# Patient Record
Sex: FEMALE | Race: WHITE | Employment: UNEMPLOYED | URBAN - METROPOLITAN AREA
[De-identification: names, ages, dates, MRNs, and addresses within clinical notes are randomized per-mention and may not be internally consistent; named-entity substitution may affect disease eponyms.]

---

## 2017-05-05 ENCOUNTER — ED HISTORICAL/CONVERTED ENCOUNTER (OUTPATIENT)
Dept: OTHER | Age: 47
End: 2017-05-05

## 2018-01-24 ENCOUNTER — TELEPHONE (OUTPATIENT)
Dept: FAMILY MEDICINE CLINIC | Age: 48
End: 2018-01-24

## 2018-01-24 NOTE — TELEPHONE ENCOUNTER
----- Message from Renea Morgan MD sent at 1/23/2018  4:32 PM EST -----  Please get records from referred office . If no records pt would need to be canceled.

## 2018-02-22 ENCOUNTER — OP HISTORICAL/CONVERTED ENCOUNTER (OUTPATIENT)
Dept: OTHER | Age: 48
End: 2018-02-22

## 2018-02-23 ENCOUNTER — OP HISTORICAL/CONVERTED ENCOUNTER (OUTPATIENT)
Dept: OTHER | Age: 48
End: 2018-02-23

## 2018-06-27 ENCOUNTER — TELEPHONE (OUTPATIENT)
Dept: FAMILY MEDICINE CLINIC | Age: 48
End: 2018-06-27

## 2018-06-27 NOTE — TELEPHONE ENCOUNTER
Pt rescheduled appointment with Dr. Sarah Mcdonough due to transportation issues but is requesting refill on Doxycycline. Pt unable to give dose and advised to have her pharmacy fax refill request to Dr. Sarah Mcdonough since we have not seen her at this office and have no record on file yet. Pt agrees to plan and is aware Dr. Sarah Mcdonough is out of office this week on vacation.   Jake

## 2018-07-03 NOTE — TELEPHONE ENCOUNTER
----- Message from Denver Begum sent at 7/3/2018 11:48 AM EDT -----  Regarding: /Refill  Pt would like a refill on \"doxycyclline\"    Pt uses [facility] pt advised contact information on file    Best contact [phone]

## 2018-08-01 ENCOUNTER — OFFICE VISIT (OUTPATIENT)
Dept: FAMILY MEDICINE CLINIC | Age: 48
End: 2018-08-01

## 2018-08-01 VITALS
RESPIRATION RATE: 12 BRPM | TEMPERATURE: 98 F | DIASTOLIC BLOOD PRESSURE: 64 MMHG | BODY MASS INDEX: 26.46 KG/M2 | HEART RATE: 76 BPM | HEIGHT: 62 IN | OXYGEN SATURATION: 97 % | SYSTOLIC BLOOD PRESSURE: 101 MMHG | WEIGHT: 143.8 LBS

## 2018-08-01 DIAGNOSIS — M46.40 DISCITIS, UNSPECIFIED SPINAL REGION: Primary | ICD-10-CM

## 2018-08-01 RX ORDER — BACLOFEN 10 MG/1
TABLET ORAL
Refills: 0 | COMMUNITY
Start: 2018-07-12

## 2018-08-01 RX ORDER — FERROUS GLUCONATE 324(37.5)
TABLET ORAL
Refills: 0 | COMMUNITY
Start: 2018-06-27

## 2018-08-01 RX ORDER — LORAZEPAM 0.5 MG/1
TABLET ORAL
Refills: 1 | COMMUNITY
Start: 2018-07-23

## 2018-08-01 RX ORDER — DOXYCYCLINE 100 MG/1
CAPSULE ORAL
Refills: 3 | COMMUNITY
Start: 2018-07-03 | End: 2018-10-15 | Stop reason: SDUPTHER

## 2018-08-01 RX ORDER — FLUCONAZOLE 150 MG/1
TABLET ORAL
Refills: 0 | COMMUNITY
Start: 2018-05-25

## 2018-08-01 RX ORDER — LEVOTHYROXINE SODIUM 50 UG/1
TABLET ORAL
Refills: 0 | COMMUNITY
Start: 2018-06-27

## 2018-08-01 RX ORDER — ALBUTEROL SULFATE 90 UG/1
AEROSOL, METERED RESPIRATORY (INHALATION)
Refills: 0 | COMMUNITY
Start: 2018-07-23

## 2018-08-01 RX ORDER — FLUTICASONE PROPIONATE 50 MCG
SPRAY, SUSPENSION (ML) NASAL
Refills: 0 | COMMUNITY
Start: 2018-05-08

## 2018-08-01 RX ORDER — RILUZOLE 50 MG/1
TABLET, FILM COATED ORAL
Refills: 0 | COMMUNITY
Start: 2018-07-10

## 2018-08-01 RX ORDER — OXYCODONE HYDROCHLORIDE 60 MG/1
TABLET, FILM COATED, EXTENDED RELEASE ORAL
Refills: 0 | COMMUNITY
Start: 2018-07-25

## 2018-08-01 RX ORDER — GLUCOSAMINE SULFATE 1500 MG
POWDER IN PACKET (EA) ORAL
Refills: 0 | COMMUNITY
Start: 2018-07-21

## 2018-08-01 RX ORDER — LAMOTRIGINE 25 MG/1
TABLET ORAL
Refills: 1 | COMMUNITY
Start: 2018-07-20

## 2018-08-01 RX ORDER — OXYCODONE HYDROCHLORIDE 30 MG/1
TABLET ORAL
Refills: 0 | COMMUNITY
Start: 2018-07-25

## 2018-08-01 RX ORDER — CITALOPRAM 40 MG/1
TABLET, FILM COATED ORAL
Refills: 1 | COMMUNITY
Start: 2018-07-13

## 2018-08-01 NOTE — MR AVS SNAPSHOT
[address] 
[phone] Patient: [facility] MRN: [id number] :1970 Visit Information Date & Time Provider Department Dept. Phone Encounter #  
 2018  2:45 PM Юлия Langston, [address] [id number] Upcoming Health Maintenance Date Due DTaP/Tdap/Td series (1 - Tdap) 10/3/1989 PAP AKA CERVICAL CYTOLOGY 10/3/1989 Influenza Age 5 to Adult 2018 Allergies as of 2018  Review Complete On: 2018 By: Shyann Paez Not on File Current Immunizations  Never Reviewed No immunizations on file. Not reviewed this visit Vitals BP Pulse Temp Resp Height(growth percentile) Weight(growth percentile) 101/64 (BP 1 Location: Left arm, BP Patient Position: Sitting) 76 98 °F (36.7 °C) (Oral) 12 5' 2\" (1.575 m) 143 lb 12.8 oz (65.2 kg) SpO2 BMI 97% 26.3 kg/m2 BMI and BSA Data Body Mass Index Body Surface Area  
 26.3 kg/m 2 1.69 m 2 Preferred Pharmacy Pharmacy Name Phone 140[facility], [address] [place] [address] [address] [place] [phone] Your Updated Medication List  
  
   
This list is accurate as of 18  3:22 PM.  Always use your most recent med list.  
  
  
  
  
 baclofen 10 mg tablet Commonly known as:  LIORESAL  
take 2 tablets BY MOUTH TWICE DAILY  
  
 cholecalciferol 1,000 unit Cap Commonly known as:  VITAMIN D3  
TAKE ONE CAPSULE BY MOUTH EVERY DAY  
  
 citalopram 40 mg tablet Commonly known as:  CELEXA  
TAKE ONE TABLET BY MOUTH EVERY MORNING  
  
 doxycycline 100 mg capsule Commonly known as:  VIBRAMYCIN  
TAKE ONE CAPSULE BY MOUTH TWICE DAILY - FOLLOWING UP FOR refills  
  
 ferrous gluconate 324 mg (37.5 mg iron) tablet TAKE ONE TABLET BY MOUTH THREE TIMES DAILY  
  
 fluconazole 150 mg tablet Commonly known as:  DIFLUCAN  
take 1 tablet by mouth as a single dose fluticasone 50 mcg/actuation nasal spray Commonly known as:  FLONASE  
use 2 sprays in each nostril EVERY TWELVE HOURS  
  
 lamoTRIgine 25 mg tablet Commonly known as: LaMICtal  
TAKE ONE TABLET BY MOUTH EVERY MORNING AND NOON AND TAKE 3 TABLETS AT 8pm  
  
 levothyroxine 50 mcg tablet Commonly known as:  SYNTHROID  
TAKE ONE TABLET BY MOUTH EVERY DAY  
  
 LORazepam 0.5 mg tablet Commonly known as:  ATIVAN  
take 1/2  to 1 tablet by mouth EVERY TWELVE HOURS AS NEEDED FOR ANXIETY (30 day supply) * OxyCONTIN 60 mg Tr12 Generic drug:  oxyCODONE  
TAKE ONE TABLET BY MOUTH every 12 hours * oxyCODONE IR 30 mg immediate release tablet Commonly known as:  ROXICODONE  
TAKE ONE TABLET BY MOUTH EVERY 4 TO 6 HOURS  
  
 riluzole tablet Commonly known as:  RILUTEK  
TAKE ONE TABLET BY MOUTH TWICE DAILY VENTOLIN HFA 90 mcg/actuation inhaler Generic drug:  albuterol INHALE TWO PUFFS BY MOUTH FOUR TIMES DAILY * Notice: This list has 2 medication(s) that are the same as other medications prescribed for you. Read the directions carefully, and ask your doctor or other care provider to review them with you. We Performed the Following C REACTIVE PROTEIN, QT [97029 CPT(R)] CBC WITH AUTOMATED DIFF [73895 CPT(R)] METABOLIC PANEL, COMPREHENSIVE [71187 CPT(R)] Introducing [facility] SERVICES! Dear Mamie Del Rio: 
Thank you for requesting a [employer] account. Our records indicate that you already have an active [employer] account. You can access your account anytime at [email or web address] Did you know that you can access your hospital and ER discharge instructions at any time in [employer]? You can also review all of your test results from your hospital stay or ER visit. Additional Information If you have questions, please visit the Frequently Asked Questions section of the [employer] website at [email or web address] Remember, [employer] is NOT to be used for urgent needs. For medical emergencies, dial 911. Now available from your iPhone and Android! Please provide this summary of care documentation to your next provider. If you have any questions after today's visit, please call [phone].

## 2018-08-01 NOTE — PROGRESS NOTES
1. Have you been to the ER, urgent care clinic since your last visit? Hospitalized since your last visit? No    2. Have you seen or consulted any other health care providers outside of the [facility] since your last visit? Include any pap smears or colon screening.  No     Chief Complaint   Patient presents with    Medication Evaluation

## 2018-08-02 NOTE — PROGRESS NOTES
[facility] Infectious Disease Specialists Progress Note           Griffin Kwon DO    [phone] Office  [phone]  Fax    8/1/2018      Assessment & Plan:   1. Postoperative spine infection. Will request records. Continue suppression with doxycycline. Follow labs every 8 weeks          Subjective:     Back pain persists    Objective:     Vitals:   Visit Vitals    /64 (BP 1 Location: Left arm, BP Patient Position: Sitting)    Pulse 76    Temp 98 °F (36.7 °C) (Oral)    Resp 12    Ht 5' 2\" (1.575 m)    Wt 65.2 kg (143 lb 12.8 oz)    SpO2 97%    BMI 26.3 kg/m2       Exam:       Physical Examination:   General:  Alert, cooperative, no distress   Head:  Normocephalic, atraumatic. Eyes:  Conjunctivae clear   Neck: Supple       Lungs:   No distress. Chest wall:     Heart:     Abdomen:      Extremities: Moves all. Skin:  No rash   Neurologic: CNII-XII intact. Labs:        No lab exists for component: ITNL   No results for input(s): CPK, CKMB, TROIQ in the last 72 hours. No results for input(s): NA, K, CL, CO2, BUN, CREA, GLU, PHOS, MG, ALB, WBC, HGB, HCT, PLT, HGBEXT, HCTEXT, PLTEXT in the last 72 hours. No lab exists for component:  CA  No results for input(s): INR, PTP, APTT in the last 72 hours.     No lab exists for component: INREXT  Needs: urine analysis, urine sodium, protein and creatinine  No results found for: BONNIE, CREAU      Cultures:     No results found for: SDES  No results found for: CULT    Radiology:     Medications       Current Outpatient Prescriptions   Medication Sig Dispense    riluzole (RILUTEK) tablet TAKE ONE TABLET BY MOUTH TWICE DAILY     OXYCONTIN 60 mg TR12 TAKE ONE TABLET BY MOUTH every 12 hours     oxyCODONE IR (ROXICODONE) 30 mg immediate release tablet TAKE ONE TABLET BY MOUTH EVERY 4 TO 6 HOURS     levothyroxine (SYNTHROID) 50 mcg tablet TAKE ONE TABLET BY MOUTH EVERY DAY     LORazepam (ATIVAN) 0.5 mg tablet take 1/2  to 1 tablet by mouth EVERY TWELVE HOURS AS NEEDED FOR ANXIETY (30 day supply)     lamoTRIgine (LAMICTAL) 25 mg tablet TAKE ONE TABLET BY MOUTH EVERY MORNING AND NOON AND TAKE 3 TABLETS AT 8pm     VENTOLIN HFA 90 mcg/actuation inhaler INHALE TWO PUFFS BY MOUTH FOUR TIMES DAILY     baclofen (LIORESAL) 10 mg tablet take 2 tablets BY MOUTH TWICE DAILY     cholecalciferol (VITAMIN D3) 1,000 unit cap TAKE ONE CAPSULE BY MOUTH EVERY DAY     citalopram (CELEXA) 40 mg tablet TAKE ONE TABLET BY MOUTH EVERY MORNING     doxycycline (VIBRAMYCIN) 100 mg capsule TAKE ONE CAPSULE BY MOUTH TWICE DAILY - FOLLOWING UP FOR refills     ferrous gluconate 324 mg (37.5 mg iron) tablet TAKE ONE TABLET BY MOUTH THREE TIMES DAILY     fluconazole (DIFLUCAN) 150 mg tablet take 1 tablet by mouth as a single dose     fluticasone (FLONASE) 50 mcg/actuation nasal spray use 2 sprays in each nostril EVERY TWELVE HOURS      No current facility-administered medications for this visit.             Case discussed with:      Brenda Hurtado DO

## 2018-10-01 LAB
ABSOLUTE BANDS, 67058: NORMAL
ABSOLUTE BLASTS: NORMAL
ABSOLUTE METAMYELOCYTES, 900360: NORMAL
ABSOLUTE MYELOCYTES: NORMAL
ABSOLUTE NRBC,ANRBC: NORMAL
ABSOLUTE PROMYELOCYTES: NORMAL
ALB/GLOBRATIO, 58C: 2.2 (CALC) (ref 1–2.5)
ALBUMIN SERPL-MCNC: 4.1 G/DL (ref 3.6–5.1)
ALP SERPL-CCNC: 125 U/L (ref 33–115)
ALT SERPL-CCNC: 16 U/L (ref 6–29)
AST SERPL W P-5'-P-CCNC: 20 U/L (ref 10–35)
BANDS,BANDS: NORMAL
BASOPHILS # BLD: 29 CELLS/UL (ref 0–200)
BASOPHILS NFR BLD: 0.4 %
BILIRUB SERPL-MCNC: 0.6 MG/DL (ref 0.2–1.2)
BLASTS,BLAST: NORMAL
BUN SERPL-MCNC: 16 MG/DL (ref 7–25)
BUN/CREATININE RATIO,BUCR: ABNORMAL (CALC) (ref 6–22)
CALCIUM SERPL-MCNC: 9.1 MG/DL (ref 8.6–10.2)
CHLORIDE SERPL-SCNC: 101 MMOL/L (ref 98–110)
CO2 SERPL-SCNC: 27 MMOL/L (ref 20–32)
COMMENT(S): NORMAL
CREAT SERPL-MCNC: 0.66 MG/DL (ref 0.5–1.1)
CRP, HIGH SENSITIVIT,SCRP: 20.3 MG/L
EOSINOPHIL # BLD: 58 CELLS/UL (ref 15–500)
EOSINOPHIL NFR BLD: 0.8 %
ERYTHROCYTE [DISTWIDTH] IN BLOOD BY AUTOMATED COUNT: 12.8 % (ref 11–15)
GLOBULIN,GLOB: 1.9 G/DL (CALC) (ref 1.9–3.7)
GLUCOSE SERPL-MCNC: 81 MG/DL (ref 65–99)
HCT VFR BLD AUTO: 42 % (ref 35–45)
HGB BLD-MCNC: 14.6 G/DL (ref 11.7–15.5)
LYMPHOCYTES # BLD: 1803 CELLS/UL (ref 850–3900)
LYMPHOCYTES NFR BLD: 24.7 %
MCH RBC QN AUTO: 31.3 PG (ref 27–33)
MCHC RBC AUTO-ENTMCNC: 34.8 G/DL (ref 32–36)
MCV RBC AUTO: 89.9 FL (ref 80–100)
METAMYELOCYTES,METAS: NORMAL
MONOCYTES # BLD: 745 CELLS/UL (ref 200–950)
MONOCYTES NFR BLD: 10.2 %
MYELOCYTES,MYELO: NORMAL
NEUTROPHILS # BLD AUTO: 4665 CELLS/UL (ref 1500–7800)
NEUTROPHILS # BLD: 63.9 %
NRBC: NORMAL
PLATELET # BLD AUTO: 212 THOUSAND/UL (ref 140–400)
PMV BLD AUTO: 10.7 FL (ref 7.5–12.5)
POTASSIUM SERPL-SCNC: 3.9 MMOL/L (ref 3.5–5.3)
PROMYELOCYTES,PRO: NORMAL
PROT SERPL-MCNC: 6 G/DL (ref 6.1–8.1)
RBC # BLD AUTO: 4.67 MILLION/UL (ref 3.8–5.1)
REACTIVE LYMPHS: NORMAL
SODIUM SERPL-SCNC: 137 MMOL/L (ref 135–146)
WBC # BLD AUTO: 7.3 THOUSAND/UL (ref 3.8–10.8)

## 2018-10-10 ENCOUNTER — OFFICE VISIT (OUTPATIENT)
Dept: FAMILY MEDICINE CLINIC | Age: 48
End: 2018-10-10

## 2018-10-10 VITALS
HEART RATE: 65 BPM | TEMPERATURE: 97.2 F | WEIGHT: 147 LBS | SYSTOLIC BLOOD PRESSURE: 134 MMHG | RESPIRATION RATE: 18 BRPM | HEIGHT: 62 IN | BODY MASS INDEX: 27.05 KG/M2 | DIASTOLIC BLOOD PRESSURE: 78 MMHG

## 2018-10-10 DIAGNOSIS — M46.40 DISCITIS, UNSPECIFIED SPINAL REGION: Primary | ICD-10-CM

## 2018-10-10 NOTE — PROGRESS NOTES
Chief Complaint   Patient presents with    Medication Evaluation     follow up on oral antibotics    Fall     10/06/2018 in bathroom.  Blackend let eye and now having blurred vision and slurred speech

## 2018-10-10 NOTE — PROGRESS NOTES
[facility] Infectious Disease Specialists Progress Note           Alfonso Camilo DO    [phone] Office  [phone]  Fax    10/10/2018      Assessment & Plan:   1. Postoperative spine infection. Will request records. Continue suppression with doxycycline. Follow labs every 8 weeks  2. Right forehead hematoma. Patient to be seen in ER today    9/28/19. CRP 20.3          Subjective:     Right forehead hematoma 2nd to fall. C/o ongoing back pain    Objective:     Vitals:   Visit Vitals    /78    Pulse 65    Temp 97.2 °F (36.2 °C) (Oral)    Resp 18    Ht 5' 2\" (1.575 m)    Wt 66.7 kg (147 lb)    BMI 26.89 kg/m2        Tmax:  @FSEB(47)@    Exam:     Physical Examination:   General:  Alert, cooperative, no distress   Head:  Left forehead hematoma   Eyes:  Conjunctivae clear   Neck: Supple       Lungs:   No distress. Chest wall:     Heart:     Abdomen:      Extremities: Moves all   Skin:  No rash   Neurologic: CNII-XII intact. Labs:        No lab exists for component: ITNL   No results for input(s): CPK, CKMB, TROIQ in the last 72 hours. No results for input(s): NA, K, CL, CO2, BUN, CREA, GLU, PHOS, MG, ALB, WBC, HGB, HCT, PLT, HGBEXT, HCTEXT, PLTEXT in the last 72 hours. No lab exists for component:  CA  No results for input(s): INR, PTP, APTT in the last 72 hours.     No lab exists for component: INREXT  Needs: urine analysis, urine sodium, protein and creatinine  No results found for: BONNIE, CREAU      Cultures:     No results found for: SDES  No results found for: CULT    Radiology:     Medications       Current Outpatient Prescriptions   Medication Sig Dispense    riluzole (RILUTEK) tablet TAKE ONE TABLET BY MOUTH TWICE DAILY     OXYCONTIN 60 mg TR12 TAKE ONE TABLET BY MOUTH every 12 hours     oxyCODONE IR (ROXICODONE) 30 mg immediate release tablet TAKE ONE TABLET BY MOUTH EVERY 4 TO 6 HOURS     levothyroxine (SYNTHROID) 50 mcg tablet TAKE ONE TABLET BY MOUTH EVERY DAY     LORazepam (ATIVAN) 0.5 mg tablet take 1/2  to 1 tablet by mouth EVERY TWELVE HOURS AS NEEDED FOR ANXIETY (30 day supply)     lamoTRIgine (LAMICTAL) 25 mg tablet TAKE ONE TABLET BY MOUTH EVERY MORNING AND NOON AND TAKE 3 TABLETS AT 8pm     VENTOLIN HFA 90 mcg/actuation inhaler INHALE TWO PUFFS BY MOUTH FOUR TIMES DAILY     baclofen (LIORESAL) 10 mg tablet take 2 tablets BY MOUTH TWICE DAILY     cholecalciferol (VITAMIN D3) 1,000 unit cap TAKE ONE CAPSULE BY MOUTH EVERY DAY     citalopram (CELEXA) 40 mg tablet TAKE ONE TABLET BY MOUTH EVERY MORNING     doxycycline (VIBRAMYCIN) 100 mg capsule TAKE ONE CAPSULE BY MOUTH TWICE DAILY - FOLLOWING UP FOR refills     ferrous gluconate 324 mg (37.5 mg iron) tablet TAKE ONE TABLET BY MOUTH THREE TIMES DAILY     fluconazole (DIFLUCAN) 150 mg tablet take 1 tablet by mouth as a single dose     fluticasone (FLONASE) 50 mcg/actuation nasal spray use 2 sprays in each nostril EVERY TWELVE HOURS      No current facility-administered medications for this visit.             Case discussed with:      Nehal Weaver DO

## 2018-10-15 NOTE — TELEPHONE ENCOUNTER
Pt called stating she's been trying to get Doxycycline refilled since last week with no response. Please send refill to [facility].  Jake

## 2018-10-16 RX ORDER — DOXYCYCLINE 100 MG/1
CAPSULE ORAL
Qty: 60 CAP | Refills: 3 | Status: SHIPPED | OUTPATIENT
Start: 2018-10-16 | End: 2019-01-21 | Stop reason: SDUPTHER

## 2019-01-21 RX ORDER — DOXYCYCLINE 100 MG/1
CAPSULE ORAL
Qty: 60 CAP | Refills: 3 | Status: SHIPPED | OUTPATIENT
Start: 2019-01-21 | End: 2019-04-29 | Stop reason: SDUPTHER

## 2019-04-29 RX ORDER — DOXYCYCLINE 100 MG/1
CAPSULE ORAL
Qty: 60 CAP | Refills: 3 | Status: SHIPPED | OUTPATIENT
Start: 2019-04-29 | End: 2019-08-05 | Stop reason: SDUPTHER

## 2019-08-05 RX ORDER — DOXYCYCLINE 100 MG/1
CAPSULE ORAL
Qty: 60 CAP | Refills: 3 | Status: SHIPPED | OUTPATIENT
Start: 2019-08-05 | End: 2019-11-11 | Stop reason: SDUPTHER

## 2019-11-12 RX ORDER — DOXYCYCLINE 100 MG/1
CAPSULE ORAL
Qty: 60 CAP | Refills: 3 | Status: SHIPPED | OUTPATIENT
Start: 2019-11-12

## 2022-08-31 ENCOUNTER — TRANSCRIBE ORDER (OUTPATIENT)
Dept: SCHEDULING | Age: 52
End: 2022-08-31

## 2023-01-18 ENCOUNTER — OFFICE VISIT (OUTPATIENT)
Dept: NEUROLOGY | Age: 53
End: 2023-01-18

## 2023-01-18 VITALS
SYSTOLIC BLOOD PRESSURE: 130 MMHG | HEIGHT: 60 IN | DIASTOLIC BLOOD PRESSURE: 70 MMHG | BODY MASS INDEX: 28.71 KG/M2 | OXYGEN SATURATION: 97 % | HEART RATE: 94 BPM

## 2023-01-18 RX ORDER — DULOXETIN HYDROCHLORIDE 60 MG/1
CAPSULE, DELAYED RELEASE ORAL
COMMUNITY
Start: 2022-12-29

## 2023-01-18 RX ORDER — ALPRAZOLAM 0.5 MG/1
TABLET ORAL
COMMUNITY
Start: 2023-01-11

## 2023-06-05 ENCOUNTER — TRANSCRIBE ORDERS (OUTPATIENT)
Facility: HOSPITAL | Age: 53
End: 2023-06-05

## 2023-06-05 DIAGNOSIS — R60.0 LEG EDEMA, LEFT: Primary | ICD-10-CM

## 2023-06-06 ENCOUNTER — HOSPITAL ENCOUNTER (OUTPATIENT)
Facility: HOSPITAL | Age: 53
Discharge: HOME | End: 2023-06-08
Payer: MEDICARE

## 2023-06-06 DIAGNOSIS — R60.0 LEG EDEMA, LEFT: ICD-10-CM

## 2023-06-06 PROCEDURE — 93971 EXTREMITY STUDY: CPT

## 2023-11-20 ENCOUNTER — HOSPITAL ENCOUNTER (OUTPATIENT)
Facility: HOSPITAL | Age: 53
Discharge: HOME | End: 2023-11-20
Attending: SPECIALIST
Payer: MEDICARE

## 2023-11-20 VITALS
DIASTOLIC BLOOD PRESSURE: 84 MMHG | RESPIRATION RATE: 18 BRPM | TEMPERATURE: 98.8 F | SYSTOLIC BLOOD PRESSURE: 136 MMHG | BODY MASS INDEX: 28.52 KG/M2 | WEIGHT: 155 LBS | HEART RATE: 85 BPM | HEIGHT: 62 IN

## 2023-11-20 DIAGNOSIS — S31.000A OPEN WOUND OF LOWER BACK: Primary | ICD-10-CM

## 2023-11-20 PROCEDURE — 87186 SC STD MICRODIL/AGAR DIL: CPT

## 2023-11-20 PROCEDURE — 87077 CULTURE AEROBIC IDENTIFY: CPT

## 2023-11-20 PROCEDURE — 87070 CULTURE OTHR SPECIMN AEROBIC: CPT

## 2023-11-20 PROCEDURE — 99204 OFFICE O/P NEW MOD 45 MIN: CPT

## 2023-11-20 PROCEDURE — 87205 SMEAR GRAM STAIN: CPT

## 2023-11-20 RX ORDER — GINSENG 100 MG
CAPSULE ORAL ONCE
OUTPATIENT
Start: 2023-11-20 | End: 2023-11-20

## 2023-11-20 RX ORDER — FAMOTIDINE 20 MG/1
60 TABLET, FILM COATED ORAL 2 TIMES DAILY
COMMUNITY

## 2023-11-20 RX ORDER — BACLOFEN 10 MG/1
50 TABLET ORAL 2 TIMES DAILY
COMMUNITY

## 2023-11-20 RX ORDER — GINSENG 100 MG
CAPSULE ORAL ONCE
Status: CANCELLED | OUTPATIENT
Start: 2023-11-20 | End: 2023-11-20

## 2023-11-20 RX ORDER — BETAMETHASONE DIPROPIONATE 0.5 MG/G
CREAM TOPICAL ONCE
Status: CANCELLED | OUTPATIENT
Start: 2023-11-20 | End: 2023-11-20

## 2023-11-20 RX ORDER — LIDOCAINE HYDROCHLORIDE 20 MG/ML
JELLY TOPICAL ONCE
Status: CANCELLED | OUTPATIENT
Start: 2023-11-20 | End: 2023-11-20

## 2023-11-20 RX ORDER — LIDOCAINE HYDROCHLORIDE 20 MG/ML
JELLY TOPICAL ONCE
OUTPATIENT
Start: 2023-11-20 | End: 2023-11-20

## 2023-11-20 RX ORDER — LIDOCAINE 40 MG/G
CREAM TOPICAL ONCE
OUTPATIENT
Start: 2023-11-20 | End: 2023-11-20

## 2023-11-20 RX ORDER — OXCARBAZEPINE 150 MG/1
100 TABLET, FILM COATED ORAL 3 TIMES DAILY
COMMUNITY

## 2023-11-20 RX ORDER — BACITRACIN ZINC AND POLYMYXIN B SULFATE 500; 1000 [USP'U]/G; [USP'U]/G
OINTMENT TOPICAL ONCE
Status: CANCELLED | OUTPATIENT
Start: 2023-11-20 | End: 2023-11-20

## 2023-11-20 RX ORDER — GENTAMICIN SULFATE 1 MG/G
OINTMENT TOPICAL ONCE
OUTPATIENT
Start: 2023-11-20 | End: 2023-11-20

## 2023-11-20 RX ORDER — LIDOCAINE 50 MG/G
OINTMENT TOPICAL ONCE
Status: CANCELLED | OUTPATIENT
Start: 2023-11-20 | End: 2023-11-20

## 2023-11-20 RX ORDER — LIDOCAINE HYDROCHLORIDE 40 MG/ML
SOLUTION TOPICAL ONCE
Status: CANCELLED | OUTPATIENT
Start: 2023-11-20 | End: 2023-11-20

## 2023-11-20 RX ORDER — LIDOCAINE 40 MG/G
CREAM TOPICAL ONCE
Status: CANCELLED | OUTPATIENT
Start: 2023-11-20 | End: 2023-11-20

## 2023-11-20 RX ORDER — CLOBETASOL PROPIONATE 0.5 MG/G
OINTMENT TOPICAL ONCE
OUTPATIENT
Start: 2023-11-20 | End: 2023-11-20

## 2023-11-20 RX ORDER — BACITRACIN ZINC AND POLYMYXIN B SULFATE 500; 1000 [USP'U]/G; [USP'U]/G
OINTMENT TOPICAL ONCE
OUTPATIENT
Start: 2023-11-20 | End: 2023-11-20

## 2023-11-20 RX ORDER — SODIUM CHLOR/HYPOCHLOROUS ACID 0.033 %
SOLUTION, IRRIGATION IRRIGATION ONCE
OUTPATIENT
Start: 2023-11-20 | End: 2023-11-20

## 2023-11-20 RX ORDER — IBUPROFEN 200 MG
TABLET ORAL ONCE
OUTPATIENT
Start: 2023-11-20 | End: 2023-11-20

## 2023-11-20 RX ORDER — LIDOCAINE HYDROCHLORIDE 40 MG/ML
SOLUTION TOPICAL ONCE
OUTPATIENT
Start: 2023-11-20 | End: 2023-11-20

## 2023-11-20 RX ORDER — SODIUM CHLOR/HYPOCHLOROUS ACID 0.033 %
SOLUTION, IRRIGATION IRRIGATION ONCE
Status: CANCELLED | OUTPATIENT
Start: 2023-11-20 | End: 2023-11-20

## 2023-11-20 RX ORDER — TRIAMCINOLONE ACETONIDE 1 MG/G
OINTMENT TOPICAL ONCE
OUTPATIENT
Start: 2023-11-20 | End: 2023-11-20

## 2023-11-20 RX ORDER — GENTAMICIN SULFATE 1 MG/G
OINTMENT TOPICAL ONCE
Status: CANCELLED | OUTPATIENT
Start: 2023-11-20 | End: 2023-11-20

## 2023-11-20 RX ORDER — LIDOCAINE 50 MG/G
OINTMENT TOPICAL ONCE
OUTPATIENT
Start: 2023-11-20 | End: 2023-11-20

## 2023-11-20 RX ORDER — TRIAMCINOLONE ACETONIDE 1 MG/G
OINTMENT TOPICAL ONCE
Status: CANCELLED | OUTPATIENT
Start: 2023-11-20 | End: 2023-11-20

## 2023-11-20 RX ORDER — IBUPROFEN 200 MG
TABLET ORAL ONCE
Status: CANCELLED | OUTPATIENT
Start: 2023-11-20 | End: 2023-11-20

## 2023-11-20 RX ORDER — CLOBETASOL PROPIONATE 0.5 MG/G
OINTMENT TOPICAL ONCE
Status: CANCELLED | OUTPATIENT
Start: 2023-11-20 | End: 2023-11-20

## 2023-11-20 RX ORDER — BETAMETHASONE DIPROPIONATE 0.5 MG/G
CREAM TOPICAL ONCE
OUTPATIENT
Start: 2023-11-20 | End: 2023-11-20

## 2023-11-20 RX ORDER — LEVOTHYROXINE SODIUM 0.1 MG/1
100 TABLET ORAL DAILY
COMMUNITY

## 2023-11-20 ASSESSMENT — PAIN SCALES - GENERAL: PAINLEVEL_OUTOF10: 7

## 2023-11-20 NOTE — DISCHARGE INSTRUCTIONS
Wound Clinic Physician Orders and Discharge Instructions  [address] [place] [address] S. 709 [facility], [address] [place] [address] Way  Telephone: [phone]    NAME:  Shalini Osorio  YOB: 1970  MEDICAL RECORD NUMBER:  [id number]  DATE:  11/20/2023      Return Appointment:  [] Dressing Supply Provider:   [x] Home Healthcare: [address] [place] [address]  [x] Return Appointment: 1 Week(s)  [] Nurse Visit:     [] Discharge from [facility]: [] Healed        [] Refer to Provider:         [] Consult    Follow-up Information:  [] Ordered Tests:   [x] Referrals: F/U with surgeon  [] Rx:   [x] Other: culture obtained in clinic      Wound Cleansing:   Do not scrub or use excessive force. Cleanse wound prior to applying a clean dressing with:  [x] Normal Saline   [] Keep Wound Dry in Shower     [] Wound Cleanser   [] Cleanse wound with Mild Soap & Water    [] Other:       Topical Treatments:  Do not apply lotions, creams, or ointments to wound bed unless directed. [] Apply moisturizing lotion to skin surrounding the wound prior to dressing change.  [] Apply antifungal ointment to skin surrounding the wound prior to dressing change.  [] Apply thin film of moisture barrier ointment to skin immediately around wound.   [] Apply Betadine to skin immediately around wound   [] Other:      Dressings:           Wound Location Back    [x] Apply Primary Dressing:       [] MediHoney Gel [] MediHoney Alginate  [] Calcium Alginate with Silver   [] Calcium Alginate without silver   [] Collagen with silver [] Collagen without Silver    [] Santyl with moist saline gauze     [x] Hydrofera Blue packing: Has tunnels at 12:00 and 6:00 (cut to size and moistened with normal saline)  [] Hydrofera Blue Ready (cut to size)      [] Normal Saline wet to dry  [] Betadine wet to dry    [] Hydrogel  [] Mepitel     [] Bactroban/Mupirocin   [] Iodoform Packing Strip [] Plain Packing Strip   [] Skin Sub:   [] Other:

## 2023-11-22 RX ORDER — CIPROFLOXACIN 500 MG/1
500 TABLET, FILM COATED ORAL 2 TIMES DAILY
Qty: 20 TABLET | Refills: 0 | OUTPATIENT
Start: 2023-11-22 | End: 2023-12-02

## 2023-11-22 NOTE — WOUND CARE
[address] Elma Nichols [address] f: [phone] f: [phone] p: [phone] [email or web address]      Ordering Center:     10 [facility]  4 [facility] [address] [address] [id number] [address] [place] [address] Dept: [address] [place] [address] [phone]    Patient Information:      Jim Streeter  [address] [place] [address]   [phone]   : 1970  AGE: 52 y.o. GENDER: female   EPISODE DATE: 2023    Insurance:      PRIMARY INSURANCE:  Plan: [employer] DUAL ADVANTAGE  Coverage: Private Outlet MEDICARE  Effective Date: 2022  Group Number: [unfilled]  Subscriber Number: [id number] - (Medicare Managed)    Payer/Plan Subscr  Sex Relation Sub. Ins. ID Effective Group Num   1. [facility] 1970 [facility] Self [id number]                                   [address]   2. [address] 1970 Female Self [id number] 3/1/20                                    [address] [address]       Patient Wound Information:      Problem List Items Addressed This Visit          Other    Open wound of lower back - Primary    Relevant Orders    Initiate Outpatient Wound Care Protocol       WOUNDS REQUIRING DRESSING SUPPLIES:     Wound 23 Back #1 (Active)   Wound Image   23 0837   Wound Etiology Non-Healing Surgical 23 0837   Dressing Status Other (Comment) 23 0837   Wound Cleansed Cleansed with saline 23 0837   Wound Length (cm) 0.7 cm 23 0837   Wound Width (cm) 0.3 cm 23 0837   Wound Depth (cm) 2.4 cm 23 0837   Wound Surface Area (cm^2) 0.21 cm^2 23 0837   Wound Volume (cm^3) 0.504 cm^3 23 0837   Distance Tunneling (cm) 2.4 cm 23 0837   Tunneling Position ___ O'Clock 6 23 0837   Wound Assessment Slough;Granulation tissue; Exposed structure bone 23 2846   Drainage Amount Moderate (25-50%)
Patient called and left a message stating she had increased drainage in her back wound. Writer spoke with Dr. Zuleyka Singh who stated patient needed to follow up with the surgeon as he is unsure if the drainage is CSF or wound drainage given the location and depth of the wound. Writer called patient back, phone went straight to voicemail, message left for patient.
Wound Clinic Physician Orders and Discharge Instructions  [address] [place] [address] S. 709 [facility], [address] [place] [address]  Telephone: [phone]    NAME:  Felice Cunningham  YOB: 1970  MEDICAL RECORD NUMBER:  [id number]  DATE:  11/20/2023      Return Appointment:  [] Dressing Supply Provider:   [x] Home Healthcare: [address] [place] [address]  [x] Return Appointment: 1 Week(s)  [] Nurse Visit:     [] Discharge from [facility]: [] Healed        [] Refer to Provider:         [] Consult    Follow-up Information:  [] Ordered Tests:   [x] Referrals: F/U with surgeon  [] Rx:   [x] Other: culture obtained in clinic      Wound Cleansing:   Do not scrub or use excessive force. Cleanse wound prior to applying a clean dressing with:  [x] Normal Saline   [] Keep Wound Dry in Shower     [] Wound Cleanser   [] Cleanse wound with Mild Soap & Water    [] Other:       Topical Treatments:  Do not apply lotions, creams, or ointments to wound bed unless directed. [] Apply moisturizing lotion to skin surrounding the wound prior to dressing change.  [] Apply antifungal ointment to skin surrounding the wound prior to dressing change.  [] Apply thin film of moisture barrier ointment to skin immediately around wound.   [] Apply Betadine to skin immediately around wound   [] Other:      Dressings:           Wound Location Back    [x] Apply Primary Dressing:       [] MediHoney Gel [] MediHoney Alginate  [] Calcium Alginate with Silver   [] Calcium Alginate without silver   [] Collagen with silver [] Collagen without Silver    [] Santyl with moist saline gauze     [x] Hydrofera Blue packing: Has tunnels at 12:00 and 6:00 (cut to size and moistened with normal saline)  [] Hydrofera Blue Ready (cut to size)      [] Normal Saline wet to dry  [] Betadine wet to dry    [] Hydrogel  [] Mepitel     [] Bactroban/Mupirocin   [] Iodoform Packing Strip [] Plain Packing Strip   [] Skin Sub:   []
without new focal deficits. Mental status normal or at baseline  Wound: In the midline lower back, there is a well-healed surgical incision which at its upper edge extends has a small open area with tunneling and undermining. Culture taken of this deeper area of the wound. PAST MEDICAL HISTORY        Diagnosis Date    Thyroid disease        PAST SURGICAL HISTORY    Past Surgical History:   Procedure Laterality Date    BACK SURGERY      CHOLECYSTECTOMY      COLON SURGERY      JOINT REPLACEMENT      REVISION TOTAL KNEE ARTHROPLASTY Right        FAMILY HISTORY    Family History   Problem Relation Age of Onset    Heart Attack Maternal Grandmother     Stroke Maternal Grandmother     Heart Attack Maternal Grandfather     Stroke Maternal Grandfather     Heart Attack Paternal Grandmother     Stroke Paternal Grandmother     Heart Attack Paternal Grandfather     Stroke Paternal Grandfather        SOCIAL HISTORY    Social History     Tobacco Use    Smoking status: Every Day     Packs/day: 0.50     Years: 40.00     Additional pack years: 0.00     Total pack years: 20.00     Types: Cigarettes    Smokeless tobacco: Never   Vaping Use    Vaping Use: Never used   Substance Use Topics    Alcohol use: Not Currently    Drug use: Never       ALLERGIES    Allergies   Allergen Reactions    Oatmeal Hives    Vancomycin Hives       MEDICATIONS    Current Outpatient Medications on File Prior to Encounter   Medication Sig Dispense Refill    OXcarbazepine (TRILEPTAL) 150 MG tablet Take 100 mg by mouth 3 times daily      baclofen (LIORESAL) 10 MG tablet Take 5 tablets by mouth 2 times daily      famotidine (PEPCID) 20 MG tablet Take 3 tablets by mouth 2 times daily      levothyroxine (SYNTHROID) 100 MCG tablet Take 1 tablet by mouth Daily       No current facility-administered medications on file prior to encounter.        REVIEW OF SYSTEMS  A comprehensive review of systems was negative except for what has been indicated above and: No

## 2023-11-23 LAB
BACTERIA SPEC CULT: ABNORMAL
BACTERIA SPEC CULT: ABNORMAL
GRAM STN SPEC: ABNORMAL
GRAM STN SPEC: ABNORMAL
Lab: ABNORMAL

## 2023-11-27 ENCOUNTER — HOSPITAL ENCOUNTER (OUTPATIENT)
Facility: HOSPITAL | Age: 53
Discharge: HOME | End: 2023-11-27
Attending: SPECIALIST
Payer: MEDICARE

## 2023-11-27 VITALS
TEMPERATURE: 98.2 F | SYSTOLIC BLOOD PRESSURE: 163 MMHG | RESPIRATION RATE: 18 BRPM | DIASTOLIC BLOOD PRESSURE: 81 MMHG | HEART RATE: 90 BPM

## 2023-11-27 DIAGNOSIS — S31.000A OPEN WOUND OF LOWER BACK: Primary | ICD-10-CM

## 2023-11-27 PROCEDURE — 11042 DBRDMT SUBQ TIS 1ST 20SQCM/<: CPT

## 2023-11-27 RX ORDER — LIDOCAINE 40 MG/G
CREAM TOPICAL ONCE
OUTPATIENT
Start: 2023-11-27 | End: 2023-11-27

## 2023-11-27 RX ORDER — LIDOCAINE HYDROCHLORIDE 20 MG/ML
JELLY TOPICAL ONCE
OUTPATIENT
Start: 2023-11-27 | End: 2023-11-27

## 2023-11-27 RX ORDER — BETAMETHASONE DIPROPIONATE 0.5 MG/G
CREAM TOPICAL ONCE
OUTPATIENT
Start: 2023-11-27 | End: 2023-11-27

## 2023-11-27 RX ORDER — SODIUM CHLOR/HYPOCHLOROUS ACID 0.033 %
SOLUTION, IRRIGATION IRRIGATION ONCE
OUTPATIENT
Start: 2023-11-27 | End: 2023-11-27

## 2023-11-27 RX ORDER — TRIAMCINOLONE ACETONIDE 1 MG/G
OINTMENT TOPICAL ONCE
OUTPATIENT
Start: 2023-11-27 | End: 2023-11-27

## 2023-11-27 RX ORDER — IBUPROFEN 200 MG
TABLET ORAL ONCE
OUTPATIENT
Start: 2023-11-27 | End: 2023-11-27

## 2023-11-27 RX ORDER — LIDOCAINE 50 MG/G
OINTMENT TOPICAL ONCE
OUTPATIENT
Start: 2023-11-27 | End: 2023-11-27

## 2023-11-27 RX ORDER — CLOBETASOL PROPIONATE 0.5 MG/G
OINTMENT TOPICAL ONCE
OUTPATIENT
Start: 2023-11-27 | End: 2023-11-27

## 2023-11-27 RX ORDER — LIDOCAINE HYDROCHLORIDE 40 MG/ML
SOLUTION TOPICAL ONCE
OUTPATIENT
Start: 2023-11-27 | End: 2023-11-27

## 2023-11-27 RX ORDER — GENTAMICIN SULFATE 1 MG/G
OINTMENT TOPICAL ONCE
OUTPATIENT
Start: 2023-11-27 | End: 2023-11-27

## 2023-11-27 RX ORDER — BACITRACIN ZINC AND POLYMYXIN B SULFATE 500; 1000 [USP'U]/G; [USP'U]/G
OINTMENT TOPICAL ONCE
OUTPATIENT
Start: 2023-11-27 | End: 2023-11-27

## 2023-11-27 RX ORDER — GINSENG 100 MG
CAPSULE ORAL ONCE
OUTPATIENT
Start: 2023-11-27 | End: 2023-11-27

## 2023-11-27 RX ORDER — CIPROFLOXACIN 500 MG/1
500 TABLET, FILM COATED ORAL 2 TIMES DAILY
Qty: 20 TABLET | Refills: 0 | Status: SHIPPED | OUTPATIENT
Start: 2023-11-27 | End: 2023-12-07

## 2023-11-27 NOTE — DISCHARGE INSTRUCTIONS
Wound Clinic Physician Orders and Discharge Instructions  [address] [place] [address] S. 709 [facility], [address] [place] [address], [address] [place] [address]  Telephone: [phone]    NAME:  Radha Olivo  YOB: 1970  MEDICAL RECORD NUMBER:  [id number]  DATE:  11/27/2023      Return Appointment:  [] Dressing Supply Provider:   [x] Home Healthcare: [address]  [x] Return Appointment: 1 Week(s)  [] Nurse Visit:     [] Discharge from [facility]: [] Healed        [] Refer to Provider:         [] Consult    Follow-up Information:  [] Ordered Tests:   [x] Referrals: F/U with surgeon  [x] Rx: Cipro to pharmacy  [] Other:       Wound Cleansing:   Do not scrub or use excessive force. Cleanse wound prior to applying a clean dressing with:  [x] Normal Saline   [] Keep Wound Dry in Shower     [] Wound Cleanser   [] Cleanse wound with Mild Soap & Water    [] Other:       Topical Treatments:  Do not apply lotions, creams, or ointments to wound bed unless directed. [] Apply moisturizing lotion to skin surrounding the wound prior to dressing change.  [] Apply antifungal ointment to skin surrounding the wound prior to dressing change.  [] Apply thin film of moisture barrier ointment to skin immediately around wound.   [] Apply Betadine to skin immediately around wound   [] Other:      Dressings:           Wound Location Back    [x] Apply Primary Dressing:       [] MediHoney Gel [] MediHoney Alginate  [] Calcium Alginate with Silver   [] Calcium Alginate without silver   [] Collagen with silver [] Collagen without Silver    [] Santyl with moist saline gauze     [x] Hydrofera Blue packing: Has tunnels at 6:00 (cut to size and moistened with normal saline)  [] Hydrofera Blue Ready (cut to size)      [] Normal Saline wet to dry  [] Betadine wet to dry    [] Hydrogel  [] Mepitel     [] Bactroban/Mupirocin   [] Iodoform Packing Strip [] Plain Packing Strip   [] Skin Sub:   [] Other:      [x] Cover and Secure

## 2023-11-27 NOTE — WOUND CARE
Wound Clinic Physician Orders and Discharge Instructions  [address] [place] [address] S. 709 [facility], [address] [place] [address] Way  Telephone: [phone]    NAME:  Radha Olivo  YOB: 1970  MEDICAL RECORD NUMBER:  [id number]  DATE:  11/27/2023      Return Appointment:  [] Dressing Supply Provider:   [x] Home Healthcare: [facility]  [x] Return Appointment: 1 Week(s)  [] Nurse Visit:     [] Discharge from [facility]: [] Healed        [] Refer to Provider:         [] Consult    Follow-up Information:  [] Ordered Tests:   [x] Referrals: F/U with surgeon  [x] Rx: Cipro to pharmacy  [] Other:       Wound Cleansing:   Do not scrub or use excessive force. Cleanse wound prior to applying a clean dressing with:  [x] Normal Saline   [] Keep Wound Dry in Shower     [] Wound Cleanser   [] Cleanse wound with Mild Soap & Water    [] Other:       Topical Treatments:  Do not apply lotions, creams, or ointments to wound bed unless directed. [] Apply moisturizing lotion to skin surrounding the wound prior to dressing change.  [] Apply antifungal ointment to skin surrounding the wound prior to dressing change.  [] Apply thin film of moisture barrier ointment to skin immediately around wound.   [] Apply Betadine to skin immediately around wound   [] Other:      Dressings:           Wound Location Back    [x] Apply Primary Dressing:       [] MediHoney Gel [] MediHoney Alginate  [] Calcium Alginate with Silver   [] Calcium Alginate without silver   [] Collagen with silver [] Collagen without Silver    [] Santyl with moist saline gauze     [x] Hydrofera Blue packing: Has tunnels at 6:00 (cut to size and moistened with normal saline)  [] Hydrofera Blue Ready (cut to size)      [] Normal Saline wet to dry  [] Betadine wet to dry    [] Hydrogel  [] Mepitel     [] Bactroban/Mupirocin   [] Iodoform Packing Strip [] Plain Packing Strip   [] Skin Sub:   [] Other:      [x] Cover and Secure
[id number]   Odor None 11/27/23 0928   Elaine-wound Assessment Intact 11/27/23 0928   Margins Unattached edges 11/27/23 0928   Wound Thickness Description not for Pressure Injury Full thickness 11/27/23 0928   Number of days: 7        Total Surface Area Debrided:  0.4 sq cm   Estimated Blood Loss: None. Hemostasis Achieved:  not needed  Procedural Pain: 0  / 10   Post Procedural Pain: 0 / 10   Response to treatment: Patient tolerated procedure well with no complaints of pain.

## 2023-12-04 ENCOUNTER — HOSPITAL ENCOUNTER (OUTPATIENT)
Facility: HOSPITAL | Age: 53
Discharge: HOME | End: 2023-12-04
Attending: SPECIALIST
Payer: MEDICARE

## 2023-12-04 VITALS
RESPIRATION RATE: 18 BRPM | SYSTOLIC BLOOD PRESSURE: 149 MMHG | HEART RATE: 71 BPM | DIASTOLIC BLOOD PRESSURE: 72 MMHG | TEMPERATURE: 97.5 F

## 2023-12-04 DIAGNOSIS — S31.000A OPEN WOUND OF LOWER BACK: Primary | ICD-10-CM

## 2023-12-04 PROCEDURE — 99213 OFFICE O/P EST LOW 20 MIN: CPT

## 2023-12-04 RX ORDER — BACITRACIN ZINC AND POLYMYXIN B SULFATE 500; 1000 [USP'U]/G; [USP'U]/G
OINTMENT TOPICAL ONCE
OUTPATIENT
Start: 2023-12-04 | End: 2023-12-04

## 2023-12-04 RX ORDER — LIDOCAINE 50 MG/G
OINTMENT TOPICAL ONCE
OUTPATIENT
Start: 2023-12-04 | End: 2023-12-04

## 2023-12-04 RX ORDER — LIDOCAINE HYDROCHLORIDE 20 MG/ML
JELLY TOPICAL ONCE
OUTPATIENT
Start: 2023-12-04 | End: 2023-12-04

## 2023-12-04 RX ORDER — LIDOCAINE 40 MG/G
CREAM TOPICAL ONCE
OUTPATIENT
Start: 2023-12-04 | End: 2023-12-04

## 2023-12-04 RX ORDER — SODIUM CHLOR/HYPOCHLOROUS ACID 0.033 %
SOLUTION, IRRIGATION IRRIGATION ONCE
OUTPATIENT
Start: 2023-12-04 | End: 2023-12-04

## 2023-12-04 RX ORDER — BETAMETHASONE DIPROPIONATE 0.5 MG/G
CREAM TOPICAL ONCE
OUTPATIENT
Start: 2023-12-04 | End: 2023-12-04

## 2023-12-04 RX ORDER — TRIAMCINOLONE ACETONIDE 1 MG/G
OINTMENT TOPICAL ONCE
OUTPATIENT
Start: 2023-12-04 | End: 2023-12-04

## 2023-12-04 RX ORDER — GINSENG 100 MG
CAPSULE ORAL ONCE
OUTPATIENT
Start: 2023-12-04 | End: 2023-12-04

## 2023-12-04 RX ORDER — CLOBETASOL PROPIONATE 0.5 MG/G
OINTMENT TOPICAL ONCE
OUTPATIENT
Start: 2023-12-04 | End: 2023-12-04

## 2023-12-04 RX ORDER — LIDOCAINE HYDROCHLORIDE 40 MG/ML
SOLUTION TOPICAL ONCE
OUTPATIENT
Start: 2023-12-04 | End: 2023-12-04

## 2023-12-04 RX ORDER — GENTAMICIN SULFATE 1 MG/G
OINTMENT TOPICAL ONCE
OUTPATIENT
Start: 2023-12-04 | End: 2023-12-04

## 2023-12-04 RX ORDER — IBUPROFEN 200 MG
TABLET ORAL ONCE
OUTPATIENT
Start: 2023-12-04 | End: 2023-12-04

## 2023-12-04 ASSESSMENT — PAIN SCALES - GENERAL: PAINLEVEL_OUTOF10: 7

## 2023-12-04 ASSESSMENT — PAIN DESCRIPTION - LOCATION: LOCATION: BACK

## 2023-12-04 NOTE — WOUND CARE
Wound Clinic Physician Orders and Discharge Instructions  [address] [place] [address] S. 709 [facility], [address] [place] [address]  Telephone: [phone]    NAME:  Diana Lozano  YOB: 1970  MEDICAL RECORD NUMBER:  [id number]  DATE:  12/4/2023      Return Appointment:  [] Dressing Supply Provider:   [x] [facility] Healthcare: [facility]  [x] Return Appointment: 1 Week(s)  [] Nurse Visit:     [] Discharge from [facility]: [] Healed        [] Refer to Provider:         [] Consult    Follow-up Information:  [] Ordered Tests:   [] Referrals:   [] Rx:   [x] Other: continue IV antibiotics      Wound Cleansing:   Do not scrub or use excessive force. Cleanse wound prior to applying a clean dressing with:  [x] Normal Saline   [] Keep Wound Dry in Shower     [] Wound Cleanser   [] Cleanse wound with Mild Soap & Water    [] Other:       Topical Treatments:  Do not apply lotions, creams, or ointments to wound bed unless directed. [] Apply moisturizing lotion to skin surrounding the wound prior to dressing change.  [] Apply antifungal ointment to skin surrounding the wound prior to dressing change.  [] Apply thin film of moisture barrier ointment to skin immediately around wound.   [] Apply Betadine to skin immediately around wound   [] Other:      Dressings:           Wound Location Back    [x] Apply Primary Dressing:       [] MediHoney Gel [] MediHoney Alginate  [] Calcium Alginate with Silver   [] Calcium Alginate without silver   [] Collagen with silver [] Collagen without Silver    [] Santyl with moist saline gauze     [x] Hydrofera Blue packing: Has tunnels at 12 & 6:00 (cut to size and moistened with normal saline)  [] Hydrofera Blue Ready (cut to size)      [] Normal Saline wet to dry  [] Betadine wet to dry    [] Hydrogel  [] Mepitel     [] Bactroban/Mupirocin   [] Iodoform Packing Strip [] Plain Packing Strip   [] Skin Sub:   [] Other:      [x] Cover and Secure with:

## 2023-12-04 NOTE — DISCHARGE INSTRUCTIONS
Wound Clinic Physician Orders and Discharge Instructions  [address] [place] [address] S. 709 [facility], [address] [place] [address] Way  Telephone: [phone]    NAME:  Cb Clarke  YOB: 1970  MEDICAL RECORD NUMBER:  [id number]  DATE:  12/4/2023      Return Appointment:  [] Dressing Supply Provider:   [x] Home Healthcare: Critical access hospital  [x] Return Appointment: 1 Week(s)  [] Nurse Visit:     [] Discharge from [facility]: [] Healed        [] Refer to Provider:         [] Consult    Follow-up Information:  [] Ordered Tests:   [] Referrals:   [] Rx:   [x] Other: continue IV antibiotics      Wound Cleansing:   Do not scrub or use excessive force. Cleanse wound prior to applying a clean dressing with:  [x] Normal Saline   [] Keep Wound Dry in Shower     [] Wound Cleanser   [] Cleanse wound with Mild Soap & Water    [] Other:       Topical Treatments:  Do not apply lotions, creams, or ointments to wound bed unless directed. [] Apply moisturizing lotion to skin surrounding the wound prior to dressing change.  [] Apply antifungal ointment to skin surrounding the wound prior to dressing change.  [] Apply thin film of moisture barrier ointment to skin immediately around wound.   [] Apply Betadine to skin immediately around wound   [] Other:      Dressings:           Wound Location Back    [x] Apply Primary Dressing:       [] MediHoney Gel [] MediHoney Alginate  [] Calcium Alginate with Silver   [] Calcium Alginate without silver   [] Collagen with silver [] Collagen without Silver    [] Santyl with moist saline gauze     [x] Hydrofera Blue packing: Has tunnels at 12 & 6:00 (cut to size and moistened with normal saline)  [] Hydrofera Blue Ready (cut to size)      [] Normal Saline wet to dry  [] Betadine wet to dry    [] Hydrogel  [] Mepitel     [] Bactroban/Mupirocin   [] Iodoform Packing Strip [] Plain Packing Strip   [] Skin Sub:   [] Other:      [x] Cover and Secure with:     [x]

## 2023-12-04 NOTE — WOUND CARE
[address] [facility]   Medical Staff Progress Note     [address] RECORD NUMBER:  [id number]  AGE: 52 y.o. GENDER: female  : 1970  EPISODE DATE:  2023    Chief complaint and reason for visit:   Surgical wound on the back  Chief Complaint   Patient presents with    Wound Check     back      Patient presenting for follow up evaluation of wound(s) per chief complaint. Subjective: Symptoms, wound related issues, or other pertinent wound history since last visit: Patient was seen by her spine surgeon last week, and she reports that she is scheduled for reoperation on her back on 2024. The surgeon also started the patient on IV antibiotics via her indwelling PICC line. She denies fever. She is having some pain on the left lateral lower leg. Wound 23 Back #1 (Active)   Wound Image   23 1037   Wound Etiology Non-Healing Surgical 23 1037   Dressing Status Old drainage noted 23 1037   Wound Cleansed Cleansed with saline 23 1037   Dressing/Treatment Other (comment) 23 1011   Wound Length (cm) 1 cm 23 1037   Wound Width (cm) 0.5 cm 23 1037   Wound Depth (cm) 3 cm 23 1037   Wound Surface Area (cm^2) 0.5 cm^2 23 1037   Change in Wound Size % (l*w) -138.1 23 1037   Wound Volume (cm^3) 1.5 cm^3 23 1037   Wound Healing % -198 23 1037   Post-Procedure Length (cm) 1 cm 23 0939   Post-Procedure Width (cm) 0.4 cm 23 0939   Post-Procedure Depth (cm) 2.2 cm 23 0939   Post-Procedure Surface Area (cm^2) 0.4 cm^2 23 0939   Post-Procedure Volume (cm^3) 0.88 cm^3 23 0939   Distance Tunneling (cm) 2.5 cm 23 1037   Tunneling Position ___ O'Clock 6 23 1037   Wound Assessment Slough;Granulation tissue; Exposed structure bone 23 1037   Drainage Amount Moderate (25-50%) 23 1037   Drainage Description

## 2023-12-11 ENCOUNTER — HOSPITAL ENCOUNTER (OUTPATIENT)
Facility: HOSPITAL | Age: 53
Discharge: HOME | End: 2023-12-11
Attending: SPECIALIST
Payer: MEDICARE

## 2023-12-11 VITALS
DIASTOLIC BLOOD PRESSURE: 75 MMHG | TEMPERATURE: 97.8 F | RESPIRATION RATE: 18 BRPM | SYSTOLIC BLOOD PRESSURE: 155 MMHG | HEART RATE: 85 BPM

## 2023-12-11 DIAGNOSIS — S31.000A OPEN WOUND OF LOWER BACK: Primary | ICD-10-CM

## 2023-12-11 PROCEDURE — 99213 OFFICE O/P EST LOW 20 MIN: CPT

## 2023-12-11 PROCEDURE — 11042 DBRDMT SUBQ TIS 1ST 20SQCM/<: CPT

## 2023-12-11 RX ORDER — LIDOCAINE HYDROCHLORIDE 20 MG/ML
JELLY TOPICAL ONCE
OUTPATIENT
Start: 2023-12-11 | End: 2023-12-11

## 2023-12-11 RX ORDER — LIDOCAINE 50 MG/G
OINTMENT TOPICAL ONCE
OUTPATIENT
Start: 2023-12-11 | End: 2023-12-11

## 2023-12-11 RX ORDER — LIDOCAINE HYDROCHLORIDE 40 MG/ML
SOLUTION TOPICAL ONCE
OUTPATIENT
Start: 2023-12-11 | End: 2023-12-11

## 2023-12-11 RX ORDER — LIDOCAINE 40 MG/G
CREAM TOPICAL ONCE
OUTPATIENT
Start: 2023-12-11 | End: 2023-12-11

## 2023-12-11 RX ORDER — GINSENG 100 MG
CAPSULE ORAL ONCE
OUTPATIENT
Start: 2023-12-11 | End: 2023-12-11

## 2023-12-11 RX ORDER — SODIUM CHLOR/HYPOCHLOROUS ACID 0.033 %
SOLUTION, IRRIGATION IRRIGATION ONCE
OUTPATIENT
Start: 2023-12-11 | End: 2023-12-11

## 2023-12-11 RX ORDER — IBUPROFEN 200 MG
TABLET ORAL ONCE
OUTPATIENT
Start: 2023-12-11 | End: 2023-12-11

## 2023-12-11 RX ORDER — BETAMETHASONE DIPROPIONATE 0.5 MG/G
CREAM TOPICAL ONCE
OUTPATIENT
Start: 2023-12-11 | End: 2023-12-11

## 2023-12-11 RX ORDER — CLOBETASOL PROPIONATE 0.5 MG/G
OINTMENT TOPICAL ONCE
OUTPATIENT
Start: 2023-12-11 | End: 2023-12-11

## 2023-12-11 RX ORDER — BACITRACIN ZINC AND POLYMYXIN B SULFATE 500; 1000 [USP'U]/G; [USP'U]/G
OINTMENT TOPICAL ONCE
OUTPATIENT
Start: 2023-12-11 | End: 2023-12-11

## 2023-12-11 RX ORDER — GENTAMICIN SULFATE 1 MG/G
OINTMENT TOPICAL ONCE
OUTPATIENT
Start: 2023-12-11 | End: 2023-12-11

## 2023-12-11 RX ORDER — TRIAMCINOLONE ACETONIDE 1 MG/G
OINTMENT TOPICAL ONCE
OUTPATIENT
Start: 2023-12-11 | End: 2023-12-11

## 2023-12-11 ASSESSMENT — PAIN SCALES - GENERAL: PAINLEVEL_OUTOF10: 5

## 2023-12-11 NOTE — WOUND CARE
Wound Clinic Physician Orders and Discharge Instructions  [address] [place] [address] S. 709 [facility], [address] [place] [address] Way  Telephone: [phone]    NAME:  Mirella John  YOB: 1970  MEDICAL RECORD NUMBER:  [id number]  DATE:  12/11/2023      Return Appointment:  [] Dressing Supply Provider:   [x] Home Healthcare: [facility]  [x] Return Appointment: 1 Week(s)  [] Nurse Visit:     [] Discharge from [facility]: [] Healed        [] Refer to Provider:         [] Consult    Follow-up Information:  [] Ordered Tests:   [] Referrals:   [] Rx:   [x] Other: continue IV antibiotics      Wound Cleansing:   Do not scrub or use excessive force. Cleanse wound prior to applying a clean dressing with:  [x] Normal Saline   [] Keep Wound Dry in Shower     [] Wound Cleanser   [] Cleanse wound with Mild Soap & Water    [] Other:       Topical Treatments:  Do not apply lotions, creams, or ointments to wound bed unless directed. [] Apply moisturizing lotion to skin surrounding the wound prior to dressing change.  [] Apply antifungal ointment to skin surrounding the wound prior to dressing change.  [] Apply thin film of moisture barrier ointment to skin immediately around wound.   [] Apply Betadine to skin immediately around wound   [] Other:      Dressings:           Wound Location Back    [x] Apply Primary Dressing:       [] MediHoney Gel [] MediHoney Alginate  [] Calcium Alginate with Silver   [] Calcium Alginate without silver   [] Collagen with silver [] Collagen without Silver    [] Santyl with moist saline gauze     [x] Hydrofera Blue packing: Has tunnels at 12 & 6:00 (cut to size and moistened with normal saline)  [] Hydrofera Blue Ready (cut to size)      [] Normal Saline wet to dry  [] Betadine wet to dry    [] Hydrogel  [] Mepitel     [] Bactroban/Mupirocin   [] Iodoform Packing Strip [] Plain Packing Strip   [] Skin Sub:   [] Other:      [x] Cover and Secure with:
Blue packing: Has tunnels at 12 & 6:00 (cut to size and moistened with normal saline)  [] Hydrofera Blue Ready (cut to size)      [] Normal Saline wet to dry  [] Betadine wet to dry    [] Hydrogel  [] Mepitel     [] Bactroban/Mupirocin   [] Iodoform Packing Strip [] Plain Packing Strip   [] Skin Sub:   [] Other:      [x] Cover and Secure with:     [x] Gauze [] Loyda [] Kerlix   [x] Zetuvit Plus Silicone Border [] Super Absorbant [] ABD     [] Ace Wrap [] Other:    Limit contact of tape with skin. [x] Change dressing: [] Daily    [] Every Other Day   [] Twice per week   [x] Three times per week   [] Once a week [] Do Not Change Dressing   [] Other:      Dietary:  [x] Diet as tolerated: [] Calorie Diabetic Diet: [] No Added Salt:  [x] Increase Protein: [] Other:     Activity:  [x] Activity as tolerated:    [] Patient has no activity restrictions      [] Strict Bedrest   [] Remain off Work      [] May return to full duty work                                     [] Return to work with restrictions     Physician:  [x] Dr. Sharmin West  [] Dr. Hay Herrera  [] Dr. David Salcedo      Nurse Case Manger:  [address] [facility] [address] Information: Should you experience any significant changes in your wound(s) or have questions about your wound care, please contact the [employer] at [phone]. Our hours are Monday - Friday 8am - 4:30pm, closed all major holidays. If you need help with your wound outside these hours and cannot wait until we are again available, contact your PCP or go to the hospital emergency room. PLEASE NOTE: IF YOU ARE UNABLE TO OBTAIN WOUND SUPPLIES, CONTINUE TO USE THE SUPPLIES YOU HAVE AVAILABLE UNTIL YOU ARE ABLE TO REACH US. IT IS MOST IMPORTANT TO KEEP THE WOUND COVERED AT ALL TIMES.             Electronically signed by Sherley Omalley MD on 12/11/2023 at 12:05 PM

## 2023-12-11 NOTE — DISCHARGE INSTRUCTIONS
Gauze [] Loyda [] Kerlix   [x] Zetuvit Plus Silicone Border [] Super Absorbant [] ABD     [] Ace Wrap [] Other:    Limit contact of tape with skin. [x] Change dressing: [] Daily    [] Every Other Day   [] Twice per week   [x] Three times per week   [] Once a week [] Do Not Change Dressing   [] Other:      Dietary:  [x] Diet as tolerated: [] Calorie Diabetic Diet: [] No Added Salt:  [x] Increase Protein: [] Other:     Activity:  [x] Activity as tolerated:    [] Patient has no activity restrictions      [] Strict Bedrest   [] Remain off Work      [] May return to full duty work                                     [] Return to work with restrictions     Physician:  [x] Dr. Purnima Rosa  [] Dr. Tc Arana  [] Dr. Kassandra Aguilar      Nurse Case Manger[facility]  [facility] Information: Should you experience any significant changes in your wound(s) or have questions about your wound care, please contact the [employer] at [phone]. Our hours are Monday - Friday 8am - 4:30pm, closed all major holidays. If you need help with your wound outside these hours and cannot wait until we are again available, contact your PCP or go to the hospital emergency room. PLEASE NOTE: IF YOU ARE UNABLE TO OBTAIN WOUND SUPPLIES, CONTINUE TO USE THE SUPPLIES YOU HAVE AVAILABLE UNTIL YOU ARE ABLE TO REACH US. IT IS MOST IMPORTANT TO KEEP THE WOUND COVERED AT ALL TIMES.

## 2024-01-23 ENCOUNTER — HOSPITAL ENCOUNTER (OUTPATIENT)
Facility: HOSPITAL | Age: 54
Discharge: HOME | End: 2024-01-23
Attending: SPECIALIST
Payer: MEDICAID

## 2024-01-23 VITALS
HEIGHT: 61 IN | DIASTOLIC BLOOD PRESSURE: 82 MMHG | BODY MASS INDEX: 29.27 KG/M2 | TEMPERATURE: 99.1 F | SYSTOLIC BLOOD PRESSURE: 158 MMHG | WEIGHT: 155 LBS | RESPIRATION RATE: 18 BRPM | HEART RATE: 99 BPM

## 2024-01-23 DIAGNOSIS — S31.000A OPEN WOUND OF LOWER BACK: Primary | ICD-10-CM

## 2024-01-23 DIAGNOSIS — L98.423 NON-PRESSURE CHRONIC ULCER OF BACK WITH NECROSIS OF MUSCLE (HCC): ICD-10-CM

## 2024-01-23 PROCEDURE — 99214 OFFICE O/P EST MOD 30 MIN: CPT

## 2024-01-23 RX ORDER — OXYCODONE HYDROCHLORIDE 5 MG/1
5 TABLET ORAL EVERY 4 HOURS PRN
COMMUNITY

## 2024-01-23 NOTE — WOUND CARE
Wound Clinic Physician Orders and Discharge Instructions  [facility]  [address], [address]  [place] [address]  Telephone: [phone]     FAX [phone]    NAME:  Paula Renteria  YOB: 1970  MEDICAL RECORD NUMBER:  [id number]  DATE:  1/23/2024      Return Appointment:  [x] Dressing Supply Provider: THONY  [x] Home Healthcare: [facility]ITIATE -  [facility]  [x] Return Appointment: 1 Week(s)  [] Nurse Visit:     [x] Discharge from [facility]: [] Healed        [] Consult    Follow-up Information:  [] Ordered Tests:   [x] Referrals: Baldo Pulido MD  - ORTHOPEDIC- PATIENT TO KEEP APPOINTMENT  [] Rx:   [] Other:       Wound Cleansing:   Do not scrub or use excessive force.  Cleanse wound prior to applying a clean dressing with:  [x] Normal Saline OR   [] Keep Wound Dry in Shower     [] Wound Cleanser   [x] Cleanse wound with Mild Soap & Water    [] Other:       Topical Treatments:  Do not apply lotions, creams, or ointments to wound bed unless directed.   [] Apply moisturizing lotion to skin surrounding the wound prior to dressing change.  [] Apply antifungal ointment to skin surrounding the wound prior to dressing change.  [] Apply thin film of moisture barrier ointment to skin immediately around wound.  [] Apply Betadine to skin immediately around wound   [] Other:      Dressings:           Wound Location Back    [x] Apply Primary Dressing:       [] MediHoney Gel [] MediHoney Alginate  [] Calcium Alginate with Silver   [] Calcium Alginate without silver   [] Collagen with silver [] Collagen without Silver    [] Santyl with moist saline gauze     [x] Hydrofera Blue packing: UNTIL WOUND VAC ARRIVES OR IF WOUND VAC MALFUNCTIONS  (cut to size and moistened with normal saline)  [] Hydrofera Blue Ready (cut to size)      [] Normal Saline wet to dry  [] Betadine wet to dry    [] Hydrogel  [] Mepitel     [] Bactroban/Mupirocin   [] Iodoform Packing Strip [] Plain Packing

## 2024-01-23 NOTE — WOUND CARE
Wound Care Supplies      Supply Company:     [employer] [address] [place] [address] f: [phone] f: [phone] p: [phone] [email or web address]      Ordering Center:     [facility]  [address] [place] [address], [address] [address]    Phone: [phone]  Fax: [phone]    Patient Information:      Patricia Renteria  [address] [facility] [address]   [phone]   : 1970  AGE: 53 y.o.     GENDER: female   EPISODE DATE: 2024    Insurance:      PRIMARY INSURANCE:  Plan: [employer] [facility] HEALTHKEEPERS PLUS  Coverage: [facility] MEDICAID  Effective Date: 3/1/2020  Group Number: [unfilled]  Subscriber Number: [id number] - (Medicaid Managed)    Payer/Plan Subscr  Sex Relation Sub. Ins. ID Effective Group Num   1. [facility] MEDIC* PATRICIA RENTERIA 1970 Female Self [id number] 3/1/20                                    [address] [address]       Patient Wound Information:      Problem List Items Addressed This Visit    None      WOUNDS REQUIRING DRESSING SUPPLIES:     Wound 24 Back Lower #1 (Active)   Wound Image   24 1019   Wound Etiology Surgical 24 1019   Dressing Status Clean;Dry;Intact 24 1019   Wound Cleansed Cleansed with saline 24 1019   Wound Length (cm) 4 cm 24 1019   Wound Width (cm) 0.8 cm 24 1019   Wound Depth (cm) 3.7 cm 24 1019   Wound Surface Area (cm^2) 3.2 cm^2 24 1019   Wound Volume (cm^3) 11.84 cm^3 24 1019   Undermining Starts ___ O'Clock 12 24 1019   Undermining Ends___ O'Clock 12 24 1019   Undermining Maxium Distance (cm) 4 24 1019   Wound Assessment Granulation tissue;Slough;Exposed structure muscle 24 1019   Drainage Amount Moderate (25-50%) 24 1019   Drainage Description Serosanguinous 24 1019   Odor Moderate 24 1019   Elaine-wound Assessment Intact 24 1019   Margins Unattached edges;Undefined edges 24 1019

## 2024-01-23 NOTE — WOUND CARE
Order for V.A.C.®  Negative Pressure Wound Therapy  Please fax this form to [facility] at [phone]  [facility] Customer Service: [phone]      Ordering Center:   [facility]  [address] [place] [id number]  [phone]  WOUND CARE Dept: [phone]   FAX NUMBER [phone]  Patient Information:   Patricia Renteria  [address] [facility] [address]   [phone]   : 1970  AGE: 53 y.o.     GENDER: female   TODAYS DATE:  2024  Insurance:   PRIMARY INSURANCE:  Plan: [employer] [facility] HEALTHKEEPERS PLUS  Coverage: [facility] MEDICAID  Effective Date: 3/1/2020  [id number] - (Medicaid Managed)    Payer/Plan Subscr  Sex Relation Sub. Ins. ID Effective Group Num   1. [facility] MEDIC* PATRICIA RENTERIA 1970 Female Self [id number] 3/1/20                                    [address] [address]       Patient Wound Information:     Duration of the V.A.C.®  Negative Pressure Wound Therapy: 4 Month which includes up to 15 dressings per wound and up to 10 canisters per month    Goal at the completion of V.A.C.®  Negative Pressure Therapy: Assist in granulation tissue formation     Will HomeCare provide V.A.C.®  Therapy?  Yes Homecare will provide VAC Therapy. The date in which Homecare will initiate VAC Therapy is 2024     Equipment for Delivery:     Delivery Location V.A.C.® Therapy Pump: Patient's Home Address located under Patient Information on the top of this form    Up to 15 dressings per wound, per month  VAC Canisters: Up to 10 canisters per month  V.A.C.® Dressing: GRANUFOAM VAC Dressing Bridge XG Dressing, WHITEFOAM VAC Dressing Small, and SIMPLACE VAC Dressing Small    Clinical Information by Wound Type:     Was NPWT initiated in an inpatient facility? No or  Has the patient been on NPWT anytime during the last 60 days? Yes [place] [facility]     Is the patient’s nutritional status compromised? No    Please thomas all dressings that

## 2024-01-23 NOTE — DISCHARGE INSTRUCTIONS
Yaklein Gordon RN  Registered Nurse     Wound Care     Signed     Date of Service: 1/23/2024 10:00 AM     Signed                          Wound Clinic Physician Orders and Discharge Instructions  [facility]  [facility] [address]  [place] [id number]  Telephone: [phone]     FAX [phone]     NAME:  Paula Renteria  YOB: 1970  MEDICAL RECORD NUMBER:  [id number]  DATE:  1/23/2024        Return Appointment:  [x] Dressing Supply Provider: THONY  [x] Home Healthcare: [facility] -  [facility]  [x] Return Appointment: 1 Week(s)  [] Nurse Visit:      [x] Discharge from [facility]: [] Healed        [] Consult     Follow-up Information:  [] Ordered Tests:   [x] Referrals: Baldo Pulido MD  - ORTHOPEDIC- PATIENT TO KEEP APPOINTMENT  [] Rx:   [] Other:         Wound Cleansing:   Do not scrub or use excessive force.  Cleanse wound prior to applying a clean dressing with:  [x] Normal Saline OR    [] Keep Wound Dry in Shower     [] Wound Cleanser   [x] Cleanse wound with Mild Soap & Water    [] Other:        Topical Treatments:  Do not apply lotions, creams, or ointments to wound bed unless directed.   [] Apply moisturizing lotion to skin surrounding the wound prior to dressing change.  [] Apply antifungal ointment to skin surrounding the wound prior to dressing change.  [] Apply thin film of moisture barrier ointment to skin immediately around wound.  [] Apply Betadine to skin immediately around wound   [] Other:                 Dressings:                  Wound Location Back    [x] Apply Primary Dressing:                                          [] MediHoney Gel      [] MediHoney Alginate  [] Calcium Alginate with Silver   [] Calcium Alginate without silver              [] Collagen with silver            [] Collagen without Silver    [] Santyl with moist saline gauze                [x] Hydrofera Blue packing: UNTIL WOUND VAC ARRIVES OR IF WOUND VAC MALFUNCTIONS  (cut to

## 2024-01-24 RX ORDER — SODIUM CHLOR/HYPOCHLOROUS ACID 0.033 %
SOLUTION, IRRIGATION IRRIGATION ONCE
OUTPATIENT
Start: 2024-01-24 | End: 2024-01-24

## 2024-01-24 RX ORDER — GENTAMICIN SULFATE 1 MG/G
OINTMENT TOPICAL ONCE
OUTPATIENT
Start: 2024-01-24 | End: 2024-01-24

## 2024-01-24 RX ORDER — CLOBETASOL PROPIONATE 0.5 MG/G
OINTMENT TOPICAL ONCE
OUTPATIENT
Start: 2024-01-24 | End: 2024-01-24

## 2024-01-24 RX ORDER — BETAMETHASONE DIPROPIONATE 0.5 MG/G
CREAM TOPICAL ONCE
OUTPATIENT
Start: 2024-01-24 | End: 2024-01-24

## 2024-01-24 RX ORDER — TRIAMCINOLONE ACETONIDE 1 MG/G
OINTMENT TOPICAL ONCE
OUTPATIENT
Start: 2024-01-24 | End: 2024-01-24

## 2024-01-24 RX ORDER — IBUPROFEN 200 MG
TABLET ORAL ONCE
OUTPATIENT
Start: 2024-01-24 | End: 2024-01-24

## 2024-01-24 RX ORDER — GINSENG 100 MG
CAPSULE ORAL ONCE
OUTPATIENT
Start: 2024-01-24 | End: 2024-01-24

## 2024-01-24 RX ORDER — BACITRACIN ZINC AND POLYMYXIN B SULFATE 500; 1000 [USP'U]/G; [USP'U]/G
OINTMENT TOPICAL ONCE
OUTPATIENT
Start: 2024-01-24 | End: 2024-01-24

## 2024-01-24 NOTE — CONSULTS
Nontender. Surrounding skin with mild discoloration and erythema.   Treatment recommendation - Debridement  of slough to improve wound care.  Discussed with patient the purpose to enhance wound healing and the potential risks of infections, bleeding and pain.  She agrees to procedure. With Curette, slough was removed uneventfully. Minimal  bleeding which was controlled with direct pressure.    Recommend patient have negative wound vac placed.  This will be arranged with order.  Meanwhile will continue with hydrofera blue packing, cover with gauze, Zetuvit plus silicone border; change 3 times a week. Return next week.  Advised patient to stop smoking explaining the delay wound healing affect    Other diagnoses or problems addressed:      Pertinent labs reviewed.   Review of medical records and external note (s) from other providers was done for this visit.    New lab or imaging orders placed:  none    Prescription drug management: N/A     Risk of complications and/or mortality of patient management:  This patient has a minimal risk of morbidity and mortality from additional diagnostic testing or treatment. This is due to the above conditions affecting wound healing as well as patient and procedure risk factors. Education and discussion held with patient regarding these disease processes pertinent to wound(s).  Other pertinent decisions include: minor surgery or procedures as below.  The patient's diagnosis or treatment is  significantly limited by social determinants of health as noted by: wound care supply limitations .    Discussion of management or test interpretation with  patient who comes alone .    Time spent with patient and patient care issues above the usual time needed for wound assessment and treatment was: [] 15-20 min  [x] 21-30 min  [] 31-44 min  [] 45 min or more  This included time retrieving and reviewing records with patient and education provided to patient regarding disease process(es),

## 2024-01-25 NOTE — WOUND CARE
Wound Care Supplies      Supply Company:     [employer] [address] [place] [address] f: [phone] f: [phone] p: [phone] [email or web address]      Ordering Center:     [facility]  [address] [place] [address], [address] [address]    Phone: [phone]  Fax: [phone]    Patient Information:      Patricia Renteria  [address] [facility] [place] [address]   [phone]   : 1970  AGE: 53 y.o.     GENDER: female   EPISODE DATE: 2024    Insurance:      PRIMARY INSURANCE:  Plan: [employer] [facility] HEALTHKEEPERS PLUS  Coverage: [facility] MEDICAID  Effective Date: 3/1/2020  Group Number: [unfilled]  Subscriber Number: [id number] - (Medicaid Managed)    Payer/Plan Subscr  Sex Relation Sub. Ins. ID Effective Group Num   1. 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)       WOUNDS REQUIRING DRESSING SUPPLIES:     Wound 24 Back Lower #1 (Active)   Wound Image   24 1019   Wound Etiology Surgical 24 1019   Dressing Status Clean;Dry;Intact 24 1019   Wound Cleansed Cleansed with saline 24 1019   Wound Length (cm) 4 cm 24 1019   Wound Width (cm) 0.8 cm 24 1019   Wound Depth (cm) 3.7 cm 24 1019   Wound Surface Area (cm^2) 3.2 cm^2 24 1019   Wound Volume (cm^3) 11.84 cm^3 24 1019   Undermining Starts ___ O'Clock 12 24 1019   Undermining Ends___ O'Clock 12 24 1019   Undermining Maxium Distance (cm) 4 24 1019   Wound Assessment Granulation tissue;Slough;Exposed structure muscle 24 1019   Drainage Amount Moderate (25-50%) 24 1019   Drainage Description Serosanguinous 24 1019   Odor

## 2024-01-30 ENCOUNTER — HOSPITAL ENCOUNTER (OUTPATIENT)
Facility: HOSPITAL | Age: 54
Discharge: HOME | End: 2024-01-30
Attending: SPECIALIST
Payer: MEDICAID

## 2024-01-30 VITALS
TEMPERATURE: 97.9 F | RESPIRATION RATE: 18 BRPM | DIASTOLIC BLOOD PRESSURE: 79 MMHG | HEART RATE: 98 BPM | SYSTOLIC BLOOD PRESSURE: 139 MMHG

## 2024-01-30 DIAGNOSIS — S31.000A OPEN WOUND OF LOWER BACK: ICD-10-CM

## 2024-01-30 DIAGNOSIS — L98.423 NON-PRESSURE CHRONIC ULCER OF BACK WITH NECROSIS OF MUSCLE (HCC): Primary | ICD-10-CM

## 2024-01-30 PROCEDURE — 11042 DBRDMT SUBQ TIS 1ST 20SQCM/<: CPT

## 2024-01-30 RX ORDER — SODIUM CHLOR/HYPOCHLOROUS ACID 0.033 %
SOLUTION, IRRIGATION IRRIGATION ONCE
OUTPATIENT
Start: 2024-01-30 | End: 2024-01-30

## 2024-01-30 RX ORDER — TRIAMCINOLONE ACETONIDE 1 MG/G
OINTMENT TOPICAL ONCE
OUTPATIENT
Start: 2024-01-30 | End: 2024-01-30

## 2024-01-30 RX ORDER — IBUPROFEN 200 MG
TABLET ORAL ONCE
OUTPATIENT
Start: 2024-01-30 | End: 2024-01-30

## 2024-01-30 RX ORDER — GINSENG 100 MG
CAPSULE ORAL ONCE
OUTPATIENT
Start: 2024-01-30 | End: 2024-01-30

## 2024-01-30 RX ORDER — BETAMETHASONE DIPROPIONATE 0.5 MG/G
CREAM TOPICAL ONCE
OUTPATIENT
Start: 2024-01-30 | End: 2024-01-30

## 2024-01-30 RX ORDER — GENTAMICIN SULFATE 1 MG/G
OINTMENT TOPICAL ONCE
OUTPATIENT
Start: 2024-01-30 | End: 2024-01-30

## 2024-01-30 RX ORDER — CLOBETASOL PROPIONATE 0.5 MG/G
OINTMENT TOPICAL ONCE
OUTPATIENT
Start: 2024-01-30 | End: 2024-01-30

## 2024-01-30 RX ORDER — BACITRACIN ZINC AND POLYMYXIN B SULFATE 500; 1000 [USP'U]/G; [USP'U]/G
OINTMENT TOPICAL ONCE
OUTPATIENT
Start: 2024-01-30 | End: 2024-01-30

## 2024-01-30 NOTE — DISCHARGE INSTRUCTIONS
Yakelin Gordon RN  Registered Nurse     Wound Care     Signed     Date of Service: 1/30/2024 10:15 AM     Signed                          Wound Clinic Physician Orders and Discharge Instructions  [facility]  [facility] [address], [address] [place], [place] [address]  Telephone: [phone]     FAX [phone]     NAME:  Paula Renteria  YOB: 1970  MEDICAL RECORD NUMBER:  [id number]  DATE:  1/30/2024        Return Appointment:  [x] Dressing Supply Provider: THONY  [x] Home Healthcare: [facility]-     [x] Return Appointment: 1 Week(s)  [] Nurse Visit:      [x] Discharge from [facility]: [] Healed        [] Consult     Follow-up Information:  [] Ordered Tests:   [x] Referrals: Baldo Pulido MD  - ORTHOPEDIC- PATIENT TO KEEP APPOINTMENT  [] Rx:   [] Other:         Wound Cleansing:   Do not scrub or use excessive force.  Cleanse wound prior to applying a clean dressing with:  [x] Normal Saline OR    [] Keep Wound Dry in Shower     [] Wound Cleanser   [x] Cleanse wound with Mild Soap & Water    [] Other:        Topical Treatments:  Do not apply lotions, creams, or ointments to wound bed unless directed.   [] Apply moisturizing lotion to skin surrounding the wound prior to dressing change.  [] Apply antifungal ointment to skin surrounding the wound prior to dressing change.  [] Apply thin film of moisture barrier ointment to skin immediately around wound.  [] Apply Betadine to skin immediately around wound   [] Other:                 Dressings:                  Wound Location Back    [x] Apply Primary Dressing:                                          [] MediHoney Gel      [] MediHoney Alginate  [] Calcium Alginate with Silver   [] Calcium Alginate without silver              [] Collagen with silver            [] Collagen without Silver    [] Santyl with moist saline gauze                [x] Hydrofera Blue packing: UNTIL WOUND VAC ARRIVES OR IF WOUND VAC MALFUNCTIONS  (cut to size and

## 2024-01-30 NOTE — WOUND CARE
Wound Clinic Physician Orders and Discharge Instructions  [facility]  [address], [address]  [place] [address]  Telephone: [phone]     FAX [phone]    NAME:  Paula Renteria  YOB: 1970  MEDICAL RECORD NUMBER:  [id number]  DATE:  1/30/2024      Return Appointment:  [x] Dressing Supply Provider: THONY  [x] Home Healthcare: [facility] -     [x] Return Appointment: 1 Week(s)  [] Nurse Visit:     [x] Discharge from [facility]: [] Healed        [] Consult    Follow-up Information:  [] Ordered Tests:   [x] Referrals: Baldo Pulido MD  - ORTHOPEDIC- PATIENT TO KEEP APPOINTMENT  [] Rx:   [] Other:       Wound Cleansing:   Do not scrub or use excessive force.  Cleanse wound prior to applying a clean dressing with:  [x] Normal Saline OR   [] Keep Wound Dry in Shower     [] Wound Cleanser   [x] Cleanse wound with Mild Soap & Water    [] Other:       Topical Treatments:  Do not apply lotions, creams, or ointments to wound bed unless directed.   [] Apply moisturizing lotion to skin surrounding the wound prior to dressing change.  [] Apply antifungal ointment to skin surrounding the wound prior to dressing change.  [] Apply thin film of moisture barrier ointment to skin immediately around wound.  [] Apply Betadine to skin immediately around wound   [] Other:      Dressings:           Wound Location Back    [x] Apply Primary Dressing:       [] MediHoney Gel [] MediHoney Alginate  [] Calcium Alginate with Silver   [] Calcium Alginate without silver   [] Collagen with silver [] Collagen without Silver    [] Santyl with moist saline gauze     [x] Hydrofera Blue packing: UNTIL WOUND VAC ARRIVES OR IF WOUND VAC MALFUNCTIONS  (cut to size and moistened with normal saline)  [] Hydrofera Blue Ready (cut to size)      [] Normal Saline wet to dry  [] Betadine wet to dry    [] Hydrogel  [] Mepitel     [] Bactroban/Mupirocin   [] Iodoform Packing Strip [] Plain Packing Strip   []

## 2024-01-31 NOTE — PROGRESS NOTES
[facility]   Wound Care and Hyperbaric Oxygen Therapy Center   Medical Staff Progress Note     Paula Renteria  MEDICAL RECORD NUMBER:  [id number]  AGE: 53 y.o.   GENDER: female  : 1970  EPISODE DATE:  2024    Chief complaint and reason for visit:     Chief Complaint   Patient presents for follow up of her post op lower spine surgical wound complication    Wound Check     back      Patient presenting for follow up evaluation of wound(s) per chief complaint.      Subjective and ROS: Paula Renteria is a 53 y.o. female who presents today for an initial evaluation of a wound/ulcer. Patient is new to me but not to the wound center . Wound duration:  September- last back surgery in  .     History of Wound Context: 54 yo female with history of multiple (14-15) back surgeries over the years, presented to Wound Care in  with post op lower spine surgical wound that persisted and failed to heal.  She has been treated with IV antibiotics.  Wound care included treatment with Hydrofera blue backing and further antibiotics.  Subsequently, the patient returned back to her surgeon for reexcision which she had on  at [facility].  The patient denies any fever or chills but states her temperature runs around 99 at home.       Patient left hospital AMA due to confusion which she attributes to the narcotics.  She has no wound vac and has only used topical dressing. Using mostly Tylenol or Advil for pain control. Saw her surgeon yesterday for follow up. He recommended a wound vac but could not be set up until .  Patient was not satisfied with the wait and came to our wound care clinic.      Her main complaint is pain in the left hip since , pain level 2/10 with sitting or supine, but a 10/10 with walking.  Claims constant sharp pain; walks with a cane. Cause is unclear.     XR of there Lumbar spine (2024 )  has been stable - as orderd by Dr Pulido

## 2024-02-12 ENCOUNTER — HOSPITAL ENCOUNTER (OUTPATIENT)
Facility: HOSPITAL | Age: 54
Discharge: HOME | End: 2024-02-12
Attending: SPECIALIST
Payer: MEDICARE

## 2024-02-12 VITALS
SYSTOLIC BLOOD PRESSURE: 141 MMHG | TEMPERATURE: 98.1 F | RESPIRATION RATE: 18 BRPM | DIASTOLIC BLOOD PRESSURE: 70 MMHG | HEART RATE: 109 BPM

## 2024-02-12 DIAGNOSIS — S31.000A OPEN WOUND OF LOWER BACK: ICD-10-CM

## 2024-02-12 DIAGNOSIS — L98.423 NON-PRESSURE CHRONIC ULCER OF BACK WITH NECROSIS OF MUSCLE (HCC): Primary | ICD-10-CM

## 2024-02-12 DIAGNOSIS — T81.89XA NONHEALING SURGICAL WOUND, INITIAL ENCOUNTER: ICD-10-CM

## 2024-02-12 PROCEDURE — 87186 SC STD MICRODIL/AGAR DIL: CPT

## 2024-02-12 PROCEDURE — 11043 DBRDMT MUSC&/FSCA 1ST 20/<: CPT

## 2024-02-12 PROCEDURE — 87070 CULTURE OTHR SPECIMN AEROBIC: CPT

## 2024-02-12 PROCEDURE — 87185 SC STD ENZYME DETCJ PER NZM: CPT

## 2024-02-12 PROCEDURE — 87205 SMEAR GRAM STAIN: CPT

## 2024-02-12 PROCEDURE — 87176 TISSUE HOMOGENIZATION CULTR: CPT

## 2024-02-12 PROCEDURE — 87077 CULTURE AEROBIC IDENTIFY: CPT

## 2024-02-12 RX ORDER — CLOBETASOL PROPIONATE 0.5 MG/G
OINTMENT TOPICAL ONCE
OUTPATIENT
Start: 2024-02-12 | End: 2024-02-12

## 2024-02-12 RX ORDER — SODIUM CHLOR/HYPOCHLOROUS ACID 0.033 %
SOLUTION, IRRIGATION IRRIGATION ONCE
OUTPATIENT
Start: 2024-02-12 | End: 2024-02-12

## 2024-02-12 RX ORDER — IBUPROFEN 200 MG
TABLET ORAL ONCE
OUTPATIENT
Start: 2024-02-12 | End: 2024-02-12

## 2024-02-12 RX ORDER — BACITRACIN ZINC AND POLYMYXIN B SULFATE 500; 1000 [USP'U]/G; [USP'U]/G
OINTMENT TOPICAL ONCE
OUTPATIENT
Start: 2024-02-12 | End: 2024-02-12

## 2024-02-12 RX ORDER — TRIAMCINOLONE ACETONIDE 1 MG/G
OINTMENT TOPICAL ONCE
OUTPATIENT
Start: 2024-02-12 | End: 2024-02-12

## 2024-02-12 RX ORDER — BETAMETHASONE DIPROPIONATE 0.5 MG/G
CREAM TOPICAL ONCE
OUTPATIENT
Start: 2024-02-12 | End: 2024-02-12

## 2024-02-12 RX ORDER — GINSENG 100 MG
CAPSULE ORAL ONCE
OUTPATIENT
Start: 2024-02-12 | End: 2024-02-12

## 2024-02-12 RX ORDER — GENTAMICIN SULFATE 1 MG/G
OINTMENT TOPICAL ONCE
OUTPATIENT
Start: 2024-02-12 | End: 2024-02-12

## 2024-02-12 NOTE — WOUND CARE
Wound Clinic Physician Orders and Discharge Instructions  [facility]  [address], [address]  [place] [address]  Telephone: [phone]     FAX [phone]    NAME:  Paula Renteria  YOB: 1970  MEDICAL RECORD NUMBER:  [id number]  DATE:  2/12/2024      Return Appointment:  [] Dressing Supply Provider: THONY  [x] Home Healthcare: [facility]-     [x] Return Appointment: 1 Week(s)  [] Nurse Visit:     [] Discharge from [facility]: [] Healed        [] Consult    Follow-up Information:  [x] Ordered Tests: tissue culture obtained in clinic  [x] Referrals: Baldo Pulido MD  - ORTHOPEDIC- PATIENT TO KEEP APPOINTMENT  [] Rx:   [x] Other: Follow up with ID       Wound Cleansing:   Do not scrub or use excessive force.  Cleanse wound prior to applying a clean dressing with:  [x] Normal Saline OR   [] Keep Wound Dry in Shower     [] Wound Cleanser   [] Cleanse wound with Mild Soap & Water    [] Other:       Topical Treatments:  Do not apply lotions, creams, or ointments to wound bed unless directed.   [] Apply moisturizing lotion to skin surrounding the wound prior to dressing change.  [] Apply antifungal ointment to skin surrounding the wound prior to dressing change.  [] Apply thin film of moisture barrier ointment to skin immediately around wound.  [] Apply Betadine to skin immediately around wound   [] Other:      Dressings:           Wound Location Back    [x] Apply Primary Dressing:       [] MediHoney Gel [] MediHoney Alginate  [] Calcium Alginate with Silver   [] Calcium Alginate without silver   [] Collagen with silver [] Collagen without Silver    [] Santyl with moist saline gauze     [x] Hydrofera Blue packing: (cut to size and moistened with normal saline)  [] Hydrofera Blue Ready (cut to size)      [] Normal Saline wet to dry  [] Betadine wet to dry    [] Hydrogel  [] Mepitel     [] Bactroban/Mupirocin   [] Iodoform Packing Strip [] Plain Packing Strip   [] Skin

## 2024-02-12 NOTE — OP NOTE
Procedure Note  Indications: Based on my examination of this patient's wound(s)/ulcer(s) today, debridement is required to promote healing and evaluate the wound base.    Debridement: Excisional Debridement    Using: curette the wound(s)/ulcer(s) was/were debrided down through and including the removal of epidermis, dermis, subcutaneous tissue, and muscle/fascia.  Performed by: Spencer Lopez MD  Consent obtained: Yes  Time out taken: Yes  Pain Control:         Devitalized Tissue Debrided: fibrin, biofilm, and slough    Pre Debridement Measurements:  Are located in the Wound/Ulcer Documentation Flow Sheet    Diabetic/Pressure/Non Pressure Ulcers only:  Ulcer: Non-Pressure ulcer, muscle necrosis     Wound/Ulcer #: 1  Post Debridement Measurements:  Wound/Ulcer Descriptions are Pre Debridement except measurements:  Wound 01/23/24 Back Lower #1 (Active)   Wound Image   02/12/24 0959   Wound Etiology Non-Healing Surgical 02/12/24 0959   Dressing Status Old drainage noted 02/12/24 0959   Wound Cleansed Cleansed with saline 02/12/24 0959   Wound Length (cm) 3.5 cm 02/12/24 0959   Wound Width (cm) 0.8 cm 02/12/24 0959   Wound Depth (cm) 3.4 cm 02/12/24 0959   Wound Surface Area (cm^2) 2.8 cm^2 02/12/24 0959   Change in Wound Size % (l*w) 12.5 02/12/24 0959   Wound Volume (cm^3) 9.52 cm^3 02/12/24 0959   Wound Healing % 20 02/12/24 0959   Post-Procedure Length (cm) 3.7 cm 02/12/24 1109   Post-Procedure Width (cm) 1 cm 02/12/24 1109   Post-Procedure Depth (cm) 3.6 cm 02/12/24 1109   Post-Procedure Surface Area (cm^2) 3.7 cm^2 02/12/24 1109   Post-Procedure Volume (cm^3) 13.32 cm^3 02/12/24 1109   Undermining Starts ___ O'Clock 12 02/12/24 0959   Undermining Ends___ O'Clock 12 02/12/24 0959   Undermining Maxium Distance (cm) 4.5 02/12/24 0959   Wound Assessment Granulation tissue;Slough;Exposed structure muscle;Exposed structure bone;Exposed hardware 02/12/24 0959   Drainage Amount Large (50-75% saturated) 02/12/24 0959

## 2024-02-12 NOTE — DISCHARGE INSTRUCTIONS
Wound Clinic Physician Orders and Discharge Instructions  [facility]  [address], [address]  [place] [address]  Telephone: [phone]     FAX [phone]    NAME:  Paula Renteria  YOB: 1970  MEDICAL RECORD NUMBER:  [id number]  DATE:  2/12/2024      Return Appointment:  [] Dressing Supply Provider: THONY  [x] Home Healthcare: [facility]-     [x] Return Appointment: 1 Week(s)  [] Nurse Visit:     [] Discharge from [facility]: [] Healed        [] Consult    Follow-up Information:  [x] Ordered Tests: tissue culture obtained in clinic  [x] Referrals: Baldo Pulido MD  - ORTHOPEDIC- PATIENT TO KEEP APPOINTMENT  [] Rx:   [x] Other: Follow up with ID       Wound Cleansing:   Do not scrub or use excessive force.  Cleanse wound prior to applying a clean dressing with:  [x] Normal Saline OR   [] Keep Wound Dry in Shower     [] Wound Cleanser   [] Cleanse wound with Mild Soap & Water    [] Other:       Topical Treatments:  Do not apply lotions, creams, or ointments to wound bed unless directed.   [] Apply moisturizing lotion to skin surrounding the wound prior to dressing change.  [] Apply antifungal ointment to skin surrounding the wound prior to dressing change.  [] Apply thin film of moisture barrier ointment to skin immediately around wound.  [] Apply Betadine to skin immediately around wound   [] Other:      Dressings:           Wound Location Back    [x] Apply Primary Dressing:       [] MediHoney Gel [] MediHoney Alginate  [] Calcium Alginate with Silver   [] Calcium Alginate without silver   [] Collagen with silver [] Collagen without Silver    [] Santyl with moist saline gauze     [x] Hydrofera Blue packing: (cut to size and moistened with normal saline)  [] Hydrofera Blue Ready (cut to size)      [] Normal Saline wet to dry  [] Betadine wet to dry    [] Hydrogel  [] Mepitel     [] Bactroban/Mupirocin   [] Iodoform Packing Strip [] Plain Packing Strip   [] Skin Sub:

## 2024-02-12 NOTE — CONSULTS
[facility]   Wound Care and Hyperbaric Oxygen Therapy Center   Medical Staff Note     Paula Renteria  MEDICAL RECORD NUMBER:  [id number]  AGE: 53 y.o.   GENDER: female  : 1970  EPISODE DATE:  2024      Chief Complaint:   Chief Complaint   Patient presents with    Wound Check     back       History of Present Illness:     Paula Renteria is a 53 y.o. female who presents today for wound/ulcer evaluation.  She has a longstanding history of back problem for which she had undergone surgery multiple times.  The most recent one was an I&D of her lower back infection performed by Dr. Pulido in an outside hospital.  She has been referred back to wound center here for further management.  She had also seen Dr. Ramirez for her left hip pain and  was recommended to see Dr. Gonsalez for infectious disease consult.    History of Wound Context: Per original history and physical on this patient. Changes in history since last evaluation: None  Wound/Ulcer Pain Timing/Severity: PAIN ASSESSMENT WOUND: intermittent  Quality of pain: PAIN QUALITY: dull  Severity:  2 / 10   Modifying Factors: Modifying Factors Wound: Pain worsens with walking  Associated Signs/Symptoms: ASSOCIATED SYMPTOMS WOUND: erythema, drainage, and pain    Ulcer Identification:  Ulcer Type: Wound type: Nonhealing postsurgical open wound.    Contributing Factors: HEALTH FACTORS: chronic pressure and smoking    Wound: Lower back open wound following recent incision and drainage    Past Medical History:       Diagnosis Date    Thyroid disease        Past Surgical History:   Past Surgical History:   Procedure Laterality Date    BACK SURGERY      CHOLECYSTECTOMY      COLON SURGERY      JOINT REPLACEMENT      REVISION TOTAL KNEE ARTHROPLASTY Right        Allergy:  Allergies   Allergen Reactions    Oatmeal Hives    Vancomycin Hives       Social History:   Social History     Tobacco Use    Smoking status: Every Day     Current packs/day: 0.50

## 2024-02-15 ENCOUNTER — OFFICE VISIT (OUTPATIENT)
Age: 54
End: 2024-02-15
Payer: MEDICARE

## 2024-02-15 VITALS
WEIGHT: 150 LBS | SYSTOLIC BLOOD PRESSURE: 114 MMHG | BODY MASS INDEX: 28.32 KG/M2 | RESPIRATION RATE: 16 BRPM | HEIGHT: 61 IN | TEMPERATURE: 98.1 F | HEART RATE: 99 BPM | OXYGEN SATURATION: 95 % | DIASTOLIC BLOOD PRESSURE: 77 MMHG

## 2024-02-15 DIAGNOSIS — M00.9 PYOGENIC ARTHRITIS OF LEFT HIP, DUE TO UNSPECIFIED ORGANISM (HCC): ICD-10-CM

## 2024-02-15 DIAGNOSIS — G89.29 CHRONIC BACK PAIN, UNSPECIFIED BACK LOCATION, UNSPECIFIED BACK PAIN LATERALITY: ICD-10-CM

## 2024-02-15 DIAGNOSIS — M46.26 OSTEOMYELITIS OF LUMBAR SPINE (HCC): Primary | ICD-10-CM

## 2024-02-15 DIAGNOSIS — M54.9 CHRONIC BACK PAIN, UNSPECIFIED BACK LOCATION, UNSPECIFIED BACK PAIN LATERALITY: ICD-10-CM

## 2024-02-15 PROCEDURE — 99203 OFFICE O/P NEW LOW 30 MIN: CPT | Performed by: INTERNAL MEDICINE

## 2024-02-15 RX ORDER — ALBUTEROL SULFATE 90 UG/1
AEROSOL, METERED RESPIRATORY (INHALATION)
COMMUNITY
Start: 2018-07-10

## 2024-02-15 RX ORDER — PSEUDOEPHEDRINE HCL 30 MG
100 TABLET ORAL DAILY
COMMUNITY

## 2024-02-15 RX ORDER — ALPRAZOLAM 0.5 MG
TABLET ORAL
COMMUNITY
Start: 2019-06-27

## 2024-02-15 RX ORDER — DULOXETIN HYDROCHLORIDE 60 MG/1
60 CAPSULE, DELAYED RELEASE ORAL 2 TIMES DAILY
COMMUNITY
Start: 2022-03-22

## 2024-02-15 NOTE — PROGRESS NOTES
Chief Complaint   Patient presents with    New Patient    Other     Chronic infection     /77 (Site: Left Upper Arm, Position: Sitting, Cuff Size: Medium Adult)   Pulse 99   Temp 98.1 °F (36.7 °C) (Oral)   Resp 16   Ht 1.549 m (5' 1\")   Wt 68 kg (150 lb)   SpO2 95%   BMI 28.34 kg/m²     
of her hip infection including joint aspiration and Cx   -Recommend aspiration and Cx of left hip prior to initiation of antibiotic  -She recently completed long term antibiotics, unclear if continued antibiotics will be of any benefit   -Will discuss plan of care w ortho before proceeding w antibiotic therapy     Chronic back pain, unspecified back location, unspecified back pain laterality  - Mgt per primary

## 2024-02-16 LAB
BACTERIA SPEC CULT: NORMAL
GRAM STN SPEC: NORMAL
GRAM STN SPEC: NORMAL
Lab: NORMAL

## 2024-02-19 ENCOUNTER — HOSPITAL ENCOUNTER (OUTPATIENT)
Facility: HOSPITAL | Age: 54
Discharge: HOME | End: 2024-02-19
Attending: SPECIALIST
Payer: MEDICARE

## 2024-02-19 VITALS
RESPIRATION RATE: 16 BRPM | HEART RATE: 95 BPM | TEMPERATURE: 97.8 F | SYSTOLIC BLOOD PRESSURE: 184 MMHG | DIASTOLIC BLOOD PRESSURE: 70 MMHG

## 2024-02-19 DIAGNOSIS — L98.423 NON-PRESSURE CHRONIC ULCER OF BACK WITH NECROSIS OF MUSCLE (HCC): ICD-10-CM

## 2024-02-19 DIAGNOSIS — T81.89XA NONHEALING SURGICAL WOUND, INITIAL ENCOUNTER: ICD-10-CM

## 2024-02-19 DIAGNOSIS — S31.000A OPEN WOUND OF LOWER BACK: Primary | ICD-10-CM

## 2024-02-19 PROCEDURE — 97605 NEG PRS WND THER DME<=50SQCM: CPT

## 2024-02-19 PROCEDURE — 11043 DBRDMT MUSC&/FSCA 1ST 20/<: CPT

## 2024-02-19 RX ORDER — SODIUM CHLOR/HYPOCHLOROUS ACID 0.033 %
SOLUTION, IRRIGATION IRRIGATION ONCE
OUTPATIENT
Start: 2024-02-19 | End: 2024-02-19

## 2024-02-19 RX ORDER — GENTAMICIN SULFATE 1 MG/G
OINTMENT TOPICAL ONCE
OUTPATIENT
Start: 2024-02-19 | End: 2024-02-19

## 2024-02-19 RX ORDER — TRIAMCINOLONE ACETONIDE 1 MG/G
OINTMENT TOPICAL ONCE
OUTPATIENT
Start: 2024-02-19 | End: 2024-02-19

## 2024-02-19 RX ORDER — GINSENG 100 MG
CAPSULE ORAL ONCE
OUTPATIENT
Start: 2024-02-19 | End: 2024-02-19

## 2024-02-19 RX ORDER — CLOBETASOL PROPIONATE 0.5 MG/G
OINTMENT TOPICAL ONCE
OUTPATIENT
Start: 2024-02-19 | End: 2024-02-19

## 2024-02-19 RX ORDER — BETAMETHASONE DIPROPIONATE 0.5 MG/G
CREAM TOPICAL ONCE
OUTPATIENT
Start: 2024-02-19 | End: 2024-02-19

## 2024-02-19 RX ORDER — IBUPROFEN 200 MG
TABLET ORAL ONCE
OUTPATIENT
Start: 2024-02-19 | End: 2024-02-19

## 2024-02-19 RX ORDER — BACITRACIN ZINC AND POLYMYXIN B SULFATE 500; 1000 [USP'U]/G; [USP'U]/G
OINTMENT TOPICAL ONCE
OUTPATIENT
Start: 2024-02-19 | End: 2024-02-19

## 2024-02-19 ASSESSMENT — PAIN SCALES - GENERAL: PAINLEVEL_OUTOF10: 5

## 2024-02-19 NOTE — WOUND CARE
Negative Pressure Wound Therapy    NAME:  Paula Renterai  YOB: 1970  MEDICAL RECORD NUMBER:  [id number]  DATE:  2/19/2024    Applied Negative Pressure to Back wound(s)/ulcer(s).  [x] Applied skin barrier prep to janusz-wound.   [x] Cut strips of plastic drape to picture frame wound so that janusz-wound is covered with the drape.   [x] If bridging dressing to less prominent site, cover any intact skin that will come in contact with the Negative Pressure Therapy sponge, gauze or channel drain with plastic drape. The sponge should never touch intact skin.   [x] Cut sponge, gauze or channel drain to size which will fit into the wound/ulcer bed without being forced.   [x] Be sure the sponge is large enough to hold the entire round plastic flange which is attached to the tubing. Never allow flange to be larger than the sponge or it will produce suction damaging intact skin.  Total number of individual pieces of foam used within the wound bed: 2 total - 1 piece of hydrofera blue and 1 piece of black foam    [x] If bridging the dressing away from the primary site, be sure the bridge leads to a piece of sponge large enough to hold the entire flange without allowing any of the flange to overlap onto intact skin.   [x] Covered sponge, gauze or channel drain with plastic drape.   [x] Cut a hole in this plastic drape directly over the sponge the same size as the plastic drain tubing.   [x] Removed plastic liner from flange and apply it directly over the hole you cut.   [x] Removed the plastic cover from the flange.   [x] Attached the tubing to the wound/ulcer Negative Pressure Therapy and turn it on to be sure a vacuum is created and that there are no leaks.   [x] If air leaks occur, use plastic drape to patch them.   [] Secured Negative Pressure Therapy dressing with ace wrap loosely if located on an extremity. Maintain tubing outside of ace wrap. Tubing must not exert pressure on intact skin.    Applied per

## 2024-02-19 NOTE — DISCHARGE INSTRUCTIONS
[] Gauze [] Loyda [] Kerlix   [] Zetuvit Plus Silicone Border [] Super Absorbant [] ABD     [] Ace Wrap [] Other:    Limit contact of tape with skin.    [x] Change dressing: [] Daily    [] Every Other Day   [] Twice per week   [x] Three times per week   [] Once a week [] Do Not Change Dressing   [] Other:     Negative Pressure:           Wound Location: BACK-   [x] Pressure @ 125 mm/Hg  [x]Continuous []Intermittent   [x] Black Foam  [x] Hydrofera blue TO UNDERMINING AND TUNNELING [x] Bridge for comfort if needed  [x] Change dressing 3 times per week     [x] Other:skin prep to janusz-wound       Dietary:  [x] Diet as tolerated: [] Calorie Diabetic Diet: [] No Added Salt:  [x] Increase Protein: [] Other:     Activity:  [x] Activity as tolerated:    [] Patient has no activity restrictions      [] Strict Bedrest   [] Remain off Work      [] May return to full duty work                                     [] Return to work with restrictions     Physician:  [] Dr. Giselle Bridges  [] Dr. Du Calderon  [x] Dr. Spencer Lopez      Nurse Case Manger: Krista      Wound Care Center Information: Should you experience any significant changes in your wound(s) or have questions about your wound care, please contact the Wound Center at [phone]. Our hours are Monday - Friday 8am - 4:30pm, closed all major holidays. If you need help with your wound outside these hours and cannot wait until we are again available, contact your PCP or go to the hospital emergency room.     PLEASE NOTE: IF YOU ARE UNABLE TO OBTAIN WOUND SUPPLIES, CONTINUE TO USE THE SUPPLIES YOU HAVE AVAILABLE UNTIL YOU ARE ABLE TO REACH US. IT IS MOST IMPORTANT TO KEEP THE WOUND COVERED AT ALL TIMES.

## 2024-02-19 NOTE — PROGRESS NOTES
record  Independently interpreting results (not reported separately) and communicating results to the patient/family/caregiver  Care coordination (not reported separately)    Electronically signed by Spencer Lopez MD on 2/19/2024 at 11:53 AM

## 2024-02-19 NOTE — WOUND CARE
Wound Clinic Physician Orders and Discharge Instructions  [facility]  [address], [address]  [place] [address]  Telephone: [phone]     FAX [phone]    NAME:  Paula Renteria  YOB: 1970  MEDICAL RECORD NUMBER:  [id number]  DATE:  2/19/2024      Return Appointment:  [] Dressing Supply Provider: THONY  [x] Home Healthcare: [facility]   [x] Return Appointment: 2 Week(s)  [] Nurse Visit:     [] Discharge from [facility]: [] Healed        [] Consult    Follow-up Information:  [] Ordered Tests:   [x] Referrals: Baldo Pulido MD  - ORTHOPEDIC- PATIENT TO KEEP APPOINTMENT  [] Rx:   [x] Other: ID 2/29/24      Wound Cleansing:   Do not scrub or use excessive force.  Cleanse wound prior to applying a clean dressing with:  [x] Normal Saline OR   [] Keep Wound Dry in Shower     [] Wound Cleanser   [] Cleanse wound with Mild Soap & Water    [] Other:       Topical Treatments:  Do not apply lotions, creams, or ointments to wound bed unless directed.   [] Apply moisturizing lotion to skin surrounding the wound prior to dressing change.  [] Apply antifungal ointment to skin surrounding the wound prior to dressing change.  [] Apply thin film of moisture barrier ointment to skin immediately around wound.  [] Apply Betadine to skin immediately around wound   [] Other:      Dressings:           Wound Location Back    [x] Apply Primary Dressing:       [] MediHoney Gel [] MediHoney Alginate  [] Calcium Alginate with Silver   [] Calcium Alginate without silver   [] Collagen with silver [] Collagen without Silver    [] Santyl with moist saline gauze     [x] Hydrofera Blue packing: (cut to size and moistened with normal saline)  [] Hydrofera Blue Ready (cut to size)      [] Normal Saline wet to dry  [] Betadine wet to dry    [] Hydrogel  [] Mepitel     [] Bactroban/Mupirocin   [] Iodoform Packing Strip [] Plain Packing Strip   [] Skin Sub:   [] Other:      [x] Cover and Secure with:

## 2024-02-19 NOTE — OP NOTE
Procedure Note  Indications: Based on my examination of this patient's wound(s)/ulcer(s) today, debridement is required to promote healing and evaluate the wound base.    Debridement: Excisional Debridement    Using: curette the wound(s)/ulcer(s) was/were debrided down through and including the removal of epidermis, dermis, subcutaneous tissue, and muscle/fascia.  Performed by: Spencer Lopez MD  Consent obtained: Yes  Time out taken: Yes  Pain Control: Anesthetic  Anesthetic: 4% Lidocaine Liquid Topical       Devitalized Tissue Debrided: fibrin, biofilm, and slough    Pre Debridement Measurements:  Are located in the Wound/Ulcer Documentation Flow Sheet    Diabetic/Pressure/Non Pressure Ulcers only:  Ulcer: Other: Nonhealing surgical      Wound/Ulcer #: 1  Post Debridement Measurements:  Wound/Ulcer Descriptions are Pre Debridement except measurements:  Wound 01/23/24 Back Lower #1 (Active)   Wound Image   02/19/24 1113   Wound Etiology Non-Healing Surgical 02/19/24 1113   Dressing Status Old drainage noted 02/19/24 1113   Wound Cleansed Cleansed with saline 02/19/24 1113   Wound Length (cm) 3.5 cm 02/19/24 1113   Wound Width (cm) 1.1 cm 02/19/24 1113   Wound Depth (cm) 3.3 cm 02/19/24 1113   Wound Surface Area (cm^2) 3.85 cm^2 02/19/24 1113   Change in Wound Size % (l*w) -20.31 02/19/24 1113   Wound Volume (cm^3) 12.705 cm^3 02/19/24 1113   Wound Healing % -7 02/19/24 1113   Post-Procedure Length (cm) 3.7 cm 02/19/24 1141   Post-Procedure Width (cm) 1.3 cm 02/19/24 1141   Post-Procedure Depth (cm) 3.5 cm 02/19/24 1141   Post-Procedure Surface Area (cm^2) 4.81 cm^2 02/19/24 1141   Post-Procedure Volume (cm^3) 16.835 cm^3 02/19/24 1141   Undermining Starts ___ O'Clock 12 02/19/24 1113   Undermining Ends___ O'Clock 12 02/19/24 1113   Undermining Maxium Distance (cm) 4.2 02/19/24 1113   Wound Assessment Granulation tissue;Slough;Exposed structure muscle;Exposed structure bone;Exposed hardware 02/19/24 1113

## 2024-02-28 ENCOUNTER — HOSPITAL ENCOUNTER (INPATIENT)
Facility: HOSPITAL | Age: 54
LOS: 14 days | Discharge: NURSING, REHAB OR LONG-TERM CARE | DRG: 481 | End: 2024-03-13
Attending: STUDENT IN AN ORGANIZED HEALTH CARE EDUCATION/TRAINING PROGRAM | Admitting: INTERNAL MEDICINE
Payer: MEDICARE

## 2024-02-28 ENCOUNTER — APPOINTMENT (OUTPATIENT)
Facility: HOSPITAL | Age: 54
DRG: 481 | End: 2024-02-28
Payer: MEDICARE

## 2024-02-28 DIAGNOSIS — M79.605 ACUTE LEG PAIN, LEFT: ICD-10-CM

## 2024-02-28 DIAGNOSIS — M01.X59: ICD-10-CM

## 2024-02-28 DIAGNOSIS — T84.63XA INFLAMMATORY REACTION DUE TO INTERNAL FIXATION DEVICE OF SPINE, INITIAL ENCOUNTER (HCC): ICD-10-CM

## 2024-02-28 DIAGNOSIS — M00.9 PYOGENIC ARTHRITIS OF LEFT HIP, DUE TO UNSPECIFIED ORGANISM (HCC): ICD-10-CM

## 2024-02-28 DIAGNOSIS — M25.552 PAIN OF LEFT HIP: Primary | ICD-10-CM

## 2024-02-28 LAB
ALBUMIN SERPL-MCNC: 2.7 G/DL (ref 3.5–5)
ALBUMIN/GLOB SERPL: 0.5 (ref 1.1–2.2)
ALP SERPL-CCNC: 289 U/L (ref 45–117)
ALT SERPL-CCNC: 32 U/L (ref 12–78)
ANION GAP SERPL CALC-SCNC: 3 MMOL/L (ref 5–15)
APPEARANCE UR: CLEAR
AST SERPL W P-5'-P-CCNC: 32 U/L (ref 15–37)
BACTERIA URNS QL MICRO: NEGATIVE /HPF
BASOPHILS # BLD: 0 K/UL (ref 0–0.1)
BASOPHILS NFR BLD: 0 % (ref 0–1)
BILIRUB SERPL-MCNC: 0.4 MG/DL (ref 0.2–1)
BILIRUB UR QL: NEGATIVE
BUN SERPL-MCNC: 12 MG/DL (ref 6–20)
BUN/CREAT SERPL: 30 (ref 12–20)
CA-I BLD-MCNC: 9.2 MG/DL (ref 8.5–10.1)
CHLORIDE SERPL-SCNC: 104 MMOL/L (ref 97–108)
CO2 SERPL-SCNC: 29 MMOL/L (ref 21–32)
COLOR UR: ABNORMAL
CREAT SERPL-MCNC: 0.4 MG/DL (ref 0.55–1.02)
CRP SERPL-MCNC: 14.2 MG/DL (ref 0–0.3)
DIFFERENTIAL METHOD BLD: ABNORMAL
EOSINOPHIL # BLD: 0.1 K/UL (ref 0–0.4)
EOSINOPHIL NFR BLD: 2 % (ref 0–7)
EPITH CASTS URNS QL MICRO: ABNORMAL /LPF
ERYTHROCYTE [DISTWIDTH] IN BLOOD BY AUTOMATED COUNT: 20.1 % (ref 11.5–14.5)
ERYTHROCYTE [SEDIMENTATION RATE] IN BLOOD: 108 MM/HR (ref 0–30)
GLOBULIN SER CALC-MCNC: 5.1 G/DL (ref 2–4)
GLUCOSE SERPL-MCNC: 84 MG/DL (ref 65–100)
GLUCOSE UR STRIP.AUTO-MCNC: NEGATIVE MG/DL
HCT VFR BLD AUTO: 30.1 % (ref 35–47)
HGB BLD-MCNC: 8.8 G/DL (ref 11.5–16)
HGB UR QL STRIP: NEGATIVE
IMM GRANULOCYTES # BLD AUTO: 0 K/UL (ref 0–0.04)
IMM GRANULOCYTES NFR BLD AUTO: 0 % (ref 0–0.5)
KETONES UR QL STRIP.AUTO: NEGATIVE MG/DL
LEUKOCYTE ESTERASE UR QL STRIP.AUTO: NEGATIVE
LYMPHOCYTES # BLD: 1.1 K/UL (ref 0.8–3.5)
LYMPHOCYTES NFR BLD: 18 % (ref 12–49)
MCH RBC QN AUTO: 22 PG (ref 26–34)
MCHC RBC AUTO-ENTMCNC: 29.2 G/DL (ref 30–36.5)
MCV RBC AUTO: 75.3 FL (ref 80–99)
MONOCYTES # BLD: 0.4 K/UL (ref 0–1)
MONOCYTES NFR BLD: 7 % (ref 5–13)
MUCOUS THREADS URNS QL MICRO: ABNORMAL /LPF
NEUTS SEG # BLD: 4.6 K/UL (ref 1.8–8)
NEUTS SEG NFR BLD: 73 % (ref 32–75)
NITRITE UR QL STRIP.AUTO: NEGATIVE
NRBC # BLD: 0 K/UL (ref 0–0.01)
NRBC BLD-RTO: 0 PER 100 WBC
PH UR STRIP: 5 (ref 5–8)
PLATELET # BLD AUTO: 426 K/UL (ref 150–400)
PMV BLD AUTO: 8.6 FL (ref 8.9–12.9)
POTASSIUM SERPL-SCNC: 3.5 MMOL/L (ref 3.5–5.1)
PROT SERPL-MCNC: 7.8 G/DL (ref 6.4–8.2)
PROT UR STRIP-MCNC: NEGATIVE MG/DL
RBC # BLD AUTO: 4 M/UL (ref 3.8–5.2)
RBC #/AREA URNS HPF: ABNORMAL /HPF (ref 0–5)
RBC MORPH BLD: ABNORMAL
SODIUM SERPL-SCNC: 136 MMOL/L (ref 136–145)
SP GR UR REFRACTOMETRY: >1.03 (ref 1–1.03)
URINE CULTURE IF INDICATED: ABNORMAL
UROBILINOGEN UR QL STRIP.AUTO: 0.1 EU/DL (ref 0.1–1)
WBC # BLD AUTO: 6.2 K/UL (ref 3.6–11)
WBC URNS QL MICRO: ABNORMAL /HPF (ref 0–4)

## 2024-02-28 PROCEDURE — 85025 COMPLETE CBC W/AUTO DIFF WBC: CPT

## 2024-02-28 PROCEDURE — 96374 THER/PROPH/DIAG INJ IV PUSH: CPT

## 2024-02-28 PROCEDURE — 36415 COLL VENOUS BLD VENIPUNCTURE: CPT

## 2024-02-28 PROCEDURE — 6370000000 HC RX 637 (ALT 250 FOR IP)

## 2024-02-28 PROCEDURE — 6360000002 HC RX W HCPCS

## 2024-02-28 PROCEDURE — 72194 CT PELVIS W/O & W/DYE: CPT

## 2024-02-28 PROCEDURE — 1100000000 HC RM PRIVATE

## 2024-02-28 PROCEDURE — 86140 C-REACTIVE PROTEIN: CPT

## 2024-02-28 PROCEDURE — 87040 BLOOD CULTURE FOR BACTERIA: CPT

## 2024-02-28 PROCEDURE — 80053 COMPREHEN METABOLIC PANEL: CPT

## 2024-02-28 PROCEDURE — 6360000004 HC RX CONTRAST MEDICATION: Performed by: INTERNAL MEDICINE

## 2024-02-28 PROCEDURE — 85652 RBC SED RATE AUTOMATED: CPT

## 2024-02-28 PROCEDURE — 99222 1ST HOSP IP/OBS MODERATE 55: CPT | Performed by: INTERNAL MEDICINE

## 2024-02-28 PROCEDURE — 81001 URINALYSIS AUTO W/SCOPE: CPT

## 2024-02-28 PROCEDURE — 87154 CUL TYP ID BLD PTHGN 6+ TRGT: CPT

## 2024-02-28 PROCEDURE — 2580000003 HC RX 258

## 2024-02-28 PROCEDURE — 6360000002 HC RX W HCPCS: Performed by: STUDENT IN AN ORGANIZED HEALTH CARE EDUCATION/TRAINING PROGRAM

## 2024-02-28 PROCEDURE — 99285 EMERGENCY DEPT VISIT HI MDM: CPT

## 2024-02-28 RX ORDER — MAGNESIUM SULFATE IN WATER 40 MG/ML
2000 INJECTION, SOLUTION INTRAVENOUS PRN
Status: DISCONTINUED | OUTPATIENT
Start: 2024-02-28 | End: 2024-03-13 | Stop reason: HOSPADM

## 2024-02-28 RX ORDER — POTASSIUM CHLORIDE 20 MEQ/1
40 TABLET, EXTENDED RELEASE ORAL PRN
Status: DISCONTINUED | OUTPATIENT
Start: 2024-02-28 | End: 2024-03-13 | Stop reason: HOSPADM

## 2024-02-28 RX ORDER — SODIUM CHLORIDE 0.9 % (FLUSH) 0.9 %
5-40 SYRINGE (ML) INJECTION PRN
Status: DISCONTINUED | OUTPATIENT
Start: 2024-02-28 | End: 2024-03-08

## 2024-02-28 RX ORDER — ACETAMINOPHEN 325 MG/1
650 TABLET ORAL EVERY 6 HOURS PRN
Status: DISCONTINUED | OUTPATIENT
Start: 2024-02-28 | End: 2024-03-06

## 2024-02-28 RX ORDER — OXCARBAZEPINE 150 MG/1
150 TABLET, FILM COATED ORAL 3 TIMES DAILY
Status: DISCONTINUED | OUTPATIENT
Start: 2024-02-28 | End: 2024-02-29

## 2024-02-28 RX ORDER — DOCUSATE SODIUM 100 MG/1
100 CAPSULE, LIQUID FILLED ORAL DAILY PRN
Status: DISCONTINUED | OUTPATIENT
Start: 2024-02-28 | End: 2024-03-13 | Stop reason: HOSPADM

## 2024-02-28 RX ORDER — ENOXAPARIN SODIUM 100 MG/ML
40 INJECTION SUBCUTANEOUS DAILY
Status: DISCONTINUED | OUTPATIENT
Start: 2024-02-29 | End: 2024-03-13 | Stop reason: HOSPADM

## 2024-02-28 RX ORDER — ALPRAZOLAM 0.25 MG/1
0.5 TABLET ORAL NIGHTLY PRN
Status: DISCONTINUED | OUTPATIENT
Start: 2024-02-28 | End: 2024-03-13 | Stop reason: HOSPADM

## 2024-02-28 RX ORDER — POLYETHYLENE GLYCOL 3350 17 G/17G
17 POWDER, FOR SOLUTION ORAL DAILY PRN
Status: DISCONTINUED | OUTPATIENT
Start: 2024-02-28 | End: 2024-03-13 | Stop reason: HOSPADM

## 2024-02-28 RX ORDER — LIDOCAINE 4 G/G
1 PATCH TOPICAL DAILY
COMMUNITY

## 2024-02-28 RX ORDER — TROLAMINE SALICYLATE 10 G/100G
CREAM TOPICAL PRN
COMMUNITY

## 2024-02-28 RX ORDER — BACLOFEN 10 MG/1
20 TABLET ORAL 2 TIMES DAILY
Status: DISCONTINUED | OUTPATIENT
Start: 2024-02-28 | End: 2024-03-13 | Stop reason: HOSPADM

## 2024-02-28 RX ORDER — ONDANSETRON 2 MG/ML
4 INJECTION INTRAMUSCULAR; INTRAVENOUS EVERY 6 HOURS PRN
Status: DISCONTINUED | OUTPATIENT
Start: 2024-02-28 | End: 2024-03-13 | Stop reason: HOSPADM

## 2024-02-28 RX ORDER — ACETAMINOPHEN 650 MG/1
650 SUPPOSITORY RECTAL EVERY 6 HOURS PRN
Status: DISCONTINUED | OUTPATIENT
Start: 2024-02-28 | End: 2024-03-06

## 2024-02-28 RX ORDER — ONDANSETRON 4 MG/1
4 TABLET, FILM COATED ORAL EVERY 8 HOURS PRN
COMMUNITY

## 2024-02-28 RX ORDER — ONDANSETRON 4 MG/1
4 TABLET, ORALLY DISINTEGRATING ORAL EVERY 8 HOURS PRN
Status: DISCONTINUED | OUTPATIENT
Start: 2024-02-28 | End: 2024-03-13 | Stop reason: HOSPADM

## 2024-02-28 RX ORDER — MORPHINE SULFATE 4 MG/ML
4 INJECTION, SOLUTION INTRAMUSCULAR; INTRAVENOUS
Status: COMPLETED | OUTPATIENT
Start: 2024-02-28 | End: 2024-02-28

## 2024-02-28 RX ORDER — POTASSIUM CHLORIDE 7.45 MG/ML
10 INJECTION INTRAVENOUS PRN
Status: DISCONTINUED | OUTPATIENT
Start: 2024-02-28 | End: 2024-03-13 | Stop reason: HOSPADM

## 2024-02-28 RX ORDER — OXYCODONE HYDROCHLORIDE 5 MG/1
5 TABLET ORAL EVERY 4 HOURS PRN
Status: DISCONTINUED | OUTPATIENT
Start: 2024-02-28 | End: 2024-02-29

## 2024-02-28 RX ORDER — LIDOCAINE 4 G/G
1 PATCH TOPICAL DAILY
Status: DISCONTINUED | OUTPATIENT
Start: 2024-02-29 | End: 2024-03-13 | Stop reason: HOSPADM

## 2024-02-28 RX ORDER — DULOXETIN HYDROCHLORIDE 30 MG/1
60 CAPSULE, DELAYED RELEASE ORAL 2 TIMES DAILY
Status: DISCONTINUED | OUTPATIENT
Start: 2024-02-28 | End: 2024-03-13 | Stop reason: HOSPADM

## 2024-02-28 RX ORDER — SODIUM CHLORIDE 9 MG/ML
INJECTION, SOLUTION INTRAVENOUS PRN
Status: DISCONTINUED | OUTPATIENT
Start: 2024-02-28 | End: 2024-03-13 | Stop reason: HOSPADM

## 2024-02-28 RX ORDER — ALBUTEROL SULFATE 90 UG/1
2 AEROSOL, METERED RESPIRATORY (INHALATION) EVERY 4 HOURS PRN
Status: DISCONTINUED | OUTPATIENT
Start: 2024-02-28 | End: 2024-03-13 | Stop reason: HOSPADM

## 2024-02-28 RX ORDER — SODIUM CHLORIDE 0.9 % (FLUSH) 0.9 %
5-40 SYRINGE (ML) INJECTION EVERY 12 HOURS SCHEDULED
Status: DISCONTINUED | OUTPATIENT
Start: 2024-02-28 | End: 2024-03-13 | Stop reason: HOSPADM

## 2024-02-28 RX ADMIN — CEFTAZIDIME, AVIBACTAM 2.5 G: 2; .5 POWDER, FOR SOLUTION INTRAVENOUS at 19:00

## 2024-02-28 RX ADMIN — BACLOFEN 20 MG: 10 TABLET ORAL at 21:08

## 2024-02-28 RX ADMIN — MORPHINE SULFATE 4 MG: 4 INJECTION, SOLUTION INTRAMUSCULAR; INTRAVENOUS at 15:06

## 2024-02-28 RX ADMIN — IOPAMIDOL 100 ML: 755 INJECTION, SOLUTION INTRAVENOUS at 17:26

## 2024-02-28 RX ADMIN — ALPRAZOLAM 0.5 MG: 0.25 TABLET ORAL at 23:04

## 2024-02-28 RX ADMIN — DULOXETINE HYDROCHLORIDE 60 MG: 30 CAPSULE, DELAYED RELEASE ORAL at 21:07

## 2024-02-28 RX ADMIN — OXYCODONE 5 MG: 5 TABLET ORAL at 22:08

## 2024-02-28 RX ADMIN — SODIUM CHLORIDE, PRESERVATIVE FREE 10 ML: 5 INJECTION INTRAVENOUS at 21:08

## 2024-02-28 RX ADMIN — ACETAMINOPHEN 650 MG: 325 TABLET ORAL at 22:11

## 2024-02-28 ASSESSMENT — PAIN DESCRIPTION - LOCATION
LOCATION: HIP;GROIN
LOCATION: GROIN
LOCATION: ABDOMEN;HIP

## 2024-02-28 ASSESSMENT — PAIN SCALES - GENERAL
PAINLEVEL_OUTOF10: 9
PAINLEVEL_OUTOF10: 6

## 2024-02-28 ASSESSMENT — PAIN DESCRIPTION - ORIENTATION
ORIENTATION: LEFT
ORIENTATION: LEFT
ORIENTATION: INNER

## 2024-02-28 ASSESSMENT — PAIN DESCRIPTION - DESCRIPTORS
DESCRIPTORS: ACHING

## 2024-02-28 ASSESSMENT — LIFESTYLE VARIABLES
HOW OFTEN DO YOU HAVE A DRINK CONTAINING ALCOHOL: NEVER
HOW MANY STANDARD DRINKS CONTAINING ALCOHOL DO YOU HAVE ON A TYPICAL DAY: PATIENT DOES NOT DRINK

## 2024-02-28 NOTE — PROGRESS NOTES
4 Eyes Skin Assessment     NAME:  Paula Renteria  YOB: 1970  MEDICAL RECORD NUMBER:  [id number]    The patient is being assessed for  Admission    I agree that at least one RN has performed a thorough Head to Toe Skin Assessment on the patient. ALL assessment sites listed below have been assessed.      Areas assessed by both nurses:    Head, Face, Ears, Shoulders, Back, Chest, Arms, Elbows, Hands, Sacrum. Buttock, Coccyx, Ischium, Legs. Feet and Heels, and Under Medical Devices         Does the Patient have a Wound? Yes wound(s) were present on assessment. LDA wound assessment was Initiated and completed by RN       Ranjit Prevention initiated by RN: Yes  Wound Care Orders initiated by RN: Yes    Pressure Injury (Stage 3,4, Unstageable, DTI, NWPT, and Complex wounds) if present, place Wound referral order by RN under : Yes    New Ostomies, if present place, Ostomy referral order under : No     Nurse 1 eSignature: Electronically signed by Renita Velasquez RN on 2/28/24 at 6:11 PM EST    **SHARE this note so that the co-signing nurse can place an eSignature**    Nurse 2 eSignature: Electronically signed by NORAH WIGGINS LPN on 2/28/24 at 8:12 PM EST

## 2024-02-28 NOTE — ED PROVIDER NOTES
[facility] EMERGENCY DEPT  EMERGENCY DEPARTMENT HISTORY AND PHYSICAL EXAM      Date: 2/28/2024  Patient Name: Paula Renteria  MRN: [id number]  Birthdate 1970  Date of evaluation: 2/28/2024  Provider: Prabhakar Reynoso MD   Note Started: 4:42 PM EST 2/28/24    HISTORY OF PRESENT ILLNESS     Chief Complaint   Patient presents with    Hip Pain       History Provided By: Patient    HPI: Paula Renteria is a 53 y.o. female with past medical history as reviewed below as well as chronic left hip wound presents for evaluation of persistent left hip pain.  Patient seen by her orthopedic doctor who recommended admission for washout as he is concerned for infection.  Patient denies any systemic symptoms, no fevers or chills, no  or GI symptoms.  Continues to have wound managed with a wound VAC.  No antibiotics recently    PAST MEDICAL HISTORY   Past Medical History:  Past Medical History:   Diagnosis Date    Thyroid disease        Past Surgical History:  Past Surgical History:   Procedure Laterality Date    BACK SURGERY      CHOLECYSTECTOMY      COLON SURGERY      JOINT REPLACEMENT      REVISION TOTAL KNEE ARTHROPLASTY Right        Family History:  Family History   Problem Relation Age of Onset    Heart Attack Maternal Grandmother     Stroke Maternal Grandmother     Heart Attack Maternal Grandfather     Stroke Maternal Grandfather     Heart Attack Paternal Grandmother     Stroke Paternal Grandmother     Heart Attack Paternal Grandfather     Stroke Paternal Grandfather        Social History:  Social History     Tobacco Use    Smoking status: Every Day     Current packs/day: 0.50     Average packs/day: 0.5 packs/day for 40.0 years (20.0 ttl pk-yrs)     Types: Cigarettes    Smokeless tobacco: Never   Vaping Use    Vaping Use: Never used   Substance Use Topics    Alcohol use: Not Currently    Drug use: Never       Allergies:  Allergies   Allergen Reactions    Latex Hives and Other (See Comments)     blisters    Colloidal Oatmeal  Strain: Not on file   Food Insecurity: Not on file   Transportation Needs: Not on file   Physical Activity: Not on file   Stress: Not on file   Social Connections: Not on file   Intimate Partner Violence: Not on file   Depression: Not at risk (1/18/2023)    PHQ-2    • PHQ-2 Score: 2   Housing Stability: Not on file   Interpersonal Safety: Not on file   Utilities: Not on file       PHYSICAL EXAM   Physical Exam  HENT:      Head: Normocephalic and atraumatic.      Nose: Nose normal.      Mouth/Throat:      Mouth: Mucous membranes are moist.   Eyes:      Extraocular Movements: Extraocular movements intact.      Pupils: Pupils are equal, round, and reactive to light.   Cardiovascular:      Rate and Rhythm: Normal rate and regular rhythm.   Pulmonary:      Effort: Pulmonary effort is normal.      Breath sounds: Normal breath sounds.   Abdominal:      Palpations: Abdomen is soft.      Tenderness: There is no abdominal tenderness.   Musculoskeletal:         General: No deformity.   Skin:     General: Skin is warm and dry.      Comments: Left hip wound with wound VAC   Neurological:      General: No focal deficit present.      Mental Status: She is alert.           SCREENINGS               LAB, EKG AND DIAGNOSTIC RESULTS   Labs:  Recent Results (from the past 12 hour(s))   CBC with Auto Differential    Collection Time: 02/28/24  2:47 PM   Result Value Ref Range    WBC 6.2 3.6 - 11.0 K/uL    RBC 4.00 3.80 - 5.20 M/uL    Hemoglobin 8.8 (L) 11.5 - 16.0 g/dL    Hematocrit 30.1 (L) 35.0 - 47.0 %    MCV 75.3 (L) 80.0 - 99.0 FL    MCH 22.0 (L) 26.0 - 34.0 PG    MCHC 29.2 (L) 30.0 - 36.5 g/dL    RDW 20.1 (H) 11.5 - 14.5 %    Platelets 426 (H) 150 - 400 K/uL    MPV 8.6 (L) 8.9 - 12.9 FL    Nucleated RBCs 0.0 0.0  WBC    nRBC 0.00 0.00 - 0.01 K/uL    Neutrophils % 73 32 - 75 %    Lymphocytes % 18 12 - 49 %    Monocytes % 7 5 - 13 %    Eosinophils % 2 0 - 7 %    Basophils % 0 0 - 1 %    Immature Granulocytes 0 0 - 0.5 %

## 2024-02-28 NOTE — CONSULTS
Infectious Disease Consult Note    Reason for Consult: Suspected left hip infection   Date of Consultation: February 28, 2024  Date of Admission: 2/28/2024  Referring Physician: Hospitalist     HPI: 53-y.o WF referred to the ED by orthopedics for left hip pain and ambulatory difficulty, suspected infection.  Her history is significant for multiple back surgeries w hardware placement, complicated by infection, s/p multiple courses of antibiotics, most recently meropenem, completed in 01/2024. She has a chronic draining wound on her lower back w exposed hardware, followed by [facility] for wound care, currently has a wound vac. She is being followed by ortho for left hip pain w resulting ambulatory difficulty, there is concern for infection per imaging. She is afebrile w a normal WBC on routine labs. CRP was 9.1 and  on 02/12/24. MDR P.aeruginosa and Beta lactamase Bacteroides were isolated from her lower back wound on 02/12/24. She has not been on antibiotics recently. Pt reported left hip pain during my assessment.     Review of Systems:     Gen: Negative for chills, fevers, weight loss, weight gain   CV:  Negative for chest pain, dyspnea on exertion, leg edema   Lungs: Negative for shortness of breath, cough, wheezing   Abdomen: Negative for abdominal pain, nausea, vomiting, diarrhea, constipation   Genitourinary: Negative for genital pain or genital discharge     Neuro: Negative for headache, numbness, tingling, extremity weakness   Skin: Negative for rash, sores/open wounds   Musculoskeletal: Left hip pain    Psych: Negative for manic behavior       Past Medical History:  Past Medical History:   Diagnosis Date    Thyroid disease          Past surgical history     Past Surgical History:   Procedure Laterality Date    BACK SURGERY      CHOLECYSTECTOMY      COLON SURGERY      JOINT REPLACEMENT      REVISION TOTAL KNEE ARTHROPLASTY Right         Social History   Social History     Tobacco Use    Smoking status:  RRR, No murmur   Lungs: CTA B/l, decreased at the bases   Abdomen: soft, ND, NT, +BS   Genitourinary: no genital discharge, no higginbotham  Extremities: Left hip tenderness, DROM  Skin: wound Vac on lower back       Labs:   Recent Labs     02/28/24 1447   WBC 6.2   HGB 8.8*   HCT 30.1*   *     Recent Labs     02/28/24 1447   BUN 12   CREATININE 0.40*       Lab Results   Component Value Date/Time    CRP 14.20 02/28/2024 02:47 PM      No components found for: \"SR\"       Microbiology Data:     Results       Procedure Component Value Units Date/Time    Blood Culture 1 [[id number]] Collected: 02/28/24 1447    Order Status: Sent Specimen: Blood Updated: 02/28/24 1456    Blood Culture 2 [[id number]] Collected: 02/28/24 1447    Order Status: Sent Specimen: Blood Updated: 02/28/24 1457               Imaging:  No results found.    Assessment / Plan:     Suspected septic arthritis involving left hip         Being admitted for surgical debridement by ortho         Afebrile w a normal WBC on routine labs. CRP 14        Start on Avycaz for P.aeruginosa coverage         Routine labs in AM     2. Chronic lower back infection, underlying hardware w draining fistula      Repeated isolation of P.aeruginosa from wound Cx, most recently on 02/12      Wound Vac recently placed on lower back, started on Aycaz as above     3  Left hip pain w ambulatory difficulty: mgt per primary    4. Depression/Bipolar d/o per records       Promise Garrison MD     2/28/2024

## 2024-02-28 NOTE — ED NOTES
ED TO INPATIENT SBAR HANDOFF    Patient Name: Paula Renteria   Preferred Name: Paula ORELLANAB: 1970  53 y.o.   Family/Caregiver Present: no   Code Status Order: No Order  PO Status: NPO:No  Telemetry Order:   C-SSRS: Risk of Suicide: No Risk  Sitter no   Restraints:     Sepsis Risk Score Sepsis Risk Score: 0.54    Situation  Chief Complaint   Patient presents with    Hip Pain     Brief Description of Patient's Condition: Admission per wound specialist  Mental Status: oriented, alert, coherent, logical, thought processes intact, and able to concentrate and follow conversation  Arrived from:Home  Imaging:   CT PELVIS W WO CONTRAST Additional Contrast? None    (Results Pending)     Abnormal labs:   Abnormal Labs Reviewed   CBC WITH AUTO DIFFERENTIAL - Abnormal; Notable for the following components:       Result Value    Hemoglobin 8.8 (*)     Hematocrit 30.1 (*)     MCV 75.3 (*)     MCH 22.0 (*)     MCHC 29.2 (*)     RDW 20.1 (*)     Platelets 426 (*)     MPV 8.6 (*)     All other components within normal limits   COMPREHENSIVE METABOLIC PANEL - Abnormal; Notable for the following components:    Anion Gap 3 (*)     Creatinine 0.40 (*)     Bun/Cre Ratio 30 (*)     Alk Phosphatase 289 (*)     Albumin 2.7 (*)     Globulin 5.1 (*)     Albumin/Globulin Ratio 0.5 (*)     All other components within normal limits   SEDIMENTATION RATE - Abnormal; Notable for the following components:    Sed Rate, Automated 108 (*)     All other components within normal limits   C-REACTIVE PROTEIN - Abnormal; Notable for the following components:    CRP 14.20 (*)     All other components within normal limits       Background  Allergies:   Allergies   Allergen Reactions    Latex Hives and Other (See Comments)     blisters    Colloidal Oatmeal Anaphylaxis    Penicillins Rash, Hives and Swelling     blisters    Gabapentin Other (See Comments)     Lost control of self.    Oatmeal Hives    Vancomycin Hives     History:   Past Medical History:    Diagnosis Date    Thyroid disease        Assessment  Vitals: MEWS Score: 1  Level of Consciousness: Alert (0)   Vitals:    02/28/24 1345 02/28/24 1346   BP: (!) 145/83    Pulse: 93    Resp: 17    Temp: 98.1 °F (36.7 °C)    TempSrc: Oral    SpO2: 97%    Weight:  70.3 kg (155 lb)   Height:  1.575 m (5' 2\")     Deterioration Index (DI): Deterioration Index: 19.27  Deterioration Index (DI) Interventions Performed:    O2 Flow Rate:    O2 Device:    Cardiac Rhythm:    Critical Lab Results: [unfilled]  Cultures: Cultures:Blood  NIH Score: NIH     Active LDA's:   Peripheral IV 02/28/24 Left Antecubital (Active)   Site Assessment Clean, dry & intact 02/28/24 1445   Line Status Blood return noted 02/28/24 1445   Line Care Cap changed 02/28/24 1445   Phlebitis Assessment No symptoms 02/28/24 1445   Infiltration Assessment 0 02/28/24 1445   Alcohol Cap Used Yes 02/28/24 1445   Dressing Status Clean, dry & intact 02/28/24 1445   Dressing Type Transparent 02/28/24 1445     Active Central Lines:                          Active Wounds:    Active Turk's:    Active Feeding Tubes:      Administered Medications:   Medications   morphine (PF) injection 4 mg (4 mg IntraVENous Given 2/28/24 1506)     Last documented pain medication administration: 1506  Pertinent or High Risk Medications/Drips: no   If Yes, please provide details: no  Blood Product Administration: no  If Yes, please provide details: no  Process Protocols/Bundles: Sepsis Protocol/Bundle Completion-yes    Recommendation  Incomplete STAT orders: no  Overdue Medications: no  Patient Belongings:    Additional Comments: no  If any further questions, please call Sending RN at [phone]      Admitting Unit Notification  Name of person notified and time: Narda Win      Electronically signed by: Electronically signed by Ozzie Burr RN on 2/28/2024 at 4:11 PM

## 2024-02-28 NOTE — CARE COORDINATION
02/28/24 1601   Service Assessment   Patient Orientation Alert and Oriented   Cognition Alert   History Provided By Patient   Primary Caregiver Family   Accompanied By/Relationship Patient alone in room.   Support Systems Parent;Children   Patient's Healthcare Decision Maker is: Patient Declined (Legal Next of Kin Remains as Decision Maker)   PCP Verified by CM Yes   Last Visit to PCP Within last 3 months   Prior Functional Level Assistance with the following:;Dressing;Toileting;Bathing   Current Functional Level Toileting;Dressing;Bathing;Assistance with the following:   Can patient return to prior living arrangement Yes   Ability to make needs known: Good   Family able to assist with home care needs: Yes   Would you like for me to discuss the discharge plan with any other family members/significant others, and if so, who? No   Financial Resources Medicare;Medicaid   Community Resources None   Social/Functional History   Lives With Parent;Daughter   Type of Home House   Home Layout Two level   Home Access Stairs to enter with rails   Entrance Stairs - Number of Steps 2   Home Equipment Cane;Rollator   Discharge Planning   Type of Residence House   Living Arrangements Children;Parent   Patient expects to be discharged to: House   Services At/After Discharge   Mode of Transport at Discharge Other (see comment)  (family)   Confirm Follow Up Transport Family       Patient confirmed demographics on chart. Patient lives with her parents and children in a two story house accessible by two steps. She ambulates with a cane or rollator, and her family provides transportation. She has a personal care aide five days a week. Patient reported no prior HH/IRF/SNF. Current discharge plan is to return home. Meds to Beds program offered, and patient accepted.    Advance Care Planning     General Advance Care Planning (ACP) Conversation    Date of Conversation: 2/28/2024  Conducted with: Patient with Decision Making  Capacity    Healthcare Decision Maker:  No healthcare decision makers have been documented.  Click here to complete HealthCare Decision Makers including selection of the Healthcare Decision Maker Relationship (ie \"Primary\")   Today we documented Decision Maker(s) consistent with Legal Next of Kin hierarchy.    Content/Action Overview:  Has ACP document(s) on file - reflects the patient's care preferences  Reviewed DNR/DNI and patient elects Full Code (Attempt Resuscitation)        Length of Voluntary ACP Conversation in minutes:  <16 minutes (Non-Billable)    JONATHAN Silva

## 2024-02-28 NOTE — H&P
Hospitalist Admission Note    NAME: Paula Renteria   :  1970   MRN:  [id number]     Date/Time:  2024 5:06 PM    Patient PCP: Lion Liu MD    ______________________________________________________________________  Given the patient's current clinical presentation, I have a high level of concern for decompensation if discharged from the emergency department.  Complex decision making was performed, which includes reviewing the patient's available past medical records, laboratory results, and x-ray films.       My assessment of this patient's clinical condition and my plan of care is as follows.    Assessment / Plan:  Left hip pain  Likely pyogenic arthritis of left hip   -Pain management  - CT pending, if patient cannot tolerate will order x-rays  - ID consulted, placed on Avycaz    Osteomyelitis of lumbar spine  -Wound VAC in place  - Recent isolation from wound culture shows Pseudomonas aeruginosa  - ID consulted, placed on Avycaz    Depression/bipolar disorder  Anxiety  - Continue Trileptal, Cymbalta, Xanax      Medical Decision Making:   I personally reviewed labs: CBC, CMP, CRP  I personally reviewed imaging: None   Toxic drug monitoring: Lovenox for bleeding, monitor H&H  Discussed case with: ED provider. After discussion I am in agreement that acuity of patient's medical condition necessitates hospital stay.      Code Status: Full  DVT Prophylaxis: Lovenox  GI Prophylaxis: None      Subjective:   CHIEF COMPLAINT: Left hip infection/pain    HISTORY OF PRESENT ILLNESS:     Paula Renteria is a 53 y.o.  female with PMHx significant for several lumbar fusion surgeries, most recently a fusion in  that required several washout surgeries and has had an open wound since.  Patient admitted today because her orthopedic doctor James recommended her to come to the ER.  Patient has a wound VAC on her lower back where she has a fissure superior to her gluteal cleft that has not closed.  She has had a  and posterior  element degenerative changes. Moderate to severe right neuroforaminal  stenosis moderate left neuroforaminal stenosis.      Impression:    Extensive postsurgical changes related to prior spinal fusion and  laminectomies with transpedicular screws retrieval with multilevel  residual ghost tracks. Advanced multilevel endplate degenerative  changes.    Increased osteolytic/endplate changes surrounding the disc prostheses  at L5-S1 with increased oblique orientation since 2018,  concerning/suspicious for failure.    Rim calcified low-attenuation collection in the dorsal paraspinal  surgical bed possibly reflecting chronic postoperative fluid  collection, although true sterilely cannot be ascertained on the basis  of this study.        ** Electronically Signed by Selwyn Faith MD **  **            on 2022 at 0919            **  Reported and signed by: Selwyn Faith MD                          PAGE  2                       Signed Report                    (CONTINUED)      [facility]      Name: PATRICIA AMOS  [address] [place] [address]          Phys[place] [address]             : 1970   Age: 51       Sex: F  Acct: [id number]  Loc: F.RAD  Phone #: [phone]         Exam Date: 2022 Status: DEP CLI  FAX #: [phone]         Rad No: [id number]       URN: [id number]  Unit No: [id number]        EXAMS:                                               CPT CODE:  [id number] CT L-SPINE WO IV CON                       [id number]  <Continued>                                                                        CC: Dionte Thorne MD    Dictated Date/Time: 2022 (832)  Techs: JACKIE L. MCLAUGHLIN RT (R)(CT); Jaime Ballard RT(R)(CT)  Transcribed Date/Time: 2022 (832)  :   Electronic Signature Date/Time: 2022 (919)  Orig Print D/T: S: 2022 (920)  BATCH NO: N/A        PAGE  3                       Signed

## 2024-02-28 NOTE — ED TRIAGE NOTES
Pt c/o L hip pain. She notes she has an infection to the L hip and currently has a wound vac on it. States she was sent by Dr. Ramirez.

## 2024-02-29 LAB
ACCESSION NUMBER, LLC1M: ABNORMAL
ACINETOBACTER CALCOAC BAUMANNII COMPLEX BY PCR: NOT DETECTED
BACTEROIDES FRAGILIS BY PCR: NOT DETECTED
BIOFIRE TEST COMMENT: ABNORMAL
CANDIDA ALBICANS BY PCR: NOT DETECTED
CANDIDA AURIS BY PCR: NOT DETECTED
CANDIDA GLABRATA: NOT DETECTED
CANDIDA KRUSEI BY PCR: NOT DETECTED
CANDIDA PARAPSILOSIS BY PCR: NOT DETECTED
CANDIDA TROPICALIS BY PCR: NOT DETECTED
CRYPTOCOCCUS NEOFORMANS/GATTII BY PCR: NOT DETECTED
ENTEROBACTER CLOACAE COMPLEX BY PCR: NOT DETECTED
ENTEROBACTERALES BY PCR: NOT DETECTED
ENTEROCOCCUS FAECALIS BY PCR: NOT DETECTED
ENTEROCOCCUS FAECIUM BY PCR: NOT DETECTED
ESCHERICHIA COLI: NOT DETECTED
HAEMOPHILUS INFLUENZAE BY PCR: NOT DETECTED
KLEBSIELLA AEROGENES BY PCR: NOT DETECTED
KLEBSIELLA OXYTOCA BY PCR: NOT DETECTED
KLEBSIELLA PNEUMONIAE GROUP BY PCR: NOT DETECTED
LISTERIA MONOCYTOGENES BY PCR: NOT DETECTED
NEISSERIA MENINGITIDIS BY PCR: NOT DETECTED
PROTEUS BY PCR: NOT DETECTED
PSEUDOMONAS AERUGINOSA, PSAEP: NOT DETECTED
RESISTANT GENE TARGETS: ABNORMAL
SALMONELLA SPECIES BY PCR: NOT DETECTED
SERRATIA MARCESCENS BY PCR: NOT DETECTED
STAPHYLOCOCCUS AUREUS: NOT DETECTED
STAPHYLOCOCCUS EPIDERMIDIS BY PCR: NOT DETECTED
STAPHYLOCOCCUS LUGDUNENSIS BY PCR: NOT DETECTED
STAPHYLOCOCCUS: DETECTED
STENOTROPHOMONAS MALTOPHILIA BY PCR: NOT DETECTED
STREPTOCOCCUS AGALACTIAE (GROUP B): NOT DETECTED
STREPTOCOCCUS PNEUMONIAE , SPNP: NOT DETECTED
STREPTOCOCCUS PYOGENES (GROUP A), SPYOP: NOT DETECTED
STREPTOCOCCUS: NOT DETECTED

## 2024-02-29 PROCEDURE — 2500000003 HC RX 250 WO HCPCS

## 2024-02-29 PROCEDURE — 6370000000 HC RX 637 (ALT 250 FOR IP): Performed by: INTERNAL MEDICINE

## 2024-02-29 PROCEDURE — 6360000002 HC RX W HCPCS: Performed by: INTERNAL MEDICINE

## 2024-02-29 PROCEDURE — 1100000000 HC RM PRIVATE

## 2024-02-29 PROCEDURE — 6370000000 HC RX 637 (ALT 250 FOR IP)

## 2024-02-29 PROCEDURE — 2580000003 HC RX 258

## 2024-02-29 PROCEDURE — 6360000002 HC RX W HCPCS

## 2024-02-29 PROCEDURE — 99232 SBSQ HOSP IP/OBS MODERATE 35: CPT | Performed by: INTERNAL MEDICINE

## 2024-02-29 PROCEDURE — 2580000003 HC RX 258: Performed by: INTERNAL MEDICINE

## 2024-02-29 RX ORDER — SODIUM CHLORIDE 9 MG/ML
INJECTION, SOLUTION INTRAVENOUS CONTINUOUS
Status: DISPENSED | OUTPATIENT
Start: 2024-02-29 | End: 2024-03-01

## 2024-02-29 RX ORDER — DIPHENHYDRAMINE HYDROCHLORIDE 50 MG/ML
25 INJECTION INTRAMUSCULAR; INTRAVENOUS PRN
Status: DISCONTINUED | OUTPATIENT
Start: 2024-02-29 | End: 2024-02-29

## 2024-02-29 RX ORDER — OXYCODONE HYDROCHLORIDE 5 MG/1
5 TABLET ORAL EVERY 4 HOURS PRN
Status: DISCONTINUED | OUTPATIENT
Start: 2024-02-29 | End: 2024-03-03

## 2024-02-29 RX ORDER — OXCARBAZEPINE 300 MG/1
300 TABLET, FILM COATED ORAL
Status: DISCONTINUED | OUTPATIENT
Start: 2024-02-29 | End: 2024-03-13 | Stop reason: HOSPADM

## 2024-02-29 RX ORDER — OXCARBAZEPINE 300 MG/1
300 TABLET, FILM COATED ORAL
Status: DISCONTINUED | OUTPATIENT
Start: 2024-03-01 | End: 2024-03-13 | Stop reason: HOSPADM

## 2024-02-29 RX ORDER — HYDROMORPHONE HYDROCHLORIDE 1 MG/ML
0.5 INJECTION, SOLUTION INTRAMUSCULAR; INTRAVENOUS; SUBCUTANEOUS EVERY 4 HOURS PRN
Status: DISCONTINUED | OUTPATIENT
Start: 2024-02-29 | End: 2024-03-03

## 2024-02-29 RX ORDER — NICOTINE 21 MG/24HR
1 PATCH, TRANSDERMAL 24 HOURS TRANSDERMAL DAILY
Status: DISCONTINUED | OUTPATIENT
Start: 2024-02-29 | End: 2024-03-08

## 2024-02-29 RX ORDER — DIPHENHYDRAMINE HCL 25 MG
25 CAPSULE ORAL PRN
Status: DISCONTINUED | OUTPATIENT
Start: 2024-02-29 | End: 2024-03-13 | Stop reason: HOSPADM

## 2024-02-29 RX ADMIN — BACLOFEN 20 MG: 10 TABLET ORAL at 21:05

## 2024-02-29 RX ADMIN — DULOXETINE HYDROCHLORIDE 60 MG: 30 CAPSULE, DELAYED RELEASE ORAL at 10:04

## 2024-02-29 RX ADMIN — SODIUM CHLORIDE, PRESERVATIVE FREE 10 ML: 5 INJECTION INTRAVENOUS at 12:04

## 2024-02-29 RX ADMIN — CEFTAZIDIME, AVIBACTAM 2.5 G: 2; .5 POWDER, FOR SOLUTION INTRAVENOUS at 00:25

## 2024-02-29 RX ADMIN — SODIUM CHLORIDE: 9 INJECTION, SOLUTION INTRAVENOUS at 13:34

## 2024-02-29 RX ADMIN — CEFTAZIDIME, AVIBACTAM 2.5 G: 2; .5 POWDER, FOR SOLUTION INTRAVENOUS at 13:34

## 2024-02-29 RX ADMIN — OXYCODONE 5 MG: 5 TABLET ORAL at 10:04

## 2024-02-29 RX ADMIN — VANCOMYCIN HYDROCHLORIDE 1750 MG: 1 INJECTION, POWDER, LYOPHILIZED, FOR SOLUTION INTRAVENOUS at 16:24

## 2024-02-29 RX ADMIN — OXYCODONE 5 MG: 5 TABLET ORAL at 06:19

## 2024-02-29 RX ADMIN — HYDROMORPHONE HYDROCHLORIDE 0.5 MG: 1 INJECTION, SOLUTION INTRAMUSCULAR; INTRAVENOUS; SUBCUTANEOUS at 15:06

## 2024-02-29 RX ADMIN — LEVOTHYROXINE SODIUM 100 MCG: 0.07 TABLET ORAL at 06:19

## 2024-02-29 RX ADMIN — OXYCODONE 5 MG: 5 TABLET ORAL at 02:43

## 2024-02-29 RX ADMIN — DIPHENHYDRAMINE HYDROCHLORIDE 25 MG: 25 CAPSULE ORAL at 16:21

## 2024-02-29 RX ADMIN — ENOXAPARIN SODIUM 40 MG: 100 INJECTION SUBCUTANEOUS at 10:05

## 2024-02-29 RX ADMIN — SODIUM CHLORIDE, PRESERVATIVE FREE 10 ML: 5 INJECTION INTRAVENOUS at 21:16

## 2024-02-29 RX ADMIN — OXYCODONE 5 MG: 5 TABLET ORAL at 18:19

## 2024-02-29 RX ADMIN — ALPRAZOLAM 0.5 MG: 0.25 TABLET ORAL at 21:13

## 2024-02-29 RX ADMIN — OXYCODONE 5 MG: 5 TABLET ORAL at 13:34

## 2024-02-29 RX ADMIN — OXCARBAZEPINE 300 MG: 300 TABLET, FILM COATED ORAL at 16:30

## 2024-02-29 RX ADMIN — DULOXETINE HYDROCHLORIDE 60 MG: 30 CAPSULE, DELAYED RELEASE ORAL at 21:05

## 2024-02-29 RX ADMIN — CEFTAZIDIME, AVIBACTAM 2.5 G: 2; .5 POWDER, FOR SOLUTION INTRAVENOUS at 21:06

## 2024-02-29 RX ADMIN — HYDROMORPHONE HYDROCHLORIDE 0.5 MG: 1 INJECTION, SOLUTION INTRAMUSCULAR; INTRAVENOUS; SUBCUTANEOUS at 21:13

## 2024-02-29 RX ADMIN — BACLOFEN 20 MG: 10 TABLET ORAL at 10:05

## 2024-02-29 ASSESSMENT — PAIN DESCRIPTION - ORIENTATION
ORIENTATION: LEFT
ORIENTATION: RIGHT
ORIENTATION: LEFT
ORIENTATION: LEFT

## 2024-02-29 ASSESSMENT — PAIN DESCRIPTION - LOCATION
LOCATION: HIP;ARM
LOCATION: HIP

## 2024-02-29 ASSESSMENT — PAIN SCALES - GENERAL
PAINLEVEL_OUTOF10: 5
PAINLEVEL_OUTOF10: 7
PAINLEVEL_OUTOF10: 9
PAINLEVEL_OUTOF10: 7
PAINLEVEL_OUTOF10: 8
PAINLEVEL_OUTOF10: 8
PAINLEVEL_OUTOF10: 5
PAINLEVEL_OUTOF10: 5
PAINLEVEL_OUTOF10: 8
PAINLEVEL_OUTOF10: 8
PAINLEVEL_OUTOF10: 9
PAINLEVEL_OUTOF10: 5

## 2024-02-29 ASSESSMENT — PAIN - FUNCTIONAL ASSESSMENT: PAIN_FUNCTIONAL_ASSESSMENT: ACTIVITIES ARE NOT PREVENTED

## 2024-02-29 ASSESSMENT — PAIN DESCRIPTION - DESCRIPTORS
DESCRIPTORS: ACHING;DISCOMFORT
DESCRIPTORS: CRAMPING;ACHING
DESCRIPTORS: ACHING;CRAMPING
DESCRIPTORS: CRAMPING;SHARP

## 2024-02-29 ASSESSMENT — PAIN SCALES - WONG BAKER
WONGBAKER_NUMERICALRESPONSE: 2
WONGBAKER_NUMERICALRESPONSE: 2
WONGBAKER_NUMERICALRESPONSE: HURTS A LITTLE BIT
WONGBAKER_NUMERICALRESPONSE: HURTS A LITTLE BIT

## 2024-02-29 NOTE — PLAN OF CARE
Problem: Discharge Planning  Goal: Discharge to home or other facility with appropriate resources  Outcome: Progressing  Flowsheets (Taken 2/28/2024 1920 by Marlene Dillard, RN)  Discharge to home or other facility with appropriate resources: Identify barriers to discharge with patient and caregiver     Problem: Skin/Tissue Integrity  Goal: Absence of new skin breakdown  Description: 1.  Monitor for areas of redness and/or skin breakdown  2.  Assess vascular access sites hourly  3.  Every 4-6 hours minimum:  Change oxygen saturation probe site  4.  Every 4-6 hours:  If on nasal continuous positive airway pressure, respiratory therapy assess nares and determine need for appliance change or resting period.  Outcome: Progressing     Problem: Safety - Adult  Goal: Free from fall injury  Outcome: Progressing     Problem: ABCDS Injury Assessment  Goal: Absence of physical injury  Outcome: Progressing

## 2024-02-29 NOTE — CARE COORDINATION
Received call from Linda with [facility]. She let me know patient is open with them, being seen for skilled nursing/wound care 3 days a week and are available for PT/OT as well. Clinicals sent and updated.

## 2024-02-29 NOTE — PROGRESS NOTES
Hospitalist Progress Note    NAME:   Paula Renteria   : 1970   MRN: [id number]     Date/Time: 2024 12:55 PM  Patient PCP: Lion Liu MD    Estimated discharge date: Greater than 48 hours  Barriers: IV antibiotics, Ortho evaluation and plan    Hospital Course:  Paula Renteria is a 53 y.o.  female with PMHx significant for several lumbar fusion surgeries, most recently a fusion in  that required several washout surgeries and has had an open wound since.  Patient admitted 24  because her orthopedic doctor James recommended her to come to the ER for septic arthritis of left hip. CT shows septic arthritis of the left hip with collapse of the left femoral head.  Diffuse osteosclerosis of the left hemipelvis can be seen with chronic osteomyelitis.  Culture of tissue from 2024 shows moderate Pseudomonas MDRO and moderate bacteroides thetaiotaomicron beta lactamase positive.  Ortho consulted, ID consulted.  ID started patient on Avycaz.    Assessment / Plan:  Left hip pain  Septic arthritis of left hip   -CT shows septic arthritis of the left hip with collapse of the left femoral head.  Diffuse osteosclerosis of the left hemipelvis can be seen with chronic osteomyelitis  -Pain management  - ID consulted, placed on Avycaz  -Ortho consult placed     Chronic osteomyelitis of lumbar spine  -Wound VAC in place  - CT shows loosening of the hardware and L3, L4, L5 and left pelvis.  Large decubitus wound overlying the lumbosacral junction extending to the underlying bone.  - Recent isolation from wound culture shows Pseudomonas aeruginosa  - ID consulted, placed on Avycaz     Microcytic, hypochromic anemia   -Continue to monitor, hemoglobin 8.8    depression/bipolar disorder  Anxiety  - Continue Trileptal, Cymbalta, Xanax    Medical Decision Making:   I personally reviewed labs: CBC, CMP, UA  I personally reviewed imaging: CT abdomen pelvis  Toxic drug monitoring: Lovenox for bleeding, monitor

## 2024-02-29 NOTE — PROGRESS NOTES
Vancomycin Dosing Consult  Paula Renteria is a 53 y.o. female with GPC bacteremia. Pharmacy was consulted by Dr. Garrison to dose and monitor vancomycin. Today is day 1.    Antibiotic regimen: Vancomycin + Avycaz    Temp (24hrs), Av.4 °F (36.9 °C), Min:97.9 °F (36.6 °C), Max:99 °F (37.2 °C)    Recent Labs     24  1447   WBC 6.2   CREATININE 0.40*   BUN 12     Est CrCl: 146 mL/min  Concomitant nephrotoxic drugs: Contrast agents    Cultures:   24: Blood cultures x 2- GPC in 1 out of 2 bottles    MRSA Swab: Not ordered, patient already received first dose of vancomycin    Target range: AUC/ISABELLA 400-600    Recent level history:  Date/Time Dose & Interval Measured Level (mcg/mL) Associated AUC/ISABELLA              Assessment/Plan:   Patient has history of multiple back surgeries with hardware replacement and infection complications, completed a course of meropenem in 2024.   Documented allergy to vancomycin with hives. Per patient, vancomycin is tolerated if pre-medicated with Benadryl.   Loading dose Vancomycin 1750 mg x 1 given today.   Ordered maintenance dose of Vancomycin 1500 mg q 12 hours, predicted .  Level scheduled for 3/1 @1430.  Baseline creatinine unclear, but appears stable. We will monitor and adjust accordingly.   BMP ordered through 3/7  Antimicrobial stop date TBD

## 2024-02-29 NOTE — PROGRESS NOTES
Infectious Disease Progress Note               Subjective:   Pt seen and examined at bedside. Ongoing reports of left hip pain and ambulatory difficulty, no acute events since admission.   Objective:   Physical Exam:     BP (!) 167/81   Pulse 91   Temp 97.9 °F (36.6 °C) (Oral)   Resp 18   Ht 1.575 m (5' 2\")   Wt 70.3 kg (155 lb)   SpO2 97%   BMI 28.35 kg/m²    O2 Device: None (Room air)    Temp (24hrs), Av.2 °F (36.8 °C), Min:97.9 °F (36.6 °C), Max:98.8 °F (37.1 °C)    No intake/output data recorded.    1901 -  0700  In: 250 [P.O.:240; I.V.:10]  Out: 400 [Urine:400]    General: NAD, AAO x 4  HEENT: GIORGI, Moist mucosa   Lungs: CTA b/l, decreased at the bases, no wheeze/rhonchi   Heart: S1S2+, RRR, no murmur  Abdo: Soft, NT, ND, +BS   : No higginbotham cath   Exts: No edema, + pulses b/l   Skin: lower back mid line wound w purulent drainage, no erythema or edema     Data Review:       Recent Days:    Recent Labs     24  1447   WBC 6.2   HGB 8.8*   HCT 30.1*   *     Recent Labs     24  1447   BUN 12   CREATININE 0.40*       Lab Results   Component Value Date/Time    CRP 14.20 2024 02:47 PM     Microbiology     Results       Procedure Component Value Units Date/Time    Culture, Blood, PCR ID Panel [[id number]] Collected: 24 145    Order Status: Canceled Specimen: Blood     Culture, Blood, PCR ID Panel [[id number]] Collected: 24 145    Order Status: No result Specimen: Blood Updated: 24 1034    Blood Culture 1 [[id number]] Collected: 24    Order Status: Completed Specimen: Blood Updated: 24 0722     Special Requests --        No Special Requests  Left       Culture No growth after 16 hours       Blood Culture 2 [[id number]] Collected: 24    Order Status: Completed Specimen: Blood Updated: 24 1115     Special Requests No Special Requests        Culture       one of two bottles has been flagged positive by

## 2024-02-29 NOTE — WOUND CARE
IP WOUND CONSULT    Paula Renteria  MEDICAL RECORD NUMBER:  [id number]  AGE: 53 y.o.   GENDER: female  : 1970  TODAY'S DATE:  2024    GENERAL     [] Follow-up   [x] New Consult    Paula Renteria is a 53 y.o. female referred by:   [x] Physician  [] Nursing  [] Other:         PAST MEDICAL HISTORY    Past Medical History:   Diagnosis Date    Thyroid disease         PAST SURGICAL HISTORY    Past Surgical History:   Procedure Laterality Date    BACK SURGERY      CHOLECYSTECTOMY      COLON SURGERY      JOINT REPLACEMENT      REVISION TOTAL KNEE ARTHROPLASTY Right        FAMILY HISTORY    Family History   Problem Relation Age of Onset    Heart Attack Maternal Grandmother     Stroke Maternal Grandmother     Heart Attack Maternal Grandfather     Stroke Maternal Grandfather     Heart Attack Paternal Grandmother     Stroke Paternal Grandmother     Heart Attack Paternal Grandfather     Stroke Paternal Grandfather          ALLERGIES    Allergies   Allergen Reactions    Latex Hives and Other (See Comments)     blisters    Colloidal Oatmeal Anaphylaxis    Penicillins Rash, Hives and Swelling     blisters    Gabapentin Other (See Comments)     Lost control of self.    Oatmeal Hives    Vancomycin Hives       MEDICATIONS    No current facility-administered medications on file prior to encounter.     Current Outpatient Medications on File Prior to Encounter   Medication Sig Dispense Refill    ondansetron (ZOFRAN) 4 MG tablet Take 1 tablet by mouth every 8 hours as needed for Nausea or Vomiting      lidocaine 4 % external patch Place 1 patch onto the skin daily      trolamine salicylate (ASPERCREME) 10 % cream Apply topically as needed for Pain Apply topically as needed.      albuterol sulfate HFA (PROVENTIL;VENTOLIN;PROAIR) 108 (90 Base) MCG/ACT inhaler INHALE TWO PUFFS BY MOUTH every 4 hours AS NEEDED      XANAX 0.5 MG tablet Take 1 tablet by mouth nightly as needed.      docusate (COLACE, DULCOLAX) 100 MG CAPS Take 100 mg by  PAMATHEW  Recommendations: Recommended and received order to reapply NPWT dressing to lumbar wound on Friday 3/1/24.  For now, pack with Opticell Ag and cover with a 6x6 border foam daily and prn for soiling.      Prevention:  Apply Optifoam Border Sacral foam dressing to sacrum and Border Heel foams to both heels every three days to redistribute pressure from bony prominence and prevent pressure and friction injury to skin.  Rn is to gently peel back foam dressing for shift integumentary assessment and re-secure.  Ensure patient turns / repositions q2h at 30 degree angle to offload sacrum and buttocks to prevent pressure related skin injury.  Float heels with 1-2 pillows lengthwise while in bed for offloading of the heels.  Maintain HOB at 30 degrees or less, if not contraindicated, to reduce pressure to buttocks and sacrum.  Raise foot of bed to help prevent friction and shear injury from sliding down in the bed.  Apply a chair cushion to chair / recliner to reduce pressure to sit bones when sitting.  Will follow up tomorrow for application of NPWT to lumbar wound.    Discharge Wound Care Needs: NPWT to lumbar and follow up with Dr. Ramirez    Teaching completed with:   [] Patient           [] Family member       [] Caregiver          [] Nursing  [] Other    Patient/Caregiver Teaching:  Level of patient/caregiver understanding able to:   [] Indicates understanding       [] Needs reinforcement  [] Unsuccessful      [] Verbal Understanding  [] Demonstrated understanding       [] No evidence of learning  [] Refused teaching         [] N/A       Electronically signed by Paula Velasquez RN on 2/29/2024 at 3:07 PM

## 2024-03-01 LAB
ANION GAP SERPL CALC-SCNC: 2 MMOL/L (ref 5–15)
BACTERIA SPEC CULT: ABNORMAL
BACTERIA SPEC CULT: ABNORMAL
BASOPHILS # BLD: 0 K/UL (ref 0–0.1)
BASOPHILS NFR BLD: 0 % (ref 0–1)
BUN SERPL-MCNC: 8 MG/DL (ref 6–20)
BUN/CREAT SERPL: 17 (ref 12–20)
CA-I BLD-MCNC: 9 MG/DL (ref 8.5–10.1)
CHLORIDE SERPL-SCNC: 108 MMOL/L (ref 97–108)
CO2 SERPL-SCNC: 29 MMOL/L (ref 21–32)
CREAT SERPL-MCNC: 0.46 MG/DL (ref 0.55–1.02)
DIFFERENTIAL METHOD BLD: ABNORMAL
EOSINOPHIL # BLD: 0.1 K/UL (ref 0–0.4)
EOSINOPHIL NFR BLD: 1 % (ref 0–7)
ERYTHROCYTE [DISTWIDTH] IN BLOOD BY AUTOMATED COUNT: 20 % (ref 11.5–14.5)
GLUCOSE SERPL-MCNC: 93 MG/DL (ref 65–100)
HCT VFR BLD AUTO: 30.2 % (ref 35–47)
HGB BLD-MCNC: 8.8 G/DL (ref 11.5–16)
IMM GRANULOCYTES # BLD AUTO: 0 K/UL (ref 0–0.04)
IMM GRANULOCYTES NFR BLD AUTO: 1 % (ref 0–0.5)
LYMPHOCYTES # BLD: 1.3 K/UL (ref 0.8–3.5)
LYMPHOCYTES NFR BLD: 26 % (ref 12–49)
Lab: ABNORMAL
MCH RBC QN AUTO: 22.1 PG (ref 26–34)
MCHC RBC AUTO-ENTMCNC: 29.1 G/DL (ref 30–36.5)
MCV RBC AUTO: 75.9 FL (ref 80–99)
MONOCYTES # BLD: 0.7 K/UL (ref 0–1)
MONOCYTES NFR BLD: 14 % (ref 5–13)
NEUTS SEG # BLD: 2.8 K/UL (ref 1.8–8)
NEUTS SEG NFR BLD: 58 % (ref 32–75)
NRBC # BLD: 0 K/UL (ref 0–0.01)
NRBC BLD-RTO: 0 PER 100 WBC
PLATELET # BLD AUTO: 381 K/UL (ref 150–400)
PMV BLD AUTO: 9.1 FL (ref 8.9–12.9)
POTASSIUM SERPL-SCNC: 3.4 MMOL/L (ref 3.5–5.1)
RBC # BLD AUTO: 3.98 M/UL (ref 3.8–5.2)
SODIUM SERPL-SCNC: 139 MMOL/L (ref 136–145)
VANCOMYCIN SERPL-MCNC: 18.4 UG/ML
WBC # BLD AUTO: 4.9 K/UL (ref 3.6–11)

## 2024-03-01 PROCEDURE — 2580000003 HC RX 258: Performed by: INTERNAL MEDICINE

## 2024-03-01 PROCEDURE — 6370000000 HC RX 637 (ALT 250 FOR IP): Performed by: INTERNAL MEDICINE

## 2024-03-01 PROCEDURE — 1100000000 HC RM PRIVATE

## 2024-03-01 PROCEDURE — 80202 ASSAY OF VANCOMYCIN: CPT

## 2024-03-01 PROCEDURE — 2500000003 HC RX 250 WO HCPCS

## 2024-03-01 PROCEDURE — 80048 BASIC METABOLIC PNL TOTAL CA: CPT

## 2024-03-01 PROCEDURE — 87040 BLOOD CULTURE FOR BACTERIA: CPT

## 2024-03-01 PROCEDURE — 97605 NEG PRS WND THER DME<=50SQCM: CPT

## 2024-03-01 PROCEDURE — 6360000002 HC RX W HCPCS

## 2024-03-01 PROCEDURE — 6370000000 HC RX 637 (ALT 250 FOR IP)

## 2024-03-01 PROCEDURE — 2580000003 HC RX 258

## 2024-03-01 PROCEDURE — 6360000002 HC RX W HCPCS: Performed by: INTERNAL MEDICINE

## 2024-03-01 PROCEDURE — 99232 SBSQ HOSP IP/OBS MODERATE 35: CPT | Performed by: INTERNAL MEDICINE

## 2024-03-01 PROCEDURE — 36415 COLL VENOUS BLD VENIPUNCTURE: CPT

## 2024-03-01 PROCEDURE — 85025 COMPLETE CBC W/AUTO DIFF WBC: CPT

## 2024-03-01 RX ORDER — HYDRALAZINE HYDROCHLORIDE 20 MG/ML
10 INJECTION INTRAMUSCULAR; INTRAVENOUS EVERY 6 HOURS PRN
Status: DISCONTINUED | OUTPATIENT
Start: 2024-03-01 | End: 2024-03-13 | Stop reason: HOSPADM

## 2024-03-01 RX ORDER — METRONIDAZOLE 500 MG/100ML
500 INJECTION, SOLUTION INTRAVENOUS EVERY 8 HOURS
Status: DISCONTINUED | OUTPATIENT
Start: 2024-03-01 | End: 2024-03-10

## 2024-03-01 RX ADMIN — OXCARBAZEPINE 300 MG: 300 TABLET, FILM COATED ORAL at 09:32

## 2024-03-01 RX ADMIN — HYDROMORPHONE HYDROCHLORIDE 0.5 MG: 1 INJECTION, SOLUTION INTRAMUSCULAR; INTRAVENOUS; SUBCUTANEOUS at 09:33

## 2024-03-01 RX ADMIN — DIPHENHYDRAMINE HYDROCHLORIDE 25 MG: 25 CAPSULE ORAL at 17:03

## 2024-03-01 RX ADMIN — BACLOFEN 20 MG: 10 TABLET ORAL at 09:32

## 2024-03-01 RX ADMIN — CEFTAZIDIME, AVIBACTAM 2.5 G: 2; .5 POWDER, FOR SOLUTION INTRAVENOUS at 04:21

## 2024-03-01 RX ADMIN — VANCOMYCIN HYDROCHLORIDE 1500 MG: 750 INJECTION, POWDER, LYOPHILIZED, FOR SOLUTION INTRAVENOUS at 16:57

## 2024-03-01 RX ADMIN — DOCUSATE SODIUM 100 MG: 100 CAPSULE, LIQUID FILLED ORAL at 17:03

## 2024-03-01 RX ADMIN — OXCARBAZEPINE 300 MG: 300 TABLET, FILM COATED ORAL at 18:06

## 2024-03-01 RX ADMIN — OXYCODONE 5 MG: 5 TABLET ORAL at 21:08

## 2024-03-01 RX ADMIN — HYDROMORPHONE HYDROCHLORIDE 0.5 MG: 1 INJECTION, SOLUTION INTRAMUSCULAR; INTRAVENOUS; SUBCUTANEOUS at 23:05

## 2024-03-01 RX ADMIN — VANCOMYCIN HYDROCHLORIDE 1500 MG: 750 INJECTION, POWDER, LYOPHILIZED, FOR SOLUTION INTRAVENOUS at 04:28

## 2024-03-01 RX ADMIN — SODIUM CHLORIDE, PRESERVATIVE FREE 10 ML: 5 INJECTION INTRAVENOUS at 23:05

## 2024-03-01 RX ADMIN — OXYCODONE 5 MG: 5 TABLET ORAL at 06:47

## 2024-03-01 RX ADMIN — METRONIDAZOLE 500 MG: 500 INJECTION, SOLUTION INTRAVENOUS at 19:05

## 2024-03-01 RX ADMIN — HYDRALAZINE HYDROCHLORIDE 10 MG: 20 INJECTION INTRAMUSCULAR; INTRAVENOUS at 18:34

## 2024-03-01 RX ADMIN — HYDROMORPHONE HYDROCHLORIDE 0.5 MG: 1 INJECTION, SOLUTION INTRAMUSCULAR; INTRAVENOUS; SUBCUTANEOUS at 15:04

## 2024-03-01 RX ADMIN — OXCARBAZEPINE 300 MG: 300 TABLET, FILM COATED ORAL at 09:34

## 2024-03-01 RX ADMIN — SODIUM CHLORIDE, PRESERVATIVE FREE 10 ML: 5 INJECTION INTRAVENOUS at 09:33

## 2024-03-01 RX ADMIN — ALPRAZOLAM 0.5 MG: 0.25 TABLET ORAL at 23:07

## 2024-03-01 RX ADMIN — DIPHENHYDRAMINE HYDROCHLORIDE 25 MG: 25 CAPSULE ORAL at 04:28

## 2024-03-01 RX ADMIN — LEVOTHYROXINE SODIUM 100 MCG: 0.07 TABLET ORAL at 06:47

## 2024-03-01 RX ADMIN — HYDROMORPHONE HYDROCHLORIDE 0.5 MG: 1 INJECTION, SOLUTION INTRAMUSCULAR; INTRAVENOUS; SUBCUTANEOUS at 02:12

## 2024-03-01 RX ADMIN — BACLOFEN 20 MG: 10 TABLET ORAL at 21:08

## 2024-03-01 RX ADMIN — DULOXETINE HYDROCHLORIDE 60 MG: 30 CAPSULE, DELAYED RELEASE ORAL at 21:08

## 2024-03-01 RX ADMIN — HYDROMORPHONE HYDROCHLORIDE 0.5 MG: 1 INJECTION, SOLUTION INTRAMUSCULAR; INTRAVENOUS; SUBCUTANEOUS at 19:04

## 2024-03-01 RX ADMIN — CEFTAZIDIME, AVIBACTAM 2.5 G: 2; .5 POWDER, FOR SOLUTION INTRAVENOUS at 21:46

## 2024-03-01 RX ADMIN — ENOXAPARIN SODIUM 40 MG: 100 INJECTION SUBCUTANEOUS at 09:33

## 2024-03-01 RX ADMIN — ACETAMINOPHEN 650 MG: 325 TABLET ORAL at 23:49

## 2024-03-01 RX ADMIN — OXCARBAZEPINE 300 MG: 300 TABLET, FILM COATED ORAL at 12:27

## 2024-03-01 RX ADMIN — DULOXETINE HYDROCHLORIDE 60 MG: 30 CAPSULE, DELAYED RELEASE ORAL at 09:32

## 2024-03-01 RX ADMIN — CEFTAZIDIME, AVIBACTAM 2.5 G: 2; .5 POWDER, FOR SOLUTION INTRAVENOUS at 12:27

## 2024-03-01 RX ADMIN — ACETAMINOPHEN 650 MG: 325 TABLET ORAL at 18:09

## 2024-03-01 RX ADMIN — SODIUM CHLORIDE, PRESERVATIVE FREE 10 ML: 5 INJECTION INTRAVENOUS at 21:47

## 2024-03-01 RX ADMIN — OXYCODONE 5 MG: 5 TABLET ORAL at 16:57

## 2024-03-01 RX ADMIN — OXYCODONE 5 MG: 5 TABLET ORAL at 12:27

## 2024-03-01 ASSESSMENT — PAIN SCALES - GENERAL
PAINLEVEL_OUTOF10: 7
PAINLEVEL_OUTOF10: 3
PAINLEVEL_OUTOF10: 8
PAINLEVEL_OUTOF10: 6
PAINLEVEL_OUTOF10: 6
PAINLEVEL_OUTOF10: 7
PAINLEVEL_OUTOF10: 4
PAINLEVEL_OUTOF10: 5
PAINLEVEL_OUTOF10: 7
PAINLEVEL_OUTOF10: 8
PAINLEVEL_OUTOF10: 5
PAINLEVEL_OUTOF10: 8
PAINLEVEL_OUTOF10: 7
PAINLEVEL_OUTOF10: 6
PAINLEVEL_OUTOF10: 5

## 2024-03-01 ASSESSMENT — PAIN DESCRIPTION - LOCATION
LOCATION: BACK
LOCATION: HIP
LOCATION: BACK
LOCATION: HIP
LOCATION: LEG;HIP

## 2024-03-01 ASSESSMENT — PAIN DESCRIPTION - DESCRIPTORS
DESCRIPTORS: ACHING;CRAMPING;DISCOMFORT;THROBBING
DESCRIPTORS: ACHING
DESCRIPTORS: ACHING;CRAMPING;THROBBING
DESCRIPTORS: ACHING;JABBING

## 2024-03-01 ASSESSMENT — PAIN SCALES - WONG BAKER
WONGBAKER_NUMERICALRESPONSE: HURTS A LITTLE BIT
WONGBAKER_NUMERICALRESPONSE: 2
WONGBAKER_NUMERICALRESPONSE: 0
WONGBAKER_NUMERICALRESPONSE: NO HURT

## 2024-03-01 ASSESSMENT — PAIN - FUNCTIONAL ASSESSMENT
PAIN_FUNCTIONAL_ASSESSMENT: ACTIVITIES ARE NOT PREVENTED
PAIN_FUNCTIONAL_ASSESSMENT: PREVENTS OR INTERFERES SOME ACTIVE ACTIVITIES AND ADLS
PAIN_FUNCTIONAL_ASSESSMENT: PREVENTS OR INTERFERES SOME ACTIVE ACTIVITIES AND ADLS
PAIN_FUNCTIONAL_ASSESSMENT: ACTIVITIES ARE NOT PREVENTED
PAIN_FUNCTIONAL_ASSESSMENT: PREVENTS OR INTERFERES SOME ACTIVE ACTIVITIES AND ADLS

## 2024-03-01 ASSESSMENT — PAIN DESCRIPTION - ORIENTATION
ORIENTATION: LEFT
ORIENTATION: LEFT
ORIENTATION: LEFT;LOWER
ORIENTATION: LEFT;LOWER
ORIENTATION: RIGHT;LOWER;UPPER

## 2024-03-01 NOTE — WOUND CARE
Negative Pressure    NAME:  Paula Renteria  YOB: 1970  MEDICAL RECORD NUMBER:  [id number]  DATE:  2/28/2024    Applied Negative Pressure to non-healing surgical incision to lumbar.  [x] Applied skin barrier prep to janusz-wound.   [x] Cut strips of plastic drape to picture frame wound so that janusz-wound is     covered with the drape.   [] If bridging dressing to less prominent site, cover any intact skin that will come in contact with the Negative Pressure Therapy sponge, gauze or channel drain with plastic drape.  The sponge should never touch intact skin.   [x] Cut sponge, gauze or channel drain to size which will fit into the wound/ulcer bed without being forced.   [x] Be sure the sponge is large enough to hold the entire round plastic flange which is attached to the tubing.  Never allow flange to be larger than the sponge or it will produce suction damaging intact skin.  Total number of individual pieces of foam used within the wound bed: 1 x white foam and 1 x black foam  [] If bridging the dressing away from the primary site, be sure the bridge leads to a piece of sponge large enough to hold the entire flange without allowing any of the flange to overlap onto intact skin.   [x] Covered sponge, gauze or channel drain with plastic drape.   [x] Cut a hole in this plastic drape directly over the sponge the same size as the plastic drain tubing.   [x] Removed plastic liner from flange and apply it directly over the hole you cut.   [] Removed the plastic cover from the flange.   [x] Attached the tubing to the wound/ulcer Negative Pressure Therapy and turn it on to be sure a vacuum is created and that there are no leaks.   [] If air leaks occur, use plastic drape to patch them.   [] Secured Negative Pressure Therapy dressing with ace wrap loosely if located on an extremity. Maintain tubing outside of ace wrap. Tubing must not exert pressure on intact skin.    Applied per  Guidelines  Patient  tolerated moderately well with IV dilaudid 0.5 mg provided by Jackelin Hunter prior to dressing change.  Placed white foam over exposed bone and hardware and packed wound with black foam.  Discussed bridging the track pad to left side but patient prefers track pad over wound dressing.  May apply 6x6 border foams to both lateral sides of periwound to protect skin from pressure related injury due to track pad tubing.  Informed Jackelin Hunter.  Next dressing change while in-patient will be for Tuesday 3/5/24 by WCN.  For discharge, remove NPWT dressing and apply a wet / dry.  Home wound vac may be reapplied by TANISHA Rn on first home visit.  Home vac kit is in patient's room.       If wound vac is not working properly: 1) Check to see if track pad tubing is clogged.  If so, remove track pad, apply film over exposed foam, cut dime size hole in film, and apply a new track pad.  2) If film is leaking, find area with leak and reinforce with film.   3) If leak cannot be fixed, remove dressing, apply wet/dry dressing, and inform the WCN.          Media Information      Document Information    Wound Care Image: Wound   Mid Lumbar   02/29/2024 14:59   Attached To:   Hospital Encounter on 2/28/24   Source Information    Paula Velasquez RN  [facility]

## 2024-03-01 NOTE — PROGRESS NOTES
Requests        Culture       Staphylococcus species, coagulase negative growing in 1 of 2 bottles drawn No Site Indicated            (NOTE) [facility] CL CLT DEBORAH BLOCKER RN [id number] 2/29/24 LITA             Diagnostic   CT PELVIS W WO CONTRAST Additional Contrast? None    Result Date: 2/29/2024  1. Findings compatible with chronic osteomyelitis and septic arthritis of the left hip with collapse of the left femoral head. Diffuse osteosclerosis of the left hemipelvis can be seen with chronic osteomyelitis 2. Loosening of the hardware in the L3 L4 L5 and left pelvis 3. Large decubitus wound overlying the lumbosacral junction extending to the underlying bone. A drainable fluid collection is not demonstrated        Assessment/Plan     Septic arthritis/Chronic osteomyelitis involving left hip, admitted for debridement         Chronic osteomyelitis and septic arthritis of the left hip with collapse of the left femoral head on CT (02/29)        Remains afebrile w a normal WBC on routine labs        Scheduled for left hip debridement w placement of antibiotics pacer on 03/04 by Dr Ramirez         Continue on Vanc and Avycaz for now, will adjust coverage per intra-op Cx, may be neg from antibiotic exposure         Routine labs in the morning, including CRP and ESR. Will be needing long term IV antibiotics     2. Staph species bacteremia, isolated from 1/2, ?skin contaminant, underlying hardware in spine       Repeat blood Cx from today is pending       Continue on Vanc, tolerating, will follow repeat blood Cx    3. Chronic lower back infection, underlying hardware w draining fistula      Repeated isolation of P.aeruginosa from wound Cx, most recently on 02/12, Bacteroides species also isolated       Contacted microbiology lab and they are unable to perform susceptibility tested on wound Cx from 02/12      Will continue on Avycaz, added Metronidazole for anaerobic coverage      4  Left hip pain w ambulatory difficulty: Continue  symptomatic pain mgt       Promise Garrison MD    3/1/2024

## 2024-03-01 NOTE — PROGRESS NOTES
Hospitalist Progress Note    NAME:   Paula Renteria   : 1970   MRN: [id number]     Date/Time: 3/1/2024 11:30 AM  Patient PCP: Lion Liu MD    Estimated discharge date: Greater than 48 hours  Barriers: IV antibiotics, Ortho evaluation and plan    Hospital Course:  Paula Renteria is a 53 y.o.  female with PMHx significant for several lumbar fusion surgeries, most recently a fusion in  that required several washout surgeries and has had an open wound since.  Patient admitted 24  because her orthopedic doctor James recommended her to come to the ER for septic arthritis of left hip. CT shows septic arthritis of the left hip with collapse of the left femoral head.  Diffuse osteosclerosis of the left hemipelvis can be seen with chronic osteomyelitis.  Culture of tissue from 2024 shows moderate Pseudomonas MDRO and moderate bacteroides thetaiotaomicron beta lactamase positive.   ID consulted.  ID started patient on Avycaz. Ortho consulted, will perform left hip Girdlestone procedure 3/4/2024.  Gaffney perioperative risk shows 0.5% risk of MI or cardiac arrest. [employer] Risk calculator shows an average risk of serious complications including surgical site infections, UTI, pneumonia.  Below average chance of VTE, renal failure, death.  Patient medically cleared for procedure.      Assessment / Plan:  Left hip pain  Septic arthritis of left hip   -CT shows septic arthritis of the left hip with collapse of the left femoral head.  Diffuse osteosclerosis of the left hemipelvis can be seen with chronic osteomyelitis  -Pain management  - ID consulted, placed on Avycaz  -Ortho evaluated, will perform  Left hip Girdlestone procedure 3/4/2024.     Chronic osteomyelitis of lumbar spine  -Wound VAC in place  - CT shows loosening of the hardware and L3, L4, L5 and left pelvis.  Large decubitus wound overlying the lumbosacral junction extending to the underlying bone.  - Recent isolation  discussed; medications were reviewed and discharge planning was addressed.     ________________________________________________________________________  Total NON critical care TIME:  35  Minutes    Total CRITICAL CARE TIME Spent:   Minutes non procedure based      Comments   >50% of visit spent in counseling and coordination of care     ________________________________________________________________________  Merna Bateman PA-C     Procedures: see electronic medical records for all procedures/Xrays and details which were not copied into this note but were reviewed prior to creation of Plan.      LABS:  I reviewed today's most current labs and imaging studies.  Pertinent labs include:  Recent Labs     02/28/24 1447 03/01/24  0748   WBC 6.2 4.9   HGB 8.8* 8.8*   HCT 30.1* 30.2*   * 381       Recent Labs     02/28/24  1447 03/01/24  0748    139   K 3.5 3.4*    108   CO2 29 29   BUN 12 8   ALT 32  --          Signed: Merna Bateman PA-C

## 2024-03-01 NOTE — CONSULTS
Department of Orthopedic Surgery  Attending Consult Note        Reason for Consult:  Septic arthritis of the left hip  Requesting Physician:  Ms. Fransico PA-C    CHIEF COMPLAINT:  Chronic left hip pain x 6 months    History Obtained From:  patient    HISTORY OF PRESENT ILLNESS:         Paula Renteria is a 53 y.o. female who presented at the [facility] orthopedics office approximately 4 weeks ago, for evaluation of left hip pain.     The patient presented with history of progressive pain in her left hip, buttock and groin region, radiating to her thigh for the last 6 months or more.  The pain has become progressively worse in the last few weeks, and was rated at 8/10, sharp and constant ache.    Lab work was ordered, which confirmed highly elevated inflammatory markers and evidence of underlying sepsis.  The patient was subsequently seen by Dr. Garrison at [facility] Department of infectious diseases, who concurred with the presence of chronic infection of the spinal hardware, as well as likely septic arthritis of the left hip joint.  The patient has been admitted on the medical service, for multidisciplinary comprehensive medical evaluation, risk assessment and clearance, and preparation for surgical debridement and resection arthroplasty of the infected left hip joint.     The patient has a long and complex history of multiple surgeries on her spine.  She underwent a lumbar spine fusion almost 12 years ago which was followed by a postoperative wound infection.  She had a prolonged treatment of this problem, including surgeries at [facility].  She finally came under the treatment of Dr. Newsome who was with [facility] at that time.  Her infection reportedly became controlled.  She apparently had removal of all hardware in 2019.  Due to progressive symptoms and deformity, she subsequently underwent revision thoracolumbosacral iliac posterior fusion with instrumentation by Dr. Pulido in 2022.  She again developed postoperative  chronic osteomyelitis and septic arthritis of the  left hip with collapse of the left femoral head. Diffuse osteosclerosis of the  left hemipelvis can be seen with chronic osteomyelitis  2. Loosening of the hardware in the L3 L4 L5 and left pelvis  3. Large decubitus wound overlying the lumbosacral junction extending to the  underlying bone. A drainable fluid collection is not demonstrated    IMPRESSION/RECOMMENDATIONS:    Clinical and imaging findings were discussed with the patient.  Differential diagnosis was discussed. I had a detailed discussion with the patient about the probable diagnosis, its natural history and treatment options. All the conservative and invasive options were discussed, with their advantages and drawbacks.   The patient is likely suffering from local or hematogenous spread of infection to her left hip joint, with chronic pyogenic septic arthritis.  She also has chronic ongoing persistent infection at the site of her multiply operated spine, with a chronic draining sinus, with probable chronic infection of retained hardware.  In view of the complexity of the problem and the persistence of infection, the patient's prognosis is guarded.  I have recommended and offered surgical debridement of the left hip joint, with a Girdlestone type of resection arthroplasty and placement of antibiotic impregnated antibiotic beads to function as an on articulating spacer.  The option of conversion to a Prostalac type of articulating prosthesis may be considered in the future, depending on the patient's general condition and deliberation on the risk of infection of implanted hardware.  The patient appeared to understand the issues discussed above, and was agreeable with the treatment plan.    Surgery is tentatively scheduled on 3/4/24, pending medical evaluation and clearance.

## 2024-03-01 NOTE — CARE COORDINATION
CM reviewed chart. Per IDR, plans for surgery on Monday. Current discharge plan is home with family, [facility] and [facility]. Updated clinicals have been uploaded.

## 2024-03-01 NOTE — PROGRESS NOTES
Vancomycin Dosing Consult  Paula Renteria is a 53 y.o. female with GPC bacteremia. Pharmacy was consulted by Dr. Garrison to dose and monitor vancomycin. Today is day 2.    Antibiotic regimen: Vancomycin + Avycaz    Temp (24hrs), Av.6 °F (37 °C), Min:98.2 °F (36.8 °C), Max:99.3 °F (37.4 °C)    Recent Labs     24  1447 24  0748   WBC 6.2 4.9   CREATININE 0.40* 0.46*   BUN 12 8     Est CrCl: > 120 mL/min  Concomitant nephrotoxic drugs: Contrast agents    Cultures:   24: Blood cultures x 2: CoNS in 1 of 4 (< 24 hrs)  3/1 Blood: Pending    MRSA Swab: Not ordered, patient already received first dose of vancomycin    Target range: AUC/ISABELLA 400-600    Recent level history:  Date/Time Dose & Interval Measured Level (mcg/mL) Associated AUC/ISABELLA   3/1 @ 1413 1750mg x 1, then 1500mg q12h 18.4 727        Assessment/Plan:   Patient has history of multiple back surgeries with hardware replacement and infection complications, completed a course of meropenem in 2024.   Documented allergy to vancomycin with hives. Per patient, vancomycin has tolerated if pre-medicated with Benadryl.   Renal function normal and stable  Level resulted earlier today at 18.4 (predicted AUC > 600)  Adjust vancomycin dosing to 1000mg q12h (predicted AUC of 486)  Next level scheduled for 3/4 @ 0600  BMP ordered through 3/7  Antimicrobial stop date TBD

## 2024-03-02 LAB
ANION GAP SERPL CALC-SCNC: 2 MMOL/L (ref 5–15)
BASOPHILS # BLD: 0 K/UL (ref 0–0.1)
BASOPHILS NFR BLD: 0 % (ref 0–1)
BUN SERPL-MCNC: 11 MG/DL (ref 6–20)
BUN/CREAT SERPL: 23 (ref 12–20)
CA-I BLD-MCNC: 9 MG/DL (ref 8.5–10.1)
CHLORIDE SERPL-SCNC: 107 MMOL/L (ref 97–108)
CO2 SERPL-SCNC: 29 MMOL/L (ref 21–32)
CREAT SERPL-MCNC: 0.47 MG/DL (ref 0.55–1.02)
CRP SERPL-MCNC: 9.71 MG/DL (ref 0–0.3)
DIFFERENTIAL METHOD BLD: ABNORMAL
EOSINOPHIL # BLD: 0.1 K/UL (ref 0–0.4)
EOSINOPHIL NFR BLD: 3 % (ref 0–7)
ERYTHROCYTE [DISTWIDTH] IN BLOOD BY AUTOMATED COUNT: 19.9 % (ref 11.5–14.5)
ERYTHROCYTE [SEDIMENTATION RATE] IN BLOOD: 84 MM/HR (ref 0–30)
GLUCOSE SERPL-MCNC: 106 MG/DL (ref 65–100)
HCT VFR BLD AUTO: 27.9 % (ref 35–47)
HGB BLD-MCNC: 8.4 G/DL (ref 11.5–16)
IMM GRANULOCYTES # BLD AUTO: 0 K/UL (ref 0–0.04)
IMM GRANULOCYTES NFR BLD AUTO: 1 % (ref 0–0.5)
LYMPHOCYTES # BLD: 1.1 K/UL (ref 0.8–3.5)
LYMPHOCYTES NFR BLD: 22 % (ref 12–49)
MCH RBC QN AUTO: 22.2 PG (ref 26–34)
MCHC RBC AUTO-ENTMCNC: 30.1 G/DL (ref 30–36.5)
MCV RBC AUTO: 73.8 FL (ref 80–99)
MONOCYTES # BLD: 0.5 K/UL (ref 0–1)
MONOCYTES NFR BLD: 9 % (ref 5–13)
NEUTS SEG # BLD: 3.3 K/UL (ref 1.8–8)
NEUTS SEG NFR BLD: 65 % (ref 32–75)
NRBC # BLD: 0 K/UL (ref 0–0.01)
NRBC BLD-RTO: 0 PER 100 WBC
PLATELET # BLD AUTO: 371 K/UL (ref 150–400)
PMV BLD AUTO: 9 FL (ref 8.9–12.9)
POTASSIUM SERPL-SCNC: 3.7 MMOL/L (ref 3.5–5.1)
RBC # BLD AUTO: 3.78 M/UL (ref 3.8–5.2)
SODIUM SERPL-SCNC: 138 MMOL/L (ref 136–145)
WBC # BLD AUTO: 5.1 K/UL (ref 3.6–11)

## 2024-03-02 PROCEDURE — 2500000003 HC RX 250 WO HCPCS

## 2024-03-02 PROCEDURE — 1100000000 HC RM PRIVATE

## 2024-03-02 PROCEDURE — 6360000002 HC RX W HCPCS

## 2024-03-02 PROCEDURE — 36415 COLL VENOUS BLD VENIPUNCTURE: CPT

## 2024-03-02 PROCEDURE — 80048 BASIC METABOLIC PNL TOTAL CA: CPT

## 2024-03-02 PROCEDURE — 6370000000 HC RX 637 (ALT 250 FOR IP): Performed by: INTERNAL MEDICINE

## 2024-03-02 PROCEDURE — 85025 COMPLETE CBC W/AUTO DIFF WBC: CPT

## 2024-03-02 PROCEDURE — 6360000002 HC RX W HCPCS: Performed by: INTERNAL MEDICINE

## 2024-03-02 PROCEDURE — 2580000003 HC RX 258: Performed by: INTERNAL MEDICINE

## 2024-03-02 PROCEDURE — 2580000003 HC RX 258

## 2024-03-02 PROCEDURE — 94761 N-INVAS EAR/PLS OXIMETRY MLT: CPT

## 2024-03-02 PROCEDURE — 86140 C-REACTIVE PROTEIN: CPT

## 2024-03-02 PROCEDURE — 6370000000 HC RX 637 (ALT 250 FOR IP)

## 2024-03-02 PROCEDURE — 85652 RBC SED RATE AUTOMATED: CPT

## 2024-03-02 RX ADMIN — HYDROMORPHONE HYDROCHLORIDE 0.5 MG: 1 INJECTION, SOLUTION INTRAMUSCULAR; INTRAVENOUS; SUBCUTANEOUS at 18:46

## 2024-03-02 RX ADMIN — METRONIDAZOLE 500 MG: 500 INJECTION, SOLUTION INTRAVENOUS at 08:15

## 2024-03-02 RX ADMIN — CEFTAZIDIME, AVIBACTAM 2.5 G: 2; .5 POWDER, FOR SOLUTION INTRAVENOUS at 14:50

## 2024-03-02 RX ADMIN — OXYCODONE 5 MG: 5 TABLET ORAL at 11:39

## 2024-03-02 RX ADMIN — OXYCODONE 5 MG: 5 TABLET ORAL at 05:59

## 2024-03-02 RX ADMIN — HYDROMORPHONE HYDROCHLORIDE 0.5 MG: 1 INJECTION, SOLUTION INTRAMUSCULAR; INTRAVENOUS; SUBCUTANEOUS at 08:11

## 2024-03-02 RX ADMIN — CEFTAZIDIME, AVIBACTAM 2.5 G: 2; .5 POWDER, FOR SOLUTION INTRAVENOUS at 06:00

## 2024-03-02 RX ADMIN — METRONIDAZOLE 500 MG: 500 INJECTION, SOLUTION INTRAVENOUS at 02:15

## 2024-03-02 RX ADMIN — METRONIDAZOLE 500 MG: 500 INJECTION, SOLUTION INTRAVENOUS at 17:29

## 2024-03-02 RX ADMIN — HYDROMORPHONE HYDROCHLORIDE 0.5 MG: 1 INJECTION, SOLUTION INTRAMUSCULAR; INTRAVENOUS; SUBCUTANEOUS at 03:33

## 2024-03-02 RX ADMIN — LEVOTHYROXINE SODIUM 100 MCG: 0.07 TABLET ORAL at 05:59

## 2024-03-02 RX ADMIN — OXCARBAZEPINE 300 MG: 300 TABLET, FILM COATED ORAL at 11:39

## 2024-03-02 RX ADMIN — ACETAMINOPHEN 650 MG: 325 TABLET ORAL at 17:29

## 2024-03-02 RX ADMIN — HYDRALAZINE HYDROCHLORIDE 10 MG: 20 INJECTION INTRAMUSCULAR; INTRAVENOUS at 08:33

## 2024-03-02 RX ADMIN — VANCOMYCIN HYDROCHLORIDE 1000 MG: 1 INJECTION, POWDER, LYOPHILIZED, FOR SOLUTION INTRAVENOUS at 21:57

## 2024-03-02 RX ADMIN — BACLOFEN 20 MG: 10 TABLET ORAL at 08:15

## 2024-03-02 RX ADMIN — SODIUM CHLORIDE, PRESERVATIVE FREE 10 ML: 5 INJECTION INTRAVENOUS at 22:16

## 2024-03-02 RX ADMIN — CEFTAZIDIME, AVIBACTAM 2.5 G: 2; .5 POWDER, FOR SOLUTION INTRAVENOUS at 22:12

## 2024-03-02 RX ADMIN — BACLOFEN 20 MG: 10 TABLET ORAL at 21:58

## 2024-03-02 RX ADMIN — VANCOMYCIN HYDROCHLORIDE 1000 MG: 1 INJECTION, POWDER, LYOPHILIZED, FOR SOLUTION INTRAVENOUS at 10:08

## 2024-03-02 RX ADMIN — SODIUM CHLORIDE, PRESERVATIVE FREE 10 ML: 5 INJECTION INTRAVENOUS at 08:17

## 2024-03-02 RX ADMIN — OXCARBAZEPINE 300 MG: 300 TABLET, FILM COATED ORAL at 08:14

## 2024-03-02 RX ADMIN — DULOXETINE HYDROCHLORIDE 60 MG: 30 CAPSULE, DELAYED RELEASE ORAL at 08:15

## 2024-03-02 RX ADMIN — DIPHENHYDRAMINE HYDROCHLORIDE 25 MG: 25 CAPSULE ORAL at 05:59

## 2024-03-02 RX ADMIN — DULOXETINE HYDROCHLORIDE 60 MG: 30 CAPSULE, DELAYED RELEASE ORAL at 21:58

## 2024-03-02 RX ADMIN — ENOXAPARIN SODIUM 40 MG: 100 INJECTION SUBCUTANEOUS at 08:15

## 2024-03-02 RX ADMIN — HYDROMORPHONE HYDROCHLORIDE 0.5 MG: 1 INJECTION, SOLUTION INTRAMUSCULAR; INTRAVENOUS; SUBCUTANEOUS at 23:29

## 2024-03-02 RX ADMIN — OXCARBAZEPINE 300 MG: 300 TABLET, FILM COATED ORAL at 17:28

## 2024-03-02 RX ADMIN — HYDROMORPHONE HYDROCHLORIDE 0.5 MG: 1 INJECTION, SOLUTION INTRAMUSCULAR; INTRAVENOUS; SUBCUTANEOUS at 14:44

## 2024-03-02 RX ADMIN — OXYCODONE 5 MG: 5 TABLET ORAL at 21:58

## 2024-03-02 ASSESSMENT — PAIN DESCRIPTION - ORIENTATION
ORIENTATION: LEFT
ORIENTATION: RIGHT
ORIENTATION: LEFT;RIGHT
ORIENTATION: LEFT
ORIENTATION: LEFT
ORIENTATION: RIGHT;MID;UPPER

## 2024-03-02 ASSESSMENT — PAIN SCALES - GENERAL
PAINLEVEL_OUTOF10: 8
PAINLEVEL_OUTOF10: 7
PAINLEVEL_OUTOF10: 3
PAINLEVEL_OUTOF10: 8
PAINLEVEL_OUTOF10: 4
PAINLEVEL_OUTOF10: 6
PAINLEVEL_OUTOF10: 6
PAINLEVEL_OUTOF10: 3
PAINLEVEL_OUTOF10: 8
PAINLEVEL_OUTOF10: 4
PAINLEVEL_OUTOF10: 8
PAINLEVEL_OUTOF10: 7
PAINLEVEL_OUTOF10: 8
PAINLEVEL_OUTOF10: 8

## 2024-03-02 ASSESSMENT — PAIN DESCRIPTION - LOCATION
LOCATION: HIP
LOCATION: ABDOMEN;ANKLE;BACK
LOCATION: ABDOMEN;ANKLE
LOCATION: HIP

## 2024-03-02 ASSESSMENT — PAIN DESCRIPTION - DESCRIPTORS
DESCRIPTORS: CRAMPING;DULL
DESCRIPTORS: ACHING;THROBBING
DESCRIPTORS: BURNING;CRAMPING;DISCOMFORT
DESCRIPTORS: ACHING;CRAMPING
DESCRIPTORS: ACHING
DESCRIPTORS: ACHING

## 2024-03-02 ASSESSMENT — PAIN SCALES - WONG BAKER
WONGBAKER_NUMERICALRESPONSE: 0
WONGBAKER_NUMERICALRESPONSE: NO HURT
WONGBAKER_NUMERICALRESPONSE: 0
WONGBAKER_NUMERICALRESPONSE: NO HURT
WONGBAKER_NUMERICALRESPONSE: 0
WONGBAKER_NUMERICALRESPONSE: NO HURT

## 2024-03-02 NOTE — PROGRESS NOTES
Hospitalist Progress Note    NAME:   Paula Renteria   : 1970   MRN: [id number]     Date/Time: 3/2/2024 12:20 PM  Patient PCP: Lion Liu MD    Estimated discharge date: Greater than 48 hours  Barriers: IV antibiotics, Ortho evaluation, procedure 3/4/24  Hospital Course:  Paula Renteria is a 53 y.o.  female with PMHx significant for several lumbar fusion surgeries, most recently a fusion in  that required several washout surgeries and has had an open wound since.  Patient admitted 24  because her orthopedic doctor James recommended her to come to the ER for septic arthritis of left hip. CT shows septic arthritis of the left hip with collapse of the left femoral head.  Diffuse osteosclerosis of the left hemipelvis can be seen with chronic osteomyelitis.  Culture of tissue from 2024 shows moderate Pseudomonas MDRO and moderate bacteroides thetaiotaomicron beta lactamase positive.   ID consulted.  ID started patient on Avycaz. Ortho consulted, will perform left hip Girdlestone procedure 3/4/2024.  Gaffney perioperative risk shows 0.5% risk of MI or cardiac arrest. [employer] Risk calculator shows an average risk of serious complications including surgical site infections, UTI, pneumonia.  Below average chance of VTE, renal failure, death.  Patient medically cleared for procedure.      Assessment / Plan:  Left hip pain  Septic arthritis of left hip   -CT shows septic arthritis of the left hip with collapse of the left femoral head.  Diffuse osteosclerosis of the left hemipelvis can be seen with chronic osteomyelitis  -Pain management  - ID consulted, placed on Avycaz  -Ortho evaluated, will perform  Left hip Girdlestone procedure and placement of antibiotic impregnated antibiotic beads to function as an on articulating spacer. 3/4/2024.     Chronic osteomyelitis of lumbar spine  -Wound VAC in place  - CT shows loosening of the hardware and L3, L4, L5 and left pelvis.  Large  coordinator.  Patient's plan of care was discussed; medications were reviewed and discharge planning was addressed.     ________________________________________________________________________  Total NON critical care TIME:  35  Minutes    Total CRITICAL CARE TIME Spent:   Minutes non procedure based      Comments   >50% of visit spent in counseling and coordination of care     ________________________________________________________________________  Merna Bateman PA-C     Procedures: see electronic medical records for all procedures/Xrays and details which were not copied into this note but were reviewed prior to creation of Plan.      LABS:  I reviewed today's most current labs and imaging studies.  Pertinent labs include:  Recent Labs     02/28/24  1447 03/01/24  0748 03/02/24  0545   WBC 6.2 4.9 5.1   HGB 8.8* 8.8* 8.4*   HCT 30.1* 30.2* 27.9*   * 381 371       Recent Labs     02/28/24  1447 03/01/24  0748 03/02/24  0545    139 138   K 3.5 3.4* 3.7    108 107   CO2 29 29 29   BUN 12 8 11   ALT 32  --   --          Signed: Merna Bateman PA-C

## 2024-03-03 LAB
ANION GAP SERPL CALC-SCNC: 3 MMOL/L (ref 5–15)
BASOPHILS # BLD: 0 K/UL (ref 0–0.1)
BASOPHILS NFR BLD: 1 % (ref 0–1)
BUN SERPL-MCNC: 11 MG/DL (ref 6–20)
BUN/CREAT SERPL: 23 (ref 12–20)
CA-I BLD-MCNC: 9.3 MG/DL (ref 8.5–10.1)
CHLORIDE SERPL-SCNC: 105 MMOL/L (ref 97–108)
CO2 SERPL-SCNC: 29 MMOL/L (ref 21–32)
CREAT SERPL-MCNC: 0.47 MG/DL (ref 0.55–1.02)
DIFFERENTIAL METHOD BLD: ABNORMAL
EOSINOPHIL # BLD: 0.1 K/UL (ref 0–0.4)
EOSINOPHIL NFR BLD: 2 % (ref 0–7)
ERYTHROCYTE [DISTWIDTH] IN BLOOD BY AUTOMATED COUNT: 19.7 % (ref 11.5–14.5)
GLUCOSE SERPL-MCNC: 106 MG/DL (ref 65–100)
HCT VFR BLD AUTO: 31 % (ref 35–47)
HGB BLD-MCNC: 9.1 G/DL (ref 11.5–16)
IMM GRANULOCYTES # BLD AUTO: 0 K/UL (ref 0–0.04)
IMM GRANULOCYTES NFR BLD AUTO: 1 % (ref 0–0.5)
LYMPHOCYTES # BLD: 0.8 K/UL (ref 0.8–3.5)
LYMPHOCYTES NFR BLD: 15 % (ref 12–49)
MCH RBC QN AUTO: 21.8 PG (ref 26–34)
MCHC RBC AUTO-ENTMCNC: 29.4 G/DL (ref 30–36.5)
MCV RBC AUTO: 74.2 FL (ref 80–99)
MONOCYTES # BLD: 0.5 K/UL (ref 0–1)
MONOCYTES NFR BLD: 9 % (ref 5–13)
NEUTS SEG # BLD: 3.9 K/UL (ref 1.8–8)
NEUTS SEG NFR BLD: 72 % (ref 32–75)
NRBC # BLD: 0 K/UL (ref 0–0.01)
NRBC BLD-RTO: 0 PER 100 WBC
PLATELET # BLD AUTO: 442 K/UL (ref 150–400)
PMV BLD AUTO: 8.9 FL (ref 8.9–12.9)
POTASSIUM SERPL-SCNC: 3.6 MMOL/L (ref 3.5–5.1)
RBC # BLD AUTO: 4.18 M/UL (ref 3.8–5.2)
SODIUM SERPL-SCNC: 137 MMOL/L (ref 136–145)
WBC # BLD AUTO: 5.4 K/UL (ref 3.6–11)

## 2024-03-03 PROCEDURE — 86923 COMPATIBILITY TEST ELECTRIC: CPT

## 2024-03-03 PROCEDURE — 2580000003 HC RX 258: Performed by: INTERNAL MEDICINE

## 2024-03-03 PROCEDURE — 36415 COLL VENOUS BLD VENIPUNCTURE: CPT

## 2024-03-03 PROCEDURE — 02HV33Z INSERTION OF INFUSION DEVICE INTO SUPERIOR VENA CAVA, PERCUTANEOUS APPROACH: ICD-10-PCS | Performed by: INTERNAL MEDICINE

## 2024-03-03 PROCEDURE — 80048 BASIC METABOLIC PNL TOTAL CA: CPT

## 2024-03-03 PROCEDURE — 6370000000 HC RX 637 (ALT 250 FOR IP): Performed by: INTERNAL MEDICINE

## 2024-03-03 PROCEDURE — 6360000002 HC RX W HCPCS: Performed by: INTERNAL MEDICINE

## 2024-03-03 PROCEDURE — 85025 COMPLETE CBC W/AUTO DIFF WBC: CPT

## 2024-03-03 PROCEDURE — 86901 BLOOD TYPING SEROLOGIC RH(D): CPT

## 2024-03-03 PROCEDURE — 1100000000 HC RM PRIVATE

## 2024-03-03 PROCEDURE — 2500000003 HC RX 250 WO HCPCS

## 2024-03-03 PROCEDURE — 86900 BLOOD TYPING SEROLOGIC ABO: CPT

## 2024-03-03 PROCEDURE — 86850 RBC ANTIBODY SCREEN: CPT

## 2024-03-03 PROCEDURE — 6360000002 HC RX W HCPCS

## 2024-03-03 PROCEDURE — 2580000003 HC RX 258

## 2024-03-03 PROCEDURE — 36569 INSJ PICC 5 YR+ W/O IMAGING: CPT

## 2024-03-03 PROCEDURE — 6370000000 HC RX 637 (ALT 250 FOR IP)

## 2024-03-03 RX ORDER — CARVEDILOL 3.12 MG/1
6.25 TABLET ORAL 2 TIMES DAILY WITH MEALS
Status: DISCONTINUED | OUTPATIENT
Start: 2024-03-03 | End: 2024-03-13 | Stop reason: HOSPADM

## 2024-03-03 RX ORDER — HYDROMORPHONE HYDROCHLORIDE 1 MG/ML
1 INJECTION, SOLUTION INTRAMUSCULAR; INTRAVENOUS; SUBCUTANEOUS EVERY 4 HOURS PRN
Status: DISCONTINUED | OUTPATIENT
Start: 2024-03-03 | End: 2024-03-06

## 2024-03-03 RX ORDER — OXYCODONE HYDROCHLORIDE AND ACETAMINOPHEN 5; 325 MG/1; MG/1
1 TABLET ORAL EVERY 4 HOURS PRN
Status: DISCONTINUED | OUTPATIENT
Start: 2024-03-03 | End: 2024-03-06

## 2024-03-03 RX ADMIN — ENOXAPARIN SODIUM 40 MG: 100 INJECTION SUBCUTANEOUS at 08:42

## 2024-03-03 RX ADMIN — HYDRALAZINE HYDROCHLORIDE 10 MG: 20 INJECTION INTRAMUSCULAR; INTRAVENOUS at 02:11

## 2024-03-03 RX ADMIN — HYDROMORPHONE HYDROCHLORIDE 1 MG: 1 INJECTION, SOLUTION INTRAMUSCULAR; INTRAVENOUS; SUBCUTANEOUS at 18:25

## 2024-03-03 RX ADMIN — DULOXETINE HYDROCHLORIDE 60 MG: 30 CAPSULE, DELAYED RELEASE ORAL at 20:58

## 2024-03-03 RX ADMIN — SODIUM CHLORIDE, PRESERVATIVE FREE 10 ML: 5 INJECTION INTRAVENOUS at 20:58

## 2024-03-03 RX ADMIN — HYDROMORPHONE HYDROCHLORIDE 1 MG: 1 INJECTION, SOLUTION INTRAMUSCULAR; INTRAVENOUS; SUBCUTANEOUS at 09:49

## 2024-03-03 RX ADMIN — DIPHENHYDRAMINE HYDROCHLORIDE 25 MG: 25 CAPSULE ORAL at 00:08

## 2024-03-03 RX ADMIN — OXCARBAZEPINE 300 MG: 300 TABLET, FILM COATED ORAL at 08:41

## 2024-03-03 RX ADMIN — OXCARBAZEPINE 300 MG: 300 TABLET, FILM COATED ORAL at 16:12

## 2024-03-03 RX ADMIN — DIPHENHYDRAMINE HYDROCHLORIDE 25 MG: 25 CAPSULE ORAL at 09:49

## 2024-03-03 RX ADMIN — SODIUM CHLORIDE, PRESERVATIVE FREE 10 ML: 5 INJECTION INTRAVENOUS at 08:44

## 2024-03-03 RX ADMIN — OXYCODONE HYDROCHLORIDE AND ACETAMINOPHEN 1 TABLET: 5; 325 TABLET ORAL at 16:11

## 2024-03-03 RX ADMIN — METRONIDAZOLE 500 MG: 500 INJECTION, SOLUTION INTRAVENOUS at 00:52

## 2024-03-03 RX ADMIN — BACLOFEN 20 MG: 10 TABLET ORAL at 08:42

## 2024-03-03 RX ADMIN — CARVEDILOL 6.25 MG: 3.12 TABLET, FILM COATED ORAL at 16:10

## 2024-03-03 RX ADMIN — LEVOTHYROXINE SODIUM 100 MCG: 0.07 TABLET ORAL at 06:00

## 2024-03-03 RX ADMIN — HYDROMORPHONE HYDROCHLORIDE 1 MG: 1 INJECTION, SOLUTION INTRAMUSCULAR; INTRAVENOUS; SUBCUTANEOUS at 23:33

## 2024-03-03 RX ADMIN — VANCOMYCIN HYDROCHLORIDE 1000 MG: 1 INJECTION, POWDER, LYOPHILIZED, FOR SOLUTION INTRAVENOUS at 20:58

## 2024-03-03 RX ADMIN — OXCARBAZEPINE 300 MG: 300 TABLET, FILM COATED ORAL at 12:25

## 2024-03-03 RX ADMIN — ALPRAZOLAM 0.5 MG: 0.25 TABLET ORAL at 20:57

## 2024-03-03 RX ADMIN — BACLOFEN 20 MG: 10 TABLET ORAL at 20:58

## 2024-03-03 RX ADMIN — CARVEDILOL 6.25 MG: 3.12 TABLET, FILM COATED ORAL at 09:49

## 2024-03-03 RX ADMIN — DIPHENHYDRAMINE HYDROCHLORIDE 25 MG: 25 CAPSULE ORAL at 20:58

## 2024-03-03 RX ADMIN — CEFTAZIDIME, AVIBACTAM 2.5 G: 2; .5 POWDER, FOR SOLUTION INTRAVENOUS at 05:45

## 2024-03-03 RX ADMIN — CEFTAZIDIME, AVIBACTAM 2.5 G: 2; .5 POWDER, FOR SOLUTION INTRAVENOUS at 16:20

## 2024-03-03 RX ADMIN — ALPRAZOLAM 0.5 MG: 0.25 TABLET ORAL at 03:25

## 2024-03-03 RX ADMIN — METRONIDAZOLE 500 MG: 500 INJECTION, SOLUTION INTRAVENOUS at 08:42

## 2024-03-03 RX ADMIN — OXYCODONE HYDROCHLORIDE AND ACETAMINOPHEN 1 TABLET: 5; 325 TABLET ORAL at 21:42

## 2024-03-03 RX ADMIN — VANCOMYCIN HYDROCHLORIDE 1000 MG: 1 INJECTION, POWDER, LYOPHILIZED, FOR SOLUTION INTRAVENOUS at 09:49

## 2024-03-03 RX ADMIN — CEFTAZIDIME, AVIBACTAM 2.5 G: 2; .5 POWDER, FOR SOLUTION INTRAVENOUS at 21:59

## 2024-03-03 RX ADMIN — OXYCODONE 5 MG: 5 TABLET ORAL at 02:11

## 2024-03-03 RX ADMIN — DULOXETINE HYDROCHLORIDE 60 MG: 30 CAPSULE, DELAYED RELEASE ORAL at 08:41

## 2024-03-03 RX ADMIN — HYDROMORPHONE HYDROCHLORIDE 0.5 MG: 1 INJECTION, SOLUTION INTRAMUSCULAR; INTRAVENOUS; SUBCUTANEOUS at 05:46

## 2024-03-03 RX ADMIN — HYDROMORPHONE HYDROCHLORIDE 1 MG: 1 INJECTION, SOLUTION INTRAMUSCULAR; INTRAVENOUS; SUBCUTANEOUS at 13:53

## 2024-03-03 RX ADMIN — METRONIDAZOLE 500 MG: 500 INJECTION, SOLUTION INTRAVENOUS at 16:11

## 2024-03-03 ASSESSMENT — PAIN DESCRIPTION - DESCRIPTORS
DESCRIPTORS: THROBBING;SPASM
DESCRIPTORS: DISCOMFORT
DESCRIPTORS: ACHING
DESCRIPTORS: ACHING
DESCRIPTORS: CRAMPING;DULL;HYPERSENSITIVITY
DESCRIPTORS: ACHING
DESCRIPTORS: ACHING;CRAMPING;DULL

## 2024-03-03 ASSESSMENT — PAIN SCALES - GENERAL
PAINLEVEL_OUTOF10: 6
PAINLEVEL_OUTOF10: 5
PAINLEVEL_OUTOF10: 5
PAINLEVEL_OUTOF10: 7
PAINLEVEL_OUTOF10: 10
PAINLEVEL_OUTOF10: 5
PAINLEVEL_OUTOF10: 7
PAINLEVEL_OUTOF10: 7
PAINLEVEL_OUTOF10: 8
PAINLEVEL_OUTOF10: 8
PAINLEVEL_OUTOF10: 7

## 2024-03-03 ASSESSMENT — PAIN DESCRIPTION - LOCATION
LOCATION: HIP;LEG;KNEE
LOCATION: LEG;HIP
LOCATION: HIP
LOCATION: HIP;LEG;KNEE
LOCATION: HIP
LOCATION: HIP
LOCATION: BACK;HIP

## 2024-03-03 ASSESSMENT — PAIN DESCRIPTION - ORIENTATION
ORIENTATION: RIGHT
ORIENTATION: RIGHT
ORIENTATION: LEFT
ORIENTATION: LEFT
ORIENTATION: LEFT;MID
ORIENTATION: LEFT
ORIENTATION: RIGHT;LEFT

## 2024-03-03 ASSESSMENT — PAIN - FUNCTIONAL ASSESSMENT
PAIN_FUNCTIONAL_ASSESSMENT: PREVENTS OR INTERFERES SOME ACTIVE ACTIVITIES AND ADLS

## 2024-03-03 ASSESSMENT — PAIN SCALES - WONG BAKER
WONGBAKER_NUMERICALRESPONSE: 0
WONGBAKER_NUMERICALRESPONSE: NO HURT

## 2024-03-03 NOTE — PROGRESS NOTES
Hospitalist Progress Note    NAME:   Paula Renteria   : 1970   MRN: [id number]     Date/Time: 3/3/2024 3:44 PM  Patient PCP: Lion Liu MD    Estimated discharge date: Greater than 48 hours  Barriers: IV antibiotics, Ortho evaluation, procedure 3/4/24  Hospital Course:  Paula Renteria is a 53 y.o.  female with PMHx significant for several lumbar fusion surgeries, most recently a fusion in  that required several washout surgeries and has had an open wound since.  Patient admitted 24  because her orthopedic doctor James recommended her to come to the ER for septic arthritis of left hip. CT shows septic arthritis of the left hip with collapse of the left femoral head.  Diffuse osteosclerosis of the left hemipelvis can be seen with chronic osteomyelitis.  Culture of tissue from 2024 shows moderate Pseudomonas MDRO and moderate bacteroides thetaiotaomicron beta lactamase positive.   ID consulted.  ID started patient on Avycaz.  PICC line placed.  Ortho consulted, will perform left hip Girdlestone procedure 3/4/2024.  Gaffney perioperative risk shows 0.5% risk of MI or cardiac arrest. [employer] Risk calculator shows an average risk of serious complications including surgical site infections, UTI, pneumonia.  Below average chance of VTE, renal failure, death.  Patient medically cleared for procedure.      Assessment / Plan:  Left hip pain  Septic arthritis of left hip   -CT shows septic arthritis of the left hip with collapse of the left femoral head.  Diffuse osteosclerosis of the left hemipelvis can be seen with chronic osteomyelitis  -Pain management  - ID consulted, placed on Avycaz.  PICC line placed  -Ortho evaluated, will perform  Left hip Girdlestone procedure and placement of antibiotic impregnated antibiotic beads to function as an on articulating spacer. 3/4/2024.     Chronic osteomyelitis of lumbar spine  -Wound VAC in place  - CT shows loosening of the hardware  care was discussed; medications were reviewed and discharge planning was addressed.     ________________________________________________________________________  Total NON critical care TIME:  35  Minutes    Total CRITICAL CARE TIME Spent:   Minutes non procedure based      Comments   >50% of visit spent in counseling and coordination of care     ________________________________________________________________________  Merna Bateman PA-C     Procedures: see electronic medical records for all procedures/Xrays and details which were not copied into this note but were reviewed prior to creation of Plan.      LABS:  I reviewed today's most current labs and imaging studies.  Pertinent labs include:  Recent Labs     03/01/24  0748 03/02/24  0545 03/03/24  0800   WBC 4.9 5.1 5.4   HGB 8.8* 8.4* 9.1*   HCT 30.2* 27.9* 31.0*    371 442*       Recent Labs     03/01/24  0748 03/02/24  0545 03/03/24  0800    138 137   K 3.4* 3.7 3.6    107 105   CO2 29 29 29   BUN 8 11 11         Signed: Merna Bateman PA-C

## 2024-03-03 NOTE — PROGRESS NOTES
PICC Placement Note    PRE-PROCEDURE VERIFICATION    Recent Labs     03/03/24  0800   BUN 11   *   WBC 5.4     Allergies: Latex, Colloidal oatmeal, Penicillins, Gabapentin, Oatmeal, and Vancomycin        A double lumen PICC line was inserted,   Date Placed: 03/03/2024 (date)  Internal Catheter Total Length: 36 (cm)  Exposed Catheter Length: 1 (cm)  Vein Selection for PICC:right brachial    Tip Location Confirmed: yes. Tip location results state the tip location overlies the     superior vena cava. Via 3cg    Line is okay to use: yes    Mejia Russell RN

## 2024-03-03 NOTE — PLAN OF CARE

## 2024-03-04 ENCOUNTER — APPOINTMENT (OUTPATIENT)
Facility: HOSPITAL | Age: 54
DRG: 481 | End: 2024-03-04
Payer: MEDICARE

## 2024-03-04 ENCOUNTER — ANESTHESIA (OUTPATIENT)
Facility: HOSPITAL | Age: 54
End: 2024-03-04
Payer: MEDICARE

## 2024-03-04 ENCOUNTER — ANESTHESIA EVENT (OUTPATIENT)
Facility: HOSPITAL | Age: 54
End: 2024-03-04
Payer: MEDICARE

## 2024-03-04 LAB
ANION GAP SERPL CALC-SCNC: 3 MMOL/L (ref 5–15)
BASOPHILS # BLD: 0 K/UL (ref 0–0.1)
BASOPHILS NFR BLD: 1 % (ref 0–1)
BUN SERPL-MCNC: 11 MG/DL (ref 6–20)
BUN/CREAT SERPL: 21 (ref 12–20)
CA-I BLD-MCNC: 9.6 MG/DL (ref 8.5–10.1)
CHLORIDE SERPL-SCNC: 107 MMOL/L (ref 97–108)
CO2 SERPL-SCNC: 27 MMOL/L (ref 21–32)
CREAT SERPL-MCNC: 0.52 MG/DL (ref 0.55–1.02)
DIFFERENTIAL METHOD BLD: ABNORMAL
EOSINOPHIL # BLD: 0.1 K/UL (ref 0–0.4)
EOSINOPHIL NFR BLD: 3 % (ref 0–7)
ERYTHROCYTE [DISTWIDTH] IN BLOOD BY AUTOMATED COUNT: 19.6 % (ref 11.5–14.5)
GLUCOSE SERPL-MCNC: 102 MG/DL (ref 65–100)
HCT VFR BLD AUTO: 31.5 % (ref 35–47)
HGB BLD-MCNC: 9.1 G/DL (ref 11.5–16)
IMM GRANULOCYTES # BLD AUTO: 0 K/UL (ref 0–0.04)
IMM GRANULOCYTES NFR BLD AUTO: 1 % (ref 0–0.5)
LYMPHOCYTES # BLD: 1 K/UL (ref 0.8–3.5)
LYMPHOCYTES NFR BLD: 22 % (ref 12–49)
MCH RBC QN AUTO: 21.6 PG (ref 26–34)
MCHC RBC AUTO-ENTMCNC: 28.9 G/DL (ref 30–36.5)
MCV RBC AUTO: 74.8 FL (ref 80–99)
MONOCYTES # BLD: 0.5 K/UL (ref 0–1)
MONOCYTES NFR BLD: 10 % (ref 5–13)
NEUTS SEG # BLD: 2.9 K/UL (ref 1.8–8)
NEUTS SEG NFR BLD: 63 % (ref 32–75)
NRBC # BLD: 0 K/UL (ref 0–0.01)
NRBC BLD-RTO: 0 PER 100 WBC
PLATELET # BLD AUTO: 409 K/UL (ref 150–400)
PMV BLD AUTO: 8.9 FL (ref 8.9–12.9)
POTASSIUM SERPL-SCNC: 3.6 MMOL/L (ref 3.5–5.1)
RBC # BLD AUTO: 4.21 M/UL (ref 3.8–5.2)
RBC MORPH BLD: ABNORMAL
SODIUM SERPL-SCNC: 137 MMOL/L (ref 136–145)
VANCOMYCIN SERPL-MCNC: 9.5 UG/ML
WBC # BLD AUTO: 4.5 K/UL (ref 3.6–11)

## 2024-03-04 PROCEDURE — 88312 SPECIAL STAINS GROUP 1: CPT

## 2024-03-04 PROCEDURE — 2W1MX6Z COMPRESSION OF LEFT LOWER EXTREMITY USING PRESSURE DRESSING: ICD-10-PCS | Performed by: ORTHOPAEDIC SURGERY

## 2024-03-04 PROCEDURE — 2580000003 HC RX 258: Performed by: INTERNAL MEDICINE

## 2024-03-04 PROCEDURE — 2500000003 HC RX 250 WO HCPCS

## 2024-03-04 PROCEDURE — 6360000002 HC RX W HCPCS: Performed by: ANESTHESIOLOGY

## 2024-03-04 PROCEDURE — 6360000002 HC RX W HCPCS: Performed by: INTERNAL MEDICINE

## 2024-03-04 PROCEDURE — 3600000005 HC SURGERY LEVEL 5 BASE: Performed by: ORTHOPAEDIC SURGERY

## 2024-03-04 PROCEDURE — 87070 CULTURE OTHR SPECIMN AEROBIC: CPT

## 2024-03-04 PROCEDURE — 88311 DECALCIFY TISSUE: CPT

## 2024-03-04 PROCEDURE — C1713 ANCHOR/SCREW BN/BN,TIS/BN: HCPCS | Performed by: ORTHOPAEDIC SURGERY

## 2024-03-04 PROCEDURE — 6370000000 HC RX 637 (ALT 250 FOR IP)

## 2024-03-04 PROCEDURE — 88305 TISSUE EXAM BY PATHOLOGIST: CPT

## 2024-03-04 PROCEDURE — 0QT70ZZ RESECTION OF LEFT UPPER FEMUR, OPEN APPROACH: ICD-10-PCS | Performed by: ORTHOPAEDIC SURGERY

## 2024-03-04 PROCEDURE — 7100000001 HC PACU RECOVERY - ADDTL 15 MIN: Performed by: ORTHOPAEDIC SURGERY

## 2024-03-04 PROCEDURE — 99232 SBSQ HOSP IP/OBS MODERATE 35: CPT | Performed by: INTERNAL MEDICINE

## 2024-03-04 PROCEDURE — 6370000000 HC RX 637 (ALT 250 FOR IP): Performed by: ORTHOPAEDIC SURGERY

## 2024-03-04 PROCEDURE — 6360000002 HC RX W HCPCS: Performed by: NURSE ANESTHETIST, CERTIFIED REGISTERED

## 2024-03-04 PROCEDURE — 36415 COLL VENOUS BLD VENIPUNCTURE: CPT

## 2024-03-04 PROCEDURE — 2500000003 HC RX 250 WO HCPCS: Performed by: ANESTHESIOLOGY

## 2024-03-04 PROCEDURE — 7100000000 HC PACU RECOVERY - FIRST 15 MIN: Performed by: ORTHOPAEDIC SURGERY

## 2024-03-04 PROCEDURE — 3700000001 HC ADD 15 MINUTES (ANESTHESIA): Performed by: ORTHOPAEDIC SURGERY

## 2024-03-04 PROCEDURE — 3E0U029 INTRODUCTION OF OTHER ANTI-INFECTIVE INTO JOINTS, OPEN APPROACH: ICD-10-PCS | Performed by: ORTHOPAEDIC SURGERY

## 2024-03-04 PROCEDURE — 85025 COMPLETE CBC W/AUTO DIFF WBC: CPT

## 2024-03-04 PROCEDURE — 0SHB08Z INSERTION OF SPACER INTO LEFT HIP JOINT, OPEN APPROACH: ICD-10-PCS | Performed by: ORTHOPAEDIC SURGERY

## 2024-03-04 PROCEDURE — 1100000000 HC RM PRIVATE

## 2024-03-04 PROCEDURE — 6370000000 HC RX 637 (ALT 250 FOR IP): Performed by: INTERNAL MEDICINE

## 2024-03-04 PROCEDURE — 87205 SMEAR GRAM STAIN: CPT

## 2024-03-04 PROCEDURE — 6360000002 HC RX W HCPCS: Performed by: ORTHOPAEDIC SURGERY

## 2024-03-04 PROCEDURE — 88307 TISSUE EXAM BY PATHOLOGIST: CPT

## 2024-03-04 PROCEDURE — 2580000003 HC RX 258: Performed by: NURSE ANESTHETIST, CERTIFIED REGISTERED

## 2024-03-04 PROCEDURE — 72170 X-RAY EXAM OF PELVIS: CPT

## 2024-03-04 PROCEDURE — 80202 ASSAY OF VANCOMYCIN: CPT

## 2024-03-04 PROCEDURE — 87076 CULTURE ANAEROBE IDENT EACH: CPT

## 2024-03-04 PROCEDURE — 2709999900 HC NON-CHARGEABLE SUPPLY: Performed by: ORTHOPAEDIC SURGERY

## 2024-03-04 PROCEDURE — 2500000003 HC RX 250 WO HCPCS: Performed by: NURSE ANESTHETIST, CERTIFIED REGISTERED

## 2024-03-04 PROCEDURE — 3600000015 HC SURGERY LEVEL 5 ADDTL 15MIN: Performed by: ORTHOPAEDIC SURGERY

## 2024-03-04 PROCEDURE — 2720000010 HC SURG SUPPLY STERILE: Performed by: ORTHOPAEDIC SURGERY

## 2024-03-04 PROCEDURE — C1776 JOINT DEVICE (IMPLANTABLE): HCPCS | Performed by: ORTHOPAEDIC SURGERY

## 2024-03-04 PROCEDURE — 88304 TISSUE EXAM BY PATHOLOGIST: CPT

## 2024-03-04 PROCEDURE — 6360000002 HC RX W HCPCS

## 2024-03-04 PROCEDURE — 80048 BASIC METABOLIC PNL TOTAL CA: CPT

## 2024-03-04 PROCEDURE — 3700000000 HC ANESTHESIA ATTENDED CARE: Performed by: ORTHOPAEDIC SURGERY

## 2024-03-04 DEVICE — FULL DOSE BONE CEMENT, 10 PACK CATALOG NUMBER IS 6191-1-010
Type: IMPLANTABLE DEVICE | Site: HIP | Status: FUNCTIONAL
Brand: SIMPLEX

## 2024-03-04 RX ORDER — GLYCOPYRROLATE 0.2 MG/ML
INJECTION INTRAMUSCULAR; INTRAVENOUS PRN
Status: DISCONTINUED | OUTPATIENT
Start: 2024-03-04 | End: 2024-03-04 | Stop reason: SDUPTHER

## 2024-03-04 RX ORDER — VANCOMYCIN HYDROCHLORIDE 1 G/20ML
INJECTION, POWDER, LYOPHILIZED, FOR SOLUTION INTRAVENOUS PRN
Status: DISCONTINUED | OUTPATIENT
Start: 2024-03-04 | End: 2024-03-04 | Stop reason: HOSPADM

## 2024-03-04 RX ORDER — ONDANSETRON 2 MG/ML
INJECTION INTRAMUSCULAR; INTRAVENOUS PRN
Status: DISCONTINUED | OUTPATIENT
Start: 2024-03-04 | End: 2024-03-04 | Stop reason: SDUPTHER

## 2024-03-04 RX ORDER — KETAMINE HCL IN NACL, ISO-OSM 100MG/10ML
SYRINGE (ML) INJECTION PRN
Status: DISCONTINUED | OUTPATIENT
Start: 2024-03-04 | End: 2024-03-04 | Stop reason: SDUPTHER

## 2024-03-04 RX ORDER — TRANEXAMIC ACID 100 MG/ML
INJECTION, SOLUTION INTRAVENOUS
Status: DISPENSED
Start: 2024-03-04 | End: 2024-03-04

## 2024-03-04 RX ORDER — LABETALOL HYDROCHLORIDE 5 MG/ML
10 INJECTION, SOLUTION INTRAVENOUS
Status: DISCONTINUED | OUTPATIENT
Start: 2024-03-04 | End: 2024-03-04 | Stop reason: HOSPADM

## 2024-03-04 RX ORDER — TOBRAMYCIN 1.2 G/30ML
INJECTION, POWDER, LYOPHILIZED, FOR SOLUTION INTRAVENOUS PRN
Status: DISCONTINUED | OUTPATIENT
Start: 2024-03-04 | End: 2024-03-04 | Stop reason: HOSPADM

## 2024-03-04 RX ORDER — SODIUM CHLORIDE, SODIUM LACTATE, POTASSIUM CHLORIDE, CALCIUM CHLORIDE 600; 310; 30; 20 MG/100ML; MG/100ML; MG/100ML; MG/100ML
INJECTION, SOLUTION INTRAVENOUS CONTINUOUS PRN
Status: DISCONTINUED | OUTPATIENT
Start: 2024-03-04 | End: 2024-03-04

## 2024-03-04 RX ORDER — ROCURONIUM BROMIDE 10 MG/ML
INJECTION, SOLUTION INTRAVENOUS PRN
Status: DISCONTINUED | OUTPATIENT
Start: 2024-03-04 | End: 2024-03-04 | Stop reason: SDUPTHER

## 2024-03-04 RX ORDER — DIPHENHYDRAMINE HYDROCHLORIDE 50 MG/ML
INJECTION INTRAMUSCULAR; INTRAVENOUS
Status: DISPENSED
Start: 2024-03-04 | End: 2024-03-04

## 2024-03-04 RX ORDER — LACTOBACILLUS RHAMNOSUS GG 10B CELL
1 CAPSULE ORAL 2 TIMES DAILY
Status: DISCONTINUED | OUTPATIENT
Start: 2024-03-04 | End: 2024-03-13 | Stop reason: HOSPADM

## 2024-03-04 RX ORDER — NALOXONE HYDROCHLORIDE 0.4 MG/ML
INJECTION, SOLUTION INTRAMUSCULAR; INTRAVENOUS; SUBCUTANEOUS PRN
Status: DISCONTINUED | OUTPATIENT
Start: 2024-03-04 | End: 2024-03-07

## 2024-03-04 RX ORDER — GLUCAGON 1 MG/ML
1 KIT INJECTION PRN
Status: DISCONTINUED | OUTPATIENT
Start: 2024-03-04 | End: 2024-03-04 | Stop reason: HOSPADM

## 2024-03-04 RX ORDER — SODIUM CHLORIDE 9 MG/ML
INJECTION, SOLUTION INTRAVENOUS PRN
Status: DISCONTINUED | OUTPATIENT
Start: 2024-03-04 | End: 2024-03-08

## 2024-03-04 RX ORDER — SODIUM CHLORIDE 0.9 % (FLUSH) 0.9 %
5-40 SYRINGE (ML) INJECTION PRN
Status: DISCONTINUED | OUTPATIENT
Start: 2024-03-04 | End: 2024-03-04 | Stop reason: HOSPADM

## 2024-03-04 RX ORDER — PROPOFOL 10 MG/ML
INJECTION, EMULSION INTRAVENOUS PRN
Status: DISCONTINUED | OUTPATIENT
Start: 2024-03-04 | End: 2024-03-04 | Stop reason: SDUPTHER

## 2024-03-04 RX ORDER — ASCORBIC ACID 500 MG
1000 TABLET ORAL 2 TIMES DAILY
Status: DISCONTINUED | OUTPATIENT
Start: 2024-03-04 | End: 2024-03-13 | Stop reason: HOSPADM

## 2024-03-04 RX ORDER — MORPHINE SULFATE/0.9% NACL/PF 1 MG/ML
SYRINGE (ML) INJECTION CONTINUOUS
Status: DISCONTINUED | OUTPATIENT
Start: 2024-03-04 | End: 2024-03-07

## 2024-03-04 RX ORDER — OXYCODONE HYDROCHLORIDE 5 MG/1
5 TABLET ORAL PRN
Status: DISCONTINUED | OUTPATIENT
Start: 2024-03-04 | End: 2024-03-04 | Stop reason: HOSPADM

## 2024-03-04 RX ORDER — SODIUM CHLORIDE, SODIUM LACTATE, POTASSIUM CHLORIDE, CALCIUM CHLORIDE 600; 310; 30; 20 MG/100ML; MG/100ML; MG/100ML; MG/100ML
INJECTION, SOLUTION INTRAVENOUS ONCE
Status: DISCONTINUED | OUTPATIENT
Start: 2024-03-04 | End: 2024-03-04 | Stop reason: HOSPADM

## 2024-03-04 RX ORDER — DIPHENHYDRAMINE HYDROCHLORIDE 50 MG/ML
12.5 INJECTION INTRAMUSCULAR; INTRAVENOUS
Status: DISCONTINUED | OUTPATIENT
Start: 2024-03-04 | End: 2024-03-04 | Stop reason: HOSPADM

## 2024-03-04 RX ORDER — OXYCODONE HYDROCHLORIDE 5 MG/1
10 TABLET ORAL PRN
Status: DISCONTINUED | OUTPATIENT
Start: 2024-03-04 | End: 2024-03-04 | Stop reason: HOSPADM

## 2024-03-04 RX ORDER — DIPHENHYDRAMINE HYDROCHLORIDE 50 MG/ML
25 INJECTION INTRAMUSCULAR; INTRAVENOUS ONCE
Status: DISCONTINUED | OUTPATIENT
Start: 2024-03-04 | End: 2024-03-08

## 2024-03-04 RX ORDER — MULTIVITAMIN WITH IRON
1 TABLET ORAL DAILY
Status: DISCONTINUED | OUTPATIENT
Start: 2024-03-04 | End: 2024-03-13 | Stop reason: HOSPADM

## 2024-03-04 RX ORDER — ONDANSETRON 2 MG/ML
4 INJECTION INTRAMUSCULAR; INTRAVENOUS
Status: DISCONTINUED | OUTPATIENT
Start: 2024-03-04 | End: 2024-03-04 | Stop reason: HOSPADM

## 2024-03-04 RX ORDER — PHENYLEPHRINE HCL IN 0.9% NACL 0.4MG/10ML
SYRINGE (ML) INTRAVENOUS PRN
Status: DISCONTINUED | OUTPATIENT
Start: 2024-03-04 | End: 2024-03-04 | Stop reason: SDUPTHER

## 2024-03-04 RX ORDER — MAGNESIUM SULFATE IN WATER 40 MG/ML
INJECTION, SOLUTION INTRAVENOUS PRN
Status: DISCONTINUED | OUTPATIENT
Start: 2024-03-04 | End: 2024-03-04 | Stop reason: SDUPTHER

## 2024-03-04 RX ORDER — LORAZEPAM 2 MG/ML
0.5 INJECTION INTRAMUSCULAR EVERY 10 MIN PRN
Status: DISCONTINUED | OUTPATIENT
Start: 2024-03-04 | End: 2024-03-04 | Stop reason: HOSPADM

## 2024-03-04 RX ORDER — METOCLOPRAMIDE HYDROCHLORIDE 5 MG/ML
10 INJECTION INTRAMUSCULAR; INTRAVENOUS
Status: DISCONTINUED | OUTPATIENT
Start: 2024-03-04 | End: 2024-03-04 | Stop reason: HOSPADM

## 2024-03-04 RX ORDER — FENTANYL CITRATE 50 UG/ML
50 INJECTION, SOLUTION INTRAMUSCULAR; INTRAVENOUS EVERY 5 MIN PRN
Status: DISCONTINUED | OUTPATIENT
Start: 2024-03-04 | End: 2024-03-04 | Stop reason: HOSPADM

## 2024-03-04 RX ORDER — LABETALOL HYDROCHLORIDE 5 MG/ML
INJECTION, SOLUTION INTRAVENOUS PRN
Status: DISCONTINUED | OUTPATIENT
Start: 2024-03-04 | End: 2024-03-04 | Stop reason: SDUPTHER

## 2024-03-04 RX ORDER — IPRATROPIUM BROMIDE AND ALBUTEROL SULFATE 2.5; .5 MG/3ML; MG/3ML
1 SOLUTION RESPIRATORY (INHALATION)
Status: DISCONTINUED | OUTPATIENT
Start: 2024-03-04 | End: 2024-03-04 | Stop reason: HOSPADM

## 2024-03-04 RX ORDER — SODIUM CHLORIDE 0.9 % (FLUSH) 0.9 %
5-40 SYRINGE (ML) INJECTION EVERY 12 HOURS SCHEDULED
Status: DISCONTINUED | OUTPATIENT
Start: 2024-03-04 | End: 2024-03-04 | Stop reason: HOSPADM

## 2024-03-04 RX ORDER — SODIUM CHLORIDE 9 MG/ML
INJECTION, SOLUTION INTRAVENOUS PRN
Status: DISCONTINUED | OUTPATIENT
Start: 2024-03-04 | End: 2024-03-04 | Stop reason: HOSPADM

## 2024-03-04 RX ORDER — MIDAZOLAM HYDROCHLORIDE 1 MG/ML
INJECTION INTRAMUSCULAR; INTRAVENOUS PRN
Status: DISCONTINUED | OUTPATIENT
Start: 2024-03-04 | End: 2024-03-04 | Stop reason: SDUPTHER

## 2024-03-04 RX ORDER — LIDOCAINE HYDROCHLORIDE 20 MG/ML
INJECTION, SOLUTION EPIDURAL; INFILTRATION; INTRACAUDAL; PERINEURAL PRN
Status: DISCONTINUED | OUTPATIENT
Start: 2024-03-04 | End: 2024-03-04 | Stop reason: SDUPTHER

## 2024-03-04 RX ORDER — FENTANYL CITRATE 50 UG/ML
INJECTION, SOLUTION INTRAMUSCULAR; INTRAVENOUS PRN
Status: DISCONTINUED | OUTPATIENT
Start: 2024-03-04 | End: 2024-03-04 | Stop reason: SDUPTHER

## 2024-03-04 RX ORDER — PROPOFOL 10 MG/ML
INJECTION, EMULSION INTRAVENOUS CONTINUOUS PRN
Status: DISCONTINUED | OUTPATIENT
Start: 2024-03-04 | End: 2024-03-04 | Stop reason: SDUPTHER

## 2024-03-04 RX ORDER — DEXTROSE MONOHYDRATE 100 MG/ML
INJECTION, SOLUTION INTRAVENOUS CONTINUOUS PRN
Status: DISCONTINUED | OUTPATIENT
Start: 2024-03-04 | End: 2024-03-04 | Stop reason: HOSPADM

## 2024-03-04 RX ORDER — ETOMIDATE 2 MG/ML
INJECTION INTRAVENOUS PRN
Status: DISCONTINUED | OUTPATIENT
Start: 2024-03-04 | End: 2024-03-04 | Stop reason: SDUPTHER

## 2024-03-04 RX ORDER — HYDRALAZINE HYDROCHLORIDE 20 MG/ML
10 INJECTION INTRAMUSCULAR; INTRAVENOUS
Status: DISCONTINUED | OUTPATIENT
Start: 2024-03-04 | End: 2024-03-04 | Stop reason: HOSPADM

## 2024-03-04 RX ORDER — HYDROMORPHONE HYDROCHLORIDE 1 MG/ML
1 INJECTION, SOLUTION INTRAMUSCULAR; INTRAVENOUS; SUBCUTANEOUS EVERY 10 MIN PRN
Status: DISCONTINUED | OUTPATIENT
Start: 2024-03-04 | End: 2024-03-04 | Stop reason: HOSPADM

## 2024-03-04 RX ORDER — VANCOMYCIN HYDROCHLORIDE 1 G/20ML
INJECTION, POWDER, LYOPHILIZED, FOR SOLUTION INTRAVENOUS PRN
Status: DISCONTINUED | OUTPATIENT
Start: 2024-03-04 | End: 2024-03-04

## 2024-03-04 RX ORDER — LORAZEPAM 2 MG/ML
0.5 INJECTION INTRAMUSCULAR ONCE
Status: COMPLETED | OUTPATIENT
Start: 2024-03-04 | End: 2024-03-04

## 2024-03-04 RX ORDER — LIDOCAINE 4 G/G
1 PATCH TOPICAL AS NEEDED
Status: DISCONTINUED | OUTPATIENT
Start: 2024-03-04 | End: 2024-03-04 | Stop reason: HOSPADM

## 2024-03-04 RX ORDER — SODIUM CHLORIDE 9 MG/ML
INJECTION, SOLUTION INTRAVENOUS CONTINUOUS PRN
Status: DISCONTINUED | OUTPATIENT
Start: 2024-03-04 | End: 2024-03-04 | Stop reason: SDUPTHER

## 2024-03-04 RX ADMIN — OXYCODONE HYDROCHLORIDE AND ACETAMINOPHEN 1000 MG: 500 TABLET ORAL at 21:49

## 2024-03-04 RX ADMIN — ROCURONIUM BROMIDE 30 MG: 10 INJECTION, SOLUTION INTRAVENOUS at 11:38

## 2024-03-04 RX ADMIN — THERA TABS 1 TABLET: TAB at 17:51

## 2024-03-04 RX ADMIN — TRANEXAMIC ACID 1 G: 1 INJECTION, SOLUTION INTRAVENOUS at 11:10

## 2024-03-04 RX ADMIN — SODIUM CHLORIDE: 9 INJECTION, SOLUTION INTRAVENOUS at 10:54

## 2024-03-04 RX ADMIN — VANCOMYCIN HYDROCHLORIDE 1000 MG: 1 INJECTION, POWDER, LYOPHILIZED, FOR SOLUTION INTRAVENOUS at 09:34

## 2024-03-04 RX ADMIN — MIDAZOLAM HYDROCHLORIDE 1 MG: 2 INJECTION, SOLUTION INTRAMUSCULAR; INTRAVENOUS at 10:37

## 2024-03-04 RX ADMIN — PROPOFOL 20 MG: 10 INJECTION, EMULSION INTRAVENOUS at 11:00

## 2024-03-04 RX ADMIN — Medication 100 MCG: at 12:47

## 2024-03-04 RX ADMIN — SODIUM CHLORIDE: 9 INJECTION, SOLUTION INTRAVENOUS at 13:40

## 2024-03-04 RX ADMIN — ETOMIDATE INJECTION 8 MG: 2 SOLUTION INTRAVENOUS at 11:00

## 2024-03-04 RX ADMIN — DIPHENHYDRAMINE HYDROCHLORIDE 25 MG: 25 CAPSULE ORAL at 17:51

## 2024-03-04 RX ADMIN — ROCURONIUM BROMIDE 30 MG: 10 INJECTION, SOLUTION INTRAVENOUS at 12:50

## 2024-03-04 RX ADMIN — Medication 25 MG: at 11:45

## 2024-03-04 RX ADMIN — FENTANYL CITRATE 50 MCG: 50 INJECTION, SOLUTION INTRAMUSCULAR; INTRAVENOUS at 11:01

## 2024-03-04 RX ADMIN — HYDROMORPHONE HYDROCHLORIDE 1 MG: 1 INJECTION, SOLUTION INTRAMUSCULAR; INTRAVENOUS; SUBCUTANEOUS at 05:05

## 2024-03-04 RX ADMIN — PROPOFOL 200 MCG/KG/MIN: 10 INJECTION, EMULSION INTRAVENOUS at 11:01

## 2024-03-04 RX ADMIN — GLYCOPYRROLATE 0.1 MG: 0.2 INJECTION INTRAMUSCULAR; INTRAVENOUS at 10:22

## 2024-03-04 RX ADMIN — ROCURONIUM BROMIDE 40 MG: 10 INJECTION, SOLUTION INTRAVENOUS at 11:01

## 2024-03-04 RX ADMIN — Medication: at 17:45

## 2024-03-04 RX ADMIN — VANCOMYCIN HYDROCHLORIDE 1000 MG: 1 INJECTION, POWDER, LYOPHILIZED, FOR SOLUTION INTRAVENOUS at 21:42

## 2024-03-04 RX ADMIN — CEFTAZIDIME, AVIBACTAM 2.5 G: 2; .5 POWDER, FOR SOLUTION INTRAVENOUS at 06:45

## 2024-03-04 RX ADMIN — FENTANYL CITRATE 50 MCG: 50 INJECTION, SOLUTION INTRAMUSCULAR; INTRAVENOUS at 14:29

## 2024-03-04 RX ADMIN — Medication 5 MG: at 10:26

## 2024-03-04 RX ADMIN — Medication 25 MG: at 11:13

## 2024-03-04 RX ADMIN — LABETALOL HYDROCHLORIDE 5 MG: 5 INJECTION INTRAVENOUS at 11:58

## 2024-03-04 RX ADMIN — LORAZEPAM 0.5 MG: 2 INJECTION INTRAMUSCULAR; INTRAVENOUS at 16:30

## 2024-03-04 RX ADMIN — OXCARBAZEPINE 300 MG: 300 TABLET, FILM COATED ORAL at 16:29

## 2024-03-04 RX ADMIN — Medication 1 CAPSULE: at 20:40

## 2024-03-04 RX ADMIN — LABETALOL HYDROCHLORIDE 5 MG: 5 INJECTION INTRAVENOUS at 12:14

## 2024-03-04 RX ADMIN — Medication 100 MCG: at 12:58

## 2024-03-04 RX ADMIN — DIPHENHYDRAMINE HYDROCHLORIDE 25 MG: 25 CAPSULE ORAL at 21:43

## 2024-03-04 RX ADMIN — METRONIDAZOLE 500 MG: 500 INJECTION, SOLUTION INTRAVENOUS at 16:29

## 2024-03-04 RX ADMIN — SUGAMMADEX 200 MG: 100 INJECTION, SOLUTION INTRAVENOUS at 13:50

## 2024-03-04 RX ADMIN — HYDROMORPHONE HYDROCHLORIDE 0.5 MG: 1 INJECTION, SOLUTION INTRAMUSCULAR; INTRAVENOUS; SUBCUTANEOUS at 12:40

## 2024-03-04 RX ADMIN — MAGNESIUM SULFATE HEPTAHYDRATE 2000 MG: 40 INJECTION, SOLUTION INTRAVENOUS at 11:27

## 2024-03-04 RX ADMIN — ONDANSETRON 4 MG: 2 INJECTION INTRAMUSCULAR; INTRAVENOUS at 13:27

## 2024-03-04 RX ADMIN — HYDROMORPHONE HYDROCHLORIDE 1 MG: 1 INJECTION, SOLUTION INTRAMUSCULAR; INTRAVENOUS; SUBCUTANEOUS at 14:58

## 2024-03-04 RX ADMIN — Medication 100 MCG: at 13:13

## 2024-03-04 RX ADMIN — HYDROMORPHONE HYDROCHLORIDE 0.5 MG: 1 INJECTION, SOLUTION INTRAMUSCULAR; INTRAVENOUS; SUBCUTANEOUS at 13:43

## 2024-03-04 RX ADMIN — METRONIDAZOLE 500 MG: 500 INJECTION, SOLUTION INTRAVENOUS at 00:53

## 2024-03-04 RX ADMIN — LABETALOL HYDROCHLORIDE 5 MG: 5 INJECTION INTRAVENOUS at 11:50

## 2024-03-04 RX ADMIN — BACLOFEN 20 MG: 10 TABLET ORAL at 21:50

## 2024-03-04 RX ADMIN — HYDROMORPHONE HYDROCHLORIDE 0.5 MG: 1 INJECTION, SOLUTION INTRAMUSCULAR; INTRAVENOUS; SUBCUTANEOUS at 10:22

## 2024-03-04 RX ADMIN — FENTANYL CITRATE 50 MCG: 50 INJECTION, SOLUTION INTRAMUSCULAR; INTRAVENOUS at 11:07

## 2024-03-04 RX ADMIN — METRONIDAZOLE 500 MG: 500 INJECTION, SOLUTION INTRAVENOUS at 09:32

## 2024-03-04 RX ADMIN — LIDOCAINE HYDROCHLORIDE 100 MG: 20 INJECTION, SOLUTION EPIDURAL; INFILTRATION; INTRACAUDAL; PERINEURAL at 11:00

## 2024-03-04 RX ADMIN — ROCURONIUM BROMIDE 30 MG: 10 INJECTION, SOLUTION INTRAVENOUS at 12:07

## 2024-03-04 RX ADMIN — HYDROMORPHONE HYDROCHLORIDE 0.5 MG: 1 INJECTION, SOLUTION INTRAMUSCULAR; INTRAVENOUS; SUBCUTANEOUS at 10:59

## 2024-03-04 RX ADMIN — CEFTAZIDIME, AVIBACTAM 2.5 G: 2; .5 POWDER, FOR SOLUTION INTRAVENOUS at 20:00

## 2024-03-04 RX ADMIN — DULOXETINE HYDROCHLORIDE 60 MG: 30 CAPSULE, DELAYED RELEASE ORAL at 21:54

## 2024-03-04 ASSESSMENT — PAIN DESCRIPTION - ORIENTATION
ORIENTATION: LEFT

## 2024-03-04 ASSESSMENT — LIFESTYLE VARIABLES: SMOKING_STATUS: 1

## 2024-03-04 ASSESSMENT — PAIN DESCRIPTION - LOCATION
LOCATION: HIP
LOCATION: KNEE;LEG;HIP
LOCATION: HIP
LOCATION: HIP

## 2024-03-04 ASSESSMENT — PAIN SCALES - GENERAL
PAINLEVEL_OUTOF10: 5
PAINLEVEL_OUTOF10: 5
PAINLEVEL_OUTOF10: 9
PAINLEVEL_OUTOF10: 7
PAINLEVEL_OUTOF10: 7
PAINLEVEL_OUTOF10: 10

## 2024-03-04 ASSESSMENT — PAIN DESCRIPTION - DESCRIPTORS
DESCRIPTORS: ACHING;DISCOMFORT;PRESSURE;SQUEEZING
DESCRIPTORS: ACHING
DESCRIPTORS: SHARP;ACHING
DESCRIPTORS: ACHING
DESCRIPTORS: ACHING;DISCOMFORT;PRESSURE

## 2024-03-04 NOTE — PROGRESS NOTES
Vancomycin Dosing Consult  Paula Renteria is a 53 y.o. female with GPC bacteremia. Pharmacy was consulted by Dr. Garrison to dose and monitor vancomycin. Today is day 5.    Antibiotic regimen: Vancomycin + Avycaz    Temp (24hrs), Av.6 °F (37 °C), Min:98.2 °F (36.8 °C), Max:99.3 °F (37.4 °C)    Recent Labs     24  1447 24  0748   WBC 6.2 4.9   CREATININE 0.40* 0.46*   BUN 12 8     Est CrCl: 112 mL/min  Concomitant nephrotoxic drugs: Contrast agents    Cultures:   24: Blood cultures x 2: CoNS in 1 of 4 (< 24 hrs)  3/1 Blood: NGTD- preliminary    MRSA Swab: Not ordered, patient already received first dose of vancomycin    Target range: AUC/ISABELLA 400-600    Recent level history:  Date/Time Dose & Interval Measured Level (mcg/mL) Associated AUC/ISABELLA   3/1 @ 1413 1750mg x 1, then 1500mg q12h 18.4 727   3/4 @ 0632 1000 mg q12h 9.5 436        Assessment/Plan:   Patient has history of multiple back surgeries with hardware replacement and infection complications, completed a course of meropenem in 2024.   Documented allergy to vancomycin with hives. Per patient, vancomycin is tolerated if pre-medicated with Benadryl.   Renal function normal and stable  Level resulted earlier today at 9.5, predicted   Continue vancomycin dose 1000 mg q 12 hours  Next level scheduled for 3/7 @0600  BMP ordered through 3/7  Antimicrobial stop date TBD

## 2024-03-04 NOTE — PROGRESS NOTES
.Progress Note (Hospitalist, Internal Medicine)  IDENTIFYING INFORMATION   PATIENT:  Paula Renteria  MRN:  [id number]  ADMIT DATE: 2/28/2024  TIME OF EVALUATION: 3/4/2024 6:06 PM      HISTORY OF PRESENT ILLNESS   Paula Renteria is a 53 y.o. female who presents with       SUBJECTIVE     She is s/p debridement    MEDICATIONS   Medications Prior to Admission  Medications Prior to Admission: ondansetron (ZOFRAN) 4 MG tablet, Take 1 tablet by mouth every 8 hours as needed for Nausea or Vomiting  lidocaine 4 % external patch, Place 1 patch onto the skin daily  trolamine salicylate (ASPERCREME) 10 % cream, Apply topically as needed for Pain Apply topically as needed.  albuterol sulfate HFA (PROVENTIL;VENTOLIN;PROAIR) 108 (90 Base) MCG/ACT inhaler, INHALE TWO PUFFS BY MOUTH every 4 hours AS NEEDED  XANAX 0.5 MG tablet, Take 1 tablet by mouth nightly as needed.  docusate (COLACE, DULCOLAX) 100 MG CAPS, Take 100 mg by mouth daily  DULoxetine (CYMBALTA) 60 MG extended release capsule, Take 1 capsule by mouth 2 times daily  oxyCODONE (ROXICODONE) 5 MG immediate release tablet, Take 1 tablet by mouth every 4 hours as needed for Pain.  OXcarbazepine (TRILEPTAL) 150 MG tablet, Take 100 mg by mouth 3 times daily  baclofen (LIORESAL) 10 MG tablet, Take 5 tablets by mouth 2 times daily (Patient not taking: Reported on 2/15/2024)  famotidine (PEPCID) 20 MG tablet, Take 3 tablets by mouth 2 times daily  levothyroxine (SYNTHROID) 100 MCG tablet, Take 1 tablet by mouth Daily    Current Medications  Current Facility-Administered Medications   Medication Dose Route Frequency Provider Last Rate Last Admin    diphenhydrAMINE (BENADRYL) injection 25 mg  25 mg IntraVENous Once Dc Griffiths Jr., MD        diphenhydrAMINE (BENADRYL) 50 MG/ML injection             0.9 % sodium chloride infusion   IntraVENous PRN Dc Griffiths Jr., MD        [START ON 3/7/2024] Vancomycin level- please draw level on 3/7 @0600 with AM labs   Other RX Placeholder Shanae  (BENADRYL) capsule 25 mg  25 mg Oral PRN Promise Garrison MD   25 mg at 03/04/24 1751    vancomycin (VANCOCIN) intermittent dosing (placeholder)  1 each Other RX Placeholder Promise Garrison MD        albuterol sulfate HFA (PROVENTIL;VENTOLIN;PROAIR) 108 (90 Base) MCG/ACT inhaler 2 puff  2 puff Inhalation Q4H PRN Merna Bateman PA-C        docusate sodium (COLACE) capsule 100 mg  100 mg Oral Daily PRN Merna Bateman PA-C   100 mg at 03/01/24 1703    DULoxetine (CYMBALTA) extended release capsule 60 mg  60 mg Oral BID Merna Bateman PA-C   60 mg at 03/03/24 2058    levothyroxine (SYNTHROID) tablet 100 mcg  100 mcg Oral Daily Merna Bateman PA-C   100 mcg at 03/03/24 0600    lidocaine 4 % external patch 1 patch  1 patch TransDERmal Daily Merna Bateman PA-C   1 patch at 03/03/24 0843    ALPRAZolam (XANAX) tablet 0.5 mg  0.5 mg Oral Nightly PRN Merna Bateman PA-C   0.5 mg at 03/03/24 2057    baclofen (LIORESAL) tablet 20 mg  20 mg Oral BID Merna Bateman PA-C   20 mg at 03/03/24 2058    sodium chloride flush 0.9 % injection 5-40 mL  5-40 mL IntraVENous 2 times per day eMrna Bateman PA-C   10 mL at 03/03/24 2058    sodium chloride flush 0.9 % injection 5-40 mL  5-40 mL IntraVENous PRN Merna Bateman PA-C   10 mL at 03/01/24 2305    0.9 % sodium chloride infusion   IntraVENous PRN Merna Bateman PA-C        potassium chloride (KLOR-CON M) extended release tablet 40 mEq  40 mEq Oral PRN Merna Bateman PA-C        Or    potassium bicarb-citric acid (EFFER-K) effervescent tablet 40 mEq  40 mEq Oral PRN Merna Bateman PA-C        Or    potassium chloride 10 mEq/100 mL IVPB (Peripheral Line)  10 mEq IntraVENous PRN Merna Bateman PA-C        magnesium sulfate 2000 mg in 50 mL IVPB premix  2,000 mg IntraVENous PRN Merna Bateman PA-C        enoxaparin (LOVENOX) injection 40 mg  40 mg SubCUTAneous Daily Merna Bateman PA-C   40 mg at 03/03/24 0842    ondansetron  Results   Component Value Date/Time    WBC 4.5 03/04/2024 06:32 AM    WBC 5.4 03/03/2024 08:00 AM    WBC 5.1 03/02/2024 05:45 AM     No components found for: \"GRNLOCTYABS\"  Last 3 Platelets  No results found for: \"PLATELET\"  Chemistry  [unfilled]  [unfilled]  No results found for: \"LDH\"  Coagulation Studies  No results found for: \"PTT\", \"INR\"  Liver Function Studies  Lab Results   Component Value Date/Time    ALT 32 02/28/2024 02:47 PM    AST 32 02/28/2024 02:47 PM    ALKPHOS 289 02/28/2024 02:47 PM       Recent Imaging        Relevant labs and imaging reviewed    ASSESSMENT AND PLAN     Septic arthritis/chronic osteomyelitis involving the left hip  She is s/p left hip debridement  ID on board.  Patient on vancomycin and Avycaz  Follow-up on cultures    Staph species bacteremia  isolated from 1/2, extensive hardware in the spine       Repeat blood Cx from 03/01 is neg, continue on Vanc for now   Chronic lower back infection, underlying hardware w draining fistula      Repeated isolation of P.aeruginosa from wound Cx, most recently on 02/12, Bacteroides species also isolated       Continue on Avycaz and metronidazole. Local wound care    Microcytic anemia  Continue to monitor.  Check iron profile      [facility]  Hospitalist, Internal Medicine  3/4/2024 at 6:06 PM

## 2024-03-04 NOTE — PLAN OF CARE
Problem: Discharge Planning  Goal: Discharge to home or other facility with appropriate resources  Outcome: Progressing  Flowsheets (Taken 3/3/2024 2145)  Discharge to home or other facility with appropriate resources: Identify barriers to discharge with patient and caregiver     Problem: Skin/Tissue Integrity  Goal: Absence of new skin breakdown  Description: 1.  Monitor for areas of redness and/or skin breakdown  2.  Assess vascular access sites hourly  3.  Every 4-6 hours minimum:  Change oxygen saturation probe site  4.  Every 4-6 hours:  If on nasal continuous positive airway pressure, respiratory therapy assess nares and determine need for appliance change or resting period.  Outcome: Progressing     Problem: Safety - Adult  Goal: Free from fall injury  Outcome: Progressing     Problem: ABCDS Injury Assessment  Goal: Absence of physical injury  Outcome: Progressing     Problem: Pain  Goal: Verbalizes/displays adequate comfort level or baseline comfort level  Outcome: Progressing

## 2024-03-04 NOTE — ANESTHESIA PRE PROCEDURE
Department of Anesthesiology  Preprocedure Note       Name:  Paula Renteria   Age:  53 y.o.  :  1970                                          MRN:  [id number]         Date:  3/4/2024      Surgeon: Surgeon(s):  Haris Ramirez MD    Procedure: Procedure(s):  LEFT HIP GIRDLESTONE PROCEDURE    Medications prior to admission:   Prior to Admission medications    Medication Sig Start Date End Date Taking? Authorizing Provider   ondansetron (ZOFRAN) 4 MG tablet Take 1 tablet by mouth every 8 hours as needed for Nausea or Vomiting   Yes Lambert Robles MD   lidocaine 4 % external patch Place 1 patch onto the skin daily   Yes Lambert Robles MD   trolamine salicylate (ASPERCREME) 10 % cream Apply topically as needed for Pain Apply topically as needed.   Yes Lambert Robles MD   albuterol sulfate HFA (PROVENTIL;VENTOLIN;PROAIR) 108 (90 Base) MCG/ACT inhaler INHALE TWO PUFFS BY MOUTH every 4 hours AS NEEDED 7/10/18   Lambert Robles MD   XANAX 0.5 MG tablet Take 1 tablet by mouth nightly as needed. 19   Lambert Robles MD   docusate (COLACE, DULCOLAX) 100 MG CAPS Take 100 mg by mouth daily    Lambert Robles MD   DULoxetine (CYMBALTA) 60 MG extended release capsule Take 1 capsule by mouth 2 times daily 3/22/22   Lambert Robles MD   oxyCODONE (ROXICODONE) 5 MG immediate release tablet Take 1 tablet by mouth every 4 hours as needed for Pain.    Lambert Robles MD   OXcarbazepine (TRILEPTAL) 150 MG tablet Take 100 mg by mouth 3 times daily    Lambert Robles MD   baclofen (LIORESAL) 10 MG tablet Take 5 tablets by mouth 2 times daily  Patient not taking: Reported on 2/15/2024    Lambert Robles MD   famotidine (PEPCID) 20 MG tablet Take 3 tablets by mouth 2 times daily    Lambert Robles MD   levothyroxine (SYNTHROID) 100 MCG tablet Take 1 tablet by mouth Daily    Lambert Robles MD       Current medications:    Current Facility-Administered

## 2024-03-04 NOTE — PROGRESS NOTES
Infectious Disease Progress Note           Subjective:   Pt seen and examined at bedside. Underwent left hip surgery today, reports port-op pain, multiple intra-op Cxs are pending.  Objective:   Physical Exam:     BP (!) 109/59   Pulse 78   Temp 98.1 °F (36.7 °C) (Oral)   Resp 12   Ht 1.575 m (5' 2\")   Wt 70.3 kg (155 lb)   SpO2 94%   BMI 28.35 kg/m²    O2 Device: None (Room air)    Temp (24hrs), Av.1 °F (36.7 °C), Min:97.7 °F (36.5 °C), Max:98.6 °F (37 °C)    701 -  190  In: 1100 [I.V.:1000]  Out: 300    1901 -  07  In: 650 [P.O.:640; I.V.:10]  Out: 1550 [Urine:1400; Drains:150]    General: NAD, AAO x 4  HEENT: GIORGI, Moist mucosa   Lungs: CTA b/l, decreased at the bases, no wheeze/rhonchi   Heart: S1S2+, RRR, no murmur  Abdo: Soft, NT, ND, +BS   : No higginbotham cath   Exts left anterior hip dressing in place   Skin: lower back mid line wound w purulent drainage, no erythema or edema     Data Review:       Recent Days:    Recent Labs     24  0545 24  0800 24  0632   WBC 5.1 5.4 4.5   HGB 8.4* 9.1* 9.1*   HCT 27.9* 31.0* 31.5*    442* 409*       Recent Labs     24  0545 24  0800 24  0632   BUN 11 11 11   CREATININE 0.47* 0.47* 0.52*         Lab Results   Component Value Date/Time    CRP 9.71 2024 05:45 AM     Microbiology     Results       Procedure Component Value Units Date/Time    Culture, Tissue [[id number]] Collected: 24 1319    Order Status: Sent Specimen: Tissue Updated: 24    Culture, Wound [[id number]] Collected: 24 1309    Order Status: Sent Specimen: Swab from Joint, Hip Updated: 24 1544    Culture, Wound [[id number]] Collected: 24    Order Status: Sent Specimen: Swab from Joint, Hip Updated: 24 1545    Culture, Tissue [[id number]] Collected: 24    Order Status: Sent Specimen: Tissue from Joint, Hip Updated: 24 1547    Culture, Tissue

## 2024-03-04 NOTE — OP NOTE
Operative Note      Patient: Paula Renteria  YOB: 1970  MRN: [id number]    Date of Procedure: 3/4/2024    Pre-Op Diagnosis Codes:     * Pyogenic arthritis of left hip, due to unspecified organism ([facility]) [M00.9]     * Direct infection of unspecified hip in infectious and parasitic diseases classified elsewhere ([facility]) [M01.X59]     * Inflammatory reaction due to internal fixation device of spine, initial encounter ([facility]) [T84.63XA]    Post-Op Diagnosis: Same       Procedure(s):  DEBRIDEMENT LEFT HIP  GIRDLESTONE RESECTION LEFT HIP  ANTIBIOTIC CEMENT NON-ARTICULATING SPACER PLACEMENT LEFT HIP  ADDUCTOR TENOTOMY LEFT HIP    Surgeon(s):  Haris Ramirez MD    Assistant:   Surgical Assistant: Yogi Smith; Tim Schumacher    Anesthesia: General    Estimated Blood Loss (mL): 400    Complications: None    Specimens:   ID Type Source Tests Collected by Time Destination   1 : capsule Tissue Joint, Hip SURGICAL PATHOLOGY Haris Ramirez MD 3/4/2024 1206    2 : left femoral head Bone Joint, Hip SURGICAL PATHOLOGY Haris Ramirez MD 3/4/2024 1216    A : capsule Tissue Joint, Hip CULTURE, Haris Angel MD 3/4/2024 1208    B : joint tissue culture one Tissue Joint, Hip CULTURE, Haris Angel MD 3/4/2024 1213    C : left femoral head culture Bone Joint, Hip CULTURE, Haris Angel MD 3/4/2024 1218    D : joint tissue culture two Tissue Joint, Hip CULTURE, Haris Angel MD 3/4/2024 1237    E : joint tissue culture three Tissue Tissue CULTURE, Haris Angel MD 3/4/2024 1243    F : joint culture one Swab Joint, Hip CULTURE, WOUND Haris Ramirez MD 3/4/2024 1252    G : joint culture two Swab Joint, Hip CULTURE, WOUND Haris Ramirez MD 3/4/2024 1253    H : left femur culture Tissue Tissue CULTURE, Haris Angel MD 3/4/2024 1243        Implants:  Implant Name Type Inv. Item Serial No.  Lot No. [id number]  No. Used Action   CEMENT BNE 20ML 40GM FULL DOSE PMMA W/O ANTIBIO M VISC - SNA  CEMENT BNE 20ML 40GM FULL DOSE PMMA W/O ANTIBIO M VISC NA [employer] [id number] Left 3 Implanted         Drains:   Negative Pressure Wound Therapy Back Lower (Active)   $ Standard NPWT <=50 sq cm PER TX $ Yes 03/01/24 1500   Wound Type Surgical 03/04/24 0800   Unit Type UltraVac 03/01/24 1500   Dressing Type Black Foam;White Foam 03/04/24 0800   Number of pieces used 2 03/01/24 1500   Number of pieces removed 1 03/01/24 1500   Cycle Continuous 03/01/24 1500   Target Pressure (mmHg) 125 03/01/24 1500   Intensity 2 03/01/24 1500   Canister changed? Yes 02/19/24 1227   Dressing Status Clean;Dry;Intact 03/03/24 0558   Dressing Changed Changed/New 03/01/24 1842   Drainage Amount Small 03/04/24 0800   Drainage Description Other (Comment) 03/01/24 1500   Dressing Change Due 03/05/24 03/01/24 1500   Output (ml) 0 ml 03/04/24 0600   Wound Assessment Exposed structure bone;Exposed hardware 03/01/24 1500   Elaine-wound Assessment Fragile 03/01/24 1500   Shape Oval 03/01/24 1500   Odor None 03/03/24 0558       Negative Pressure Wound Therapy Hip Left;Anterior (Active)       External Urinary Catheter (Active)   Site Assessment Clean,dry & intact 03/03/24 2000   Placement Replaced 03/01/24 0930   Perineal Care Yes 03/01/24 0930   Urine Color Yellow 03/01/24 0930   Urine Appearance Clear 03/01/24 0930   Output (mL) 600 mL 03/01/24 1842       Findings: 1) Marked inflammation, adhesions and scarring noted in all the tissue planes.  2) scant purulent fluid noted in the joint, along with necrotic tissue  3) pockets of intermuscular purulent collection noted in the gluteal muscles as well as around the pubis  4) advanced destruction of femoral head noted, along with dissolution of acetabular articular cartilage    Proeop Note: The patient is a 53 year-old lady with a long and complex history of multiple surgeries on her spine.  She underwent a

## 2024-03-05 LAB
ANION GAP SERPL CALC-SCNC: 1 MMOL/L (ref 5–15)
BACTERIA SPEC CULT: NORMAL
BASOPHILS # BLD: 0 K/UL (ref 0–0.1)
BASOPHILS NFR BLD: 0 % (ref 0–1)
BUN SERPL-MCNC: 8 MG/DL (ref 6–20)
BUN/CREAT SERPL: 17 (ref 12–20)
CA-I BLD-MCNC: 8.5 MG/DL (ref 8.5–10.1)
CHLORIDE SERPL-SCNC: 107 MMOL/L (ref 97–108)
CO2 SERPL-SCNC: 29 MMOL/L (ref 21–32)
CREAT SERPL-MCNC: 0.48 MG/DL (ref 0.55–1.02)
CRP SERPL-MCNC: 10.8 MG/DL (ref 0–0.3)
DIFFERENTIAL METHOD BLD: ABNORMAL
EOSINOPHIL # BLD: 0.1 K/UL (ref 0–0.4)
EOSINOPHIL NFR BLD: 2 % (ref 0–7)
ERYTHROCYTE [DISTWIDTH] IN BLOOD BY AUTOMATED COUNT: 19.1 % (ref 11.5–14.5)
ERYTHROCYTE [SEDIMENTATION RATE] IN BLOOD: 71 MM/HR (ref 0–30)
GLUCOSE SERPL-MCNC: 121 MG/DL (ref 65–100)
HCT VFR BLD AUTO: 24.5 % (ref 35–47)
HGB BLD-MCNC: 7.2 G/DL (ref 11.5–16)
IMM GRANULOCYTES # BLD AUTO: 0 K/UL (ref 0–0.04)
IMM GRANULOCYTES NFR BLD AUTO: 1 % (ref 0–0.5)
LYMPHOCYTES # BLD: 0.9 K/UL (ref 0.8–3.5)
LYMPHOCYTES NFR BLD: 10 % (ref 12–49)
Lab: NORMAL
MCH RBC QN AUTO: 22.4 PG (ref 26–34)
MCHC RBC AUTO-ENTMCNC: 29.4 G/DL (ref 30–36.5)
MCV RBC AUTO: 76.1 FL (ref 80–99)
MONOCYTES # BLD: 0.9 K/UL (ref 0–1)
MONOCYTES NFR BLD: 11 % (ref 5–13)
NEUTS SEG # BLD: 6.7 K/UL (ref 1.8–8)
NEUTS SEG NFR BLD: 76 % (ref 32–75)
NRBC # BLD: 0 K/UL (ref 0–0.01)
NRBC BLD-RTO: 0 PER 100 WBC
PLATELET # BLD AUTO: 400 K/UL (ref 150–400)
PMV BLD AUTO: 9.1 FL (ref 8.9–12.9)
POTASSIUM SERPL-SCNC: 3.5 MMOL/L (ref 3.5–5.1)
RBC # BLD AUTO: 3.22 M/UL (ref 3.8–5.2)
SODIUM SERPL-SCNC: 137 MMOL/L (ref 136–145)
WBC # BLD AUTO: 8.7 K/UL (ref 3.6–11)

## 2024-03-05 PROCEDURE — 6370000000 HC RX 637 (ALT 250 FOR IP): Performed by: ORTHOPAEDIC SURGERY

## 2024-03-05 PROCEDURE — 85652 RBC SED RATE AUTOMATED: CPT

## 2024-03-05 PROCEDURE — 80048 BASIC METABOLIC PNL TOTAL CA: CPT

## 2024-03-05 PROCEDURE — 6360000002 HC RX W HCPCS: Performed by: ORTHOPAEDIC SURGERY

## 2024-03-05 PROCEDURE — 97530 THERAPEUTIC ACTIVITIES: CPT

## 2024-03-05 PROCEDURE — 6370000000 HC RX 637 (ALT 250 FOR IP): Performed by: INTERNAL MEDICINE

## 2024-03-05 PROCEDURE — 85025 COMPLETE CBC W/AUTO DIFF WBC: CPT

## 2024-03-05 PROCEDURE — 83540 ASSAY OF IRON: CPT

## 2024-03-05 PROCEDURE — 2580000003 HC RX 258: Performed by: INTERNAL MEDICINE

## 2024-03-05 PROCEDURE — 99232 SBSQ HOSP IP/OBS MODERATE 35: CPT | Performed by: INTERNAL MEDICINE

## 2024-03-05 PROCEDURE — 6360000002 HC RX W HCPCS: Performed by: INTERNAL MEDICINE

## 2024-03-05 PROCEDURE — 97161 PT EVAL LOW COMPLEX 20 MIN: CPT

## 2024-03-05 PROCEDURE — 6370000000 HC RX 637 (ALT 250 FOR IP)

## 2024-03-05 PROCEDURE — 2580000003 HC RX 258

## 2024-03-05 PROCEDURE — 1100000000 HC RM PRIVATE

## 2024-03-05 PROCEDURE — 82728 ASSAY OF FERRITIN: CPT

## 2024-03-05 PROCEDURE — 86140 C-REACTIVE PROTEIN: CPT

## 2024-03-05 PROCEDURE — 97605 NEG PRS WND THER DME<=50SQCM: CPT

## 2024-03-05 PROCEDURE — 36415 COLL VENOUS BLD VENIPUNCTURE: CPT

## 2024-03-05 PROCEDURE — 6360000002 HC RX W HCPCS

## 2024-03-05 RX ADMIN — CARVEDILOL 6.25 MG: 3.12 TABLET, FILM COATED ORAL at 18:26

## 2024-03-05 RX ADMIN — ENOXAPARIN SODIUM 40 MG: 100 INJECTION SUBCUTANEOUS at 09:23

## 2024-03-05 RX ADMIN — SODIUM CHLORIDE, PRESERVATIVE FREE 10 ML: 5 INJECTION INTRAVENOUS at 22:20

## 2024-03-05 RX ADMIN — Medication 1 CAPSULE: at 22:19

## 2024-03-05 RX ADMIN — OXYCODONE HYDROCHLORIDE AND ACETAMINOPHEN 1000 MG: 500 TABLET ORAL at 22:18

## 2024-03-05 RX ADMIN — CARVEDILOL 6.25 MG: 3.12 TABLET, FILM COATED ORAL at 09:23

## 2024-03-05 RX ADMIN — CEFTAZIDIME, AVIBACTAM 2.5 G: 2; .5 POWDER, FOR SOLUTION INTRAVENOUS at 05:51

## 2024-03-05 RX ADMIN — METRONIDAZOLE 500 MG: 500 INJECTION, SOLUTION INTRAVENOUS at 19:22

## 2024-03-05 RX ADMIN — Medication: at 14:38

## 2024-03-05 RX ADMIN — THERA TABS 1 TABLET: TAB at 09:24

## 2024-03-05 RX ADMIN — DULOXETINE HYDROCHLORIDE 60 MG: 30 CAPSULE, DELAYED RELEASE ORAL at 22:20

## 2024-03-05 RX ADMIN — Medication 1 CAPSULE: at 09:24

## 2024-03-05 RX ADMIN — SODIUM CHLORIDE, PRESERVATIVE FREE 10 ML: 5 INJECTION INTRAVENOUS at 09:27

## 2024-03-05 RX ADMIN — METRONIDAZOLE 500 MG: 500 INJECTION, SOLUTION INTRAVENOUS at 00:23

## 2024-03-05 RX ADMIN — VANCOMYCIN HYDROCHLORIDE 1000 MG: 1 INJECTION, POWDER, LYOPHILIZED, FOR SOLUTION INTRAVENOUS at 09:26

## 2024-03-05 RX ADMIN — LEVOTHYROXINE SODIUM 100 MCG: 0.07 TABLET ORAL at 05:49

## 2024-03-05 RX ADMIN — CEFTAZIDIME, AVIBACTAM 2.5 G: 2; .5 POWDER, FOR SOLUTION INTRAVENOUS at 16:21

## 2024-03-05 RX ADMIN — OXCARBAZEPINE 300 MG: 300 TABLET, FILM COATED ORAL at 09:24

## 2024-03-05 RX ADMIN — BACLOFEN 20 MG: 10 TABLET ORAL at 22:18

## 2024-03-05 RX ADMIN — DIPHENHYDRAMINE HYDROCHLORIDE 25 MG: 25 CAPSULE ORAL at 22:20

## 2024-03-05 RX ADMIN — DULOXETINE HYDROCHLORIDE 60 MG: 30 CAPSULE, DELAYED RELEASE ORAL at 09:24

## 2024-03-05 RX ADMIN — OXCARBAZEPINE 300 MG: 300 TABLET, FILM COATED ORAL at 18:26

## 2024-03-05 RX ADMIN — BACLOFEN 20 MG: 10 TABLET ORAL at 09:23

## 2024-03-05 RX ADMIN — OXYCODONE HYDROCHLORIDE AND ACETAMINOPHEN 1000 MG: 500 TABLET ORAL at 09:25

## 2024-03-05 RX ADMIN — METRONIDAZOLE 500 MG: 500 INJECTION, SOLUTION INTRAVENOUS at 11:19

## 2024-03-05 RX ADMIN — DIPHENHYDRAMINE HYDROCHLORIDE 25 MG: 25 CAPSULE ORAL at 09:24

## 2024-03-05 RX ADMIN — OXCARBAZEPINE 300 MG: 300 TABLET, FILM COATED ORAL at 13:34

## 2024-03-05 RX ADMIN — VANCOMYCIN HYDROCHLORIDE 1000 MG: 1 INJECTION, POWDER, LYOPHILIZED, FOR SOLUTION INTRAVENOUS at 22:18

## 2024-03-05 RX ADMIN — ACETAMINOPHEN 650 MG: 325 TABLET ORAL at 14:39

## 2024-03-05 ASSESSMENT — PAIN SCALES - GENERAL
PAINLEVEL_OUTOF10: 2
PAINLEVEL_OUTOF10: 6
PAINLEVEL_OUTOF10: 3
PAINLEVEL_OUTOF10: 7
PAINLEVEL_OUTOF10: 5

## 2024-03-05 ASSESSMENT — PAIN DESCRIPTION - LOCATION
LOCATION: HIP
LOCATION: BACK
LOCATION: BACK;HIP

## 2024-03-05 ASSESSMENT — PAIN DESCRIPTION - DESCRIPTORS
DESCRIPTORS: THROBBING
DESCRIPTORS: ACHING
DESCRIPTORS: ACHING

## 2024-03-05 ASSESSMENT — PAIN DESCRIPTION - ORIENTATION
ORIENTATION: LEFT
ORIENTATION: POSTERIOR;MID
ORIENTATION: LEFT

## 2024-03-05 ASSESSMENT — PAIN - FUNCTIONAL ASSESSMENT
PAIN_FUNCTIONAL_ASSESSMENT: PREVENTS OR INTERFERES SOME ACTIVE ACTIVITIES AND ADLS
PAIN_FUNCTIONAL_ASSESSMENT: PREVENTS OR INTERFERES WITH ALL ACTIVE AND SOME PASSIVE ACTIVITIES

## 2024-03-05 NOTE — WOUND CARE
Negative Pressure    NAME:  Paula Renteria  YOB: 1970  MEDICAL RECORD NUMBER:  [id number]  DATE:  2/28/2024    Applied Negative Pressure to  non-healing surgical incision to lumbar  [x] Applied skin barrier prep to janusz-wound.   [x] Cut strips of plastic drape to picture frame wound so that janusz-wound is     covered with the drape.   [] If bridging dressing to less prominent site, cover any intact skin that will come in contact with the Negative Pressure Therapy sponge, gauze or channel drain with plastic drape.  The sponge should never touch intact skin.   [x] Cut sponge, gauze or channel drain to size which will fit into the wound/ulcer bed without being forced.   [x] Be sure the sponge is large enough to hold the entire round plastic flange which is attached to the tubing.  Never allow flange to be larger than the sponge or it will produce suction damaging intact skin.  Total number of individual pieces of foam used within the wound bed: 1 x white foam and 1 x black foam  [] If bridging the dressing away from the primary site, be sure the bridge leads to a piece of sponge large enough to hold the entire flange without allowing any of the flange to overlap onto intact skin.   [x] Covered sponge, gauze or channel drain with plastic drape.   [x] Cut a hole in this plastic drape directly over the sponge the same size as the plastic drain tubing.   [x] Removed plastic liner from flange and apply it directly over the hole you cut.   [x] Removed the plastic cover from the flange.   [x] Attached the tubing to the wound/ulcer Negative Pressure Therapy and turn it on to be sure a vacuum is created and that there are no leaks.   [] If air leaks occur, use plastic drape to patch them.   [] Secured Negative Pressure Therapy dressing with ace wrap loosely if located on an extremity. Maintain tubing outside of ace wrap. Tubing must not exert pressure on intact skin.    Applied per  Guidelines  Patient

## 2024-03-05 NOTE — ANESTHESIA POSTPROCEDURE EVALUATION
Department of Anesthesiology  Postprocedure Note    Patient: Paula Renteria  MRN: [id number]  YOB: 1970  Date of evaluation: 3/4/2024    Procedure Summary       Date: 03/04/24 Room / Location: [facility] MAIN OR 07 / [facility] MAIN OR    Anesthesia Start: 1055 Anesthesia Stop: 1415    Procedure: DEBRIDEMENT LEFT HIP, GIRDLESTONE RESECTION LEFT HIP, ANTIBIOTIC CEMENT SPACER PLACEMENT LEFT HIP, ADDUCTOR TENOTOMY LEFT HIP (Left: Hip) Diagnosis:       Pyogenic arthritis of left hip, due to unspecified organism (HCC)      Direct infection of unspecified hip in infectious and parasitic diseases classified elsewhere (HCC)      Inflammatory reaction due to internal fixation device of spine, initial encounter (HCC)      (Pyogenic arthritis of left hip, due to unspecified organism (HCC) [M00.9])      (Direct infection of unspecified hip in infectious and parasitic diseases classified elsewhere (HCC) [M01.X59])      (Inflammatory reaction due to internal fixation device of spine, initial encounter ([facility]) [T84.63XA])    Surgeons: Haris Ramirez MD Responsible Provider: Dc Griffiths Jr., MD    Anesthesia Type: general ASA Status: 3            Anesthesia Type: No value filed.    Renetta Phase I: Renetta Score: 10    Renetta Phase II:      Anesthesia Post Evaluation    Patient location during evaluation: PACU  Patient participation: complete - patient cannot participate  Level of consciousness: awake  Pain score: 0  Airway patency: patent  Nausea & Vomiting: no nausea and no vomiting  Cardiovascular status: hemodynamically stable  Respiratory status: acceptable  Hydration status: stable  Multimodal analgesia pain management approach        No notable events documented.

## 2024-03-05 NOTE — PROGRESS NOTES
Infectious Disease Progress Note           Subjective:   Assessed pt at bedside. Doing well, denies new complaints, no acute events since last seen, left hip pain is better, denies back pain or discomfort   Objective:   Physical Exam:     BP (!) 107/51   Pulse 89   Temp 99 °F (37.2 °C) (Oral)   Resp 14   Ht 1.575 m (5' 2\")   Wt 70.3 kg (155 lb)   SpO2 100%   BMI 28.35 kg/m²    O2 Device: None (Room air)    Temp (24hrs), Av °F (37.2 °C), Min:98.8 °F (37.1 °C), Max:99.4 °F (37.4 °C)    No intake/output data recorded.    190 - 700  In: 4144.6 [P.O.:636; I.V.:1158.6]  Out:  [Urine:550; Drains:1200]    General: NAD, AAO x 4  HEENT: GIORGI, Moist mucosa   Lungs: CTA b/l, decreased at the bases, no wheeze/rhonchi   Heart: S1S2+, RRR, no murmur  Abdo: Soft, NT, ND, +BS   : No higginbotham cath   Exts left anterior hip dressing in place   Skin: lower back mid line wound w purulent drainage, no erythema or edema     Data Review:       Recent Days:    Recent Labs     24  0800 24  0632 24  0612   WBC 5.4 4.5 8.7   HGB 9.1* 9.1* 7.2*   HCT 31.0* 31.5* 24.5*   * 409* 400       Recent Labs     24  0800 24  0632 24  0612   BUN 11 11 8   CREATININE 0.47* 0.52* 0.48*         Lab Results   Component Value Date/Time    CRP 10.80 2024 06:12 AM     Microbiology     Results       Procedure Component Value Units Date/Time    Culture, Tissue [[id number]] Collected: 24 1319    Order Status: Completed Specimen: Tissue Updated: 24 3261     Special Requests No Special Requests        Gram stain Occasional WBCs seen         No organisms seen        Culture PENDING    Culture, Wound [[id number]] Collected: 24 1309    Order Status: Completed Specimen: Swab from Joint, Hip Updated: 24 1358     Special Requests No Special Requests        Gram stain Occasional WBCs seen         No organisms seen        Culture PENDING    Culture, Wound  Canceled Specimen: Blood     Culture, Blood, PCR ID Panel [[id number]]  (Abnormal) Collected: 02/28/24 1450    Order Status: Completed Specimen: Blood Updated: 02/29/24 1613     Accession Number Blood Culture Bottle        Enterococcus faecalis by PCR Not detected     Enterococcus faecium by PCR Not detected     Listeria monocytogenes by PCR Not detected     STAPHYLOCOCCUS Detected        Staphylococcus Aureus Not detected     Staphylococcus epidermidis by PCR Not detected     Staphylococcus lugdunensis by PCR Not detected     STREPTOCOCCUS Not detected     Streptococcus agalactiae (Group B) Not detected     Strep pneumoniae Not detected     Strep pyogenes,(Grp. A) Not detected     Acinetobacter calcoac baumannii complex by PCR Not detected     Bacteroides fragilis by PCR Not detected     Enterobacteriaceae by PCR Not detected     Enterobacter cloacae complex by PCR Not detected     Escherichia Coli Not detected     Klebsiella aerogenes by PCR Not detected     Klebsiella oxytoca by PCR Not detected     Klebsiella pneumoniae group by PCR Not detected     Proteus by PCR Not detected     Salmonella species by PCR Not detected     Serratia marcescens by PCR Not detected     Haemophilus Influenzae by PCR Not detected     Neisseria meningitidis by PCR Not detected     Pseudomonas aeruginosa Not detected     Stenotrophomonas maltophilia by PCR Not detected     Candida albicans by PCR Not detected     Candida auris by PCR Not detected     Candida glabrata Not detected     Candida krusei by PCR Not detected     Candida parapsilosis by PCR Not detected     Candida tropicalis by PCR Not detected     Cryptococcus neoformans/gattii by PCR Not detected     Resistant gene targets          [employer] test comment False positive results may rarely occur. Correlate with     Comment: clinical,epidemiologic,  and other laboratory findings  Please see BCID Interpretation Guide in EPIC Links         Blood Culture 1 [[id number]] Collected:

## 2024-03-05 NOTE — PROGRESS NOTES
gm    Anthropometric Measures:  Height: 157.5 cm (5' 2\")  Ideal Body Weight (IBW): 110 lbs (50 kg)       Current Body Weight: 77.9 kg (171 lb 11.2 oz) (3/5, RD obtained.), 156.1 % IBW. Weight Source: Bed Scale  Current BMI (kg/m2): 31.4  Usual Body Weight: 70.3 kg (155 lb)  % Weight Change (Calculated): 10.8  Weight Adjustment For: No Adjustment                 BMI Categories: Obese Class 1 (BMI 30.0-34.9)    Estimated Daily Nutrient Needs:  Energy Requirements Based On: Kcal/kg  Weight Used for Energy Requirements: Current  Energy (kcal/day): 1805-0525 kcal/d (25-30 kcal/kg, ERAS postop)  Weight Used for Protein Requirements: Current  Protein (g/day): 117 gm/d (1.5 gm/kg, ERAS postop)  Method Used for Fluid Requirements: 1 ml/kcal  Fluid (ml/day): 1948 mL/d    Nutrition Diagnosis:   Inadequate protein-energy intake related to pain as evidenced by intake 26-50%  Increased nutrient needs related to increase demand for energy/nutrients as evidenced by wounds    Nutrition Interventions:   Food and/or Nutrient Delivery: Continue Current Diet, Start Oral Nutrition Supplement  Nutrition Education/Counseling: No recommendation at this time  Coordination of Nutrition Care: Continue to monitor while inpatient  Plan of Care discussed with: RN, Pt, FNS Ambassador    Goals:     Goals: Meet at least 75% of estimated needs, PO intake 50% or greater, within 7 days       Nutrition Monitoring and Evaluation:   Behavioral-Environmental Outcomes: None Identified  Food/Nutrient Intake Outcomes: Diet Advancement/Tolerance, Food and Nutrient Intake, Supplement Intake  Physical Signs/Symptoms Outcomes: Biochemical Data, Meal Time Behavior, Weight, Skin    Discharge Planning:    Continue current diet     Yojana Roman RD  Contact: ext. [phone].

## 2024-03-05 NOTE — PLAN OF CARE
PHYSICAL THERAPY EVALUATION  Patient: Paula Renteria (53 y.o. female)  Date: 3/5/2024  Primary Diagnosis: Pyogenic arthritis of left hip, due to unspecified organism (HCC) [M00.9]  Pain of left hip [M25.552]  Procedure(s) (LRB):  DEBRIDEMENT LEFT HIP, GIRDLESTONE RESECTION LEFT HIP, ANTIBIOTIC CEMENT SPACER PLACEMENT LEFT HIP, ADDUCTOR TENOTOMY LEFT HIP (Left) 1 Day Post-Op   Precautions: Fall Risk, Contact Precautions, Weight Bearing, Bed Alarm     Left Lower Extremity Weight Bearing: Toe Touch Weight Bearing Partial Weight Bearing Percentage Or Pounds: 15 LBS              Recommendations for nursing mobility: Out of bed to chair for meals, Encourage HEP in prep for ADLs/mobility; see handout for details, Frequent repositioning to prevent skin breakdown, Use of bed/chair alarm for safety, Use of BSC for toileting , AD and gt belt for bed to chair , Alicia Stedy for bed to chair transfers , Assist x2, and TTWB on LLE    In place during session: Peripheral IV, External Catheter, and Wound vac x2    ASSESSMENT  Pt is a 53 y.o. female admitted on 2/28/2024 from orthopedic Dr's office for unclosed superior glute fissure; pt currently being treated for pyogenic arthritis of L hip, s/p 1) debridement L hip 2) girdlestone resection L hip, 3) abx cement non-articulating spacer placement L hip, 4) adductor tenotomy L hip. Pt semi supine upon PT arrival, agreeable to evaluation. Pt A&O x 4.      Based on the objective data described below, the patient currently presents with impaired functional mobility, decreased independence in ADLs, decreased ROM, impaired strength, decreased activity tolerance, impaired balance, and increased pain levels. (See below for objective details and assist levels).     Overall pt tolerated session fair today with c/o 5/10 pain at rest. Pt required Lindsey and add'l time for bed mobility, requires use of bed rails and PT assist for LLE support. Pt completed sit <> stand and stand-pivot bed > chair  hospital room?   [] 1   [x] 2   [] 3   [] 4   6.  Climbing 3-5 steps with a railing?   [x] 1   [] 2   [] 3   [] 4   © , Trustees of [facility], under license to [employer]. All rights reserved     Score:  Initial: 15/24 Most Recent: X (Date: 3/5/2024 )   Interpretation of Tool:  Represents activities that are increasingly more difficult (i.e. Bed mobility, Transfers, Gait).  Score 24 23 22-20 19-15 14-10 9-7 6   Modifier CH CI CJ CK CL CM CN         Physical Therapy Evaluation Charge Determination   History Examination Presentation Decision-Making   HIGH Complexity :3+ comorbidities / personal factors will impact the outcome/ POC  HIGH Complexity : 4+ Standardized tests and measures addressing body structure, function, activity limitation and / or participation in recreation  LOW Complexity : Stable, uncomplicated  Other outcome measures [facility] 6  MEDIUM      Based on the above components, the patient evaluation is determined to be of the following complexity level: LOW    Pain Ratin/10 L hip pain  Pain Intervention(s):   rest and repositioning, PCA pump    Activity Tolerance:   Fair     After treatment patient left in no apparent distress:   Bed locked and in lowest position Patient left in no apparent distress sitting up in chair and Call bell within reach and nsg updated.    COMMUNICATION/EDUCATION:   The patient’s plan of care was discussed with: Registered nurse    Patient Education  Education Given To: Patient  Education Provided: Role of Therapy;Plan of Care;Transfer Training;Equipment;Precautions  Education Method: Verbal;Demonstration  Barriers to Learning: None  Education Outcome: Verbalized understanding;Demonstrated understanding;Continued education needed         Thank you for this referral.  Maricruz Hurtado, PT  Minutes: 33

## 2024-03-05 NOTE — PROGRESS NOTES
.Progress Note (Hospitalist, Internal Medicine)  IDENTIFYING INFORMATION   PATIENT:  Paula Renteria  MRN:  [id number]  ADMIT DATE: 2/28/2024  TIME OF EVALUATION: 3/5/2024 5:05 PM      HISTORY OF PRESENT ILLNESS   Paula eRnteria is a 53 y.o. female who presents with       SUBJECTIVE     Pain control improved.  No leukocytosis.  She has remained afebrile.    MEDICATIONS   Medications Prior to Admission  Medications Prior to Admission: ondansetron (ZOFRAN) 4 MG tablet, Take 1 tablet by mouth every 8 hours as needed for Nausea or Vomiting  lidocaine 4 % external patch, Place 1 patch onto the skin daily  trolamine salicylate (ASPERCREME) 10 % cream, Apply topically as needed for Pain Apply topically as needed.  albuterol sulfate HFA (PROVENTIL;VENTOLIN;PROAIR) 108 (90 Base) MCG/ACT inhaler, INHALE TWO PUFFS BY MOUTH every 4 hours AS NEEDED  XANAX 0.5 MG tablet, Take 1 tablet by mouth nightly as needed.  docusate (COLACE, DULCOLAX) 100 MG CAPS, Take 100 mg by mouth daily  DULoxetine (CYMBALTA) 60 MG extended release capsule, Take 1 capsule by mouth 2 times daily  oxyCODONE (ROXICODONE) 5 MG immediate release tablet, Take 1 tablet by mouth every 4 hours as needed for Pain.  OXcarbazepine (TRILEPTAL) 150 MG tablet, Take 100 mg by mouth 3 times daily  baclofen (LIORESAL) 10 MG tablet, Take 5 tablets by mouth 2 times daily (Patient not taking: Reported on 2/15/2024)  famotidine (PEPCID) 20 MG tablet, Take 3 tablets by mouth 2 times daily  levothyroxine (SYNTHROID) 100 MCG tablet, Take 1 tablet by mouth Daily    Current Medications  Current Facility-Administered Medications   Medication Dose Route Frequency Provider Last Rate Last Admin    diphenhydrAMINE (BENADRYL) injection 25 mg  25 mg IntraVENous Once Dc Griffiths Jr., MD        0.9 % sodium chloride infusion   IntraVENous PRN Dc Griffiths Jr., MD        [START ON 3/7/2024] Vancomycin level- please draw level on 3/7 @0600 with AM labs   Other RX Placeholder Christiano Jolly MD         naloxone 0.4 mg in 10 mL sodium chloride syringe   IntraVENous PRN Haris Ramirez MD        morphine 1 mg/mL PCA   IntraVENous Continuous Haris Ramriez MD   New Bag at 03/05/24 1438    multivitamin 1 tablet  1 tablet Oral Daily Haris Ramirez MD   1 tablet at 03/05/24 0924    ascorbic acid (VITAMIN C) tablet 1,000 mg  1,000 mg Oral BID Haris Ramirez MD   1,000 mg at 03/05/24 0925    lactobacillus (CULTURELLE) capsule 1 capsule  1 capsule Oral BID Haris Ramirez MD   1 capsule at 03/05/24 0924    carvedilol (COREG) tablet 6.25 mg  6.25 mg Oral BID  Merna Bateman PA-C   6.25 mg at 03/05/24 0923    [Held by provider] HYDROmorphone HCl PF (DILAUDID) injection 1 mg  1 mg IntraVENous Q4H PRN Merna Bateman PA-C   1 mg at 03/04/24 0505    [Held by provider] oxyCODONE-acetaminophen (PERCOCET) 5-325 MG per tablet 1 tablet  1 tablet Oral Q4H PRN Merna Bateman PA-C   1 tablet at 03/03/24 2142    metroNIDAZOLE (FLAGYL) 500 mg in 0.9% NaCl 100 mL IVPB premix  500 mg IntraVENous Q8H Promise Garrison MD   Stopped at 03/05/24 1220    hydrALAZINE (APRESOLINE) injection 10 mg  10 mg IntraVENous Q6H PRN Merna Bateman PA-C   10 mg at 03/03/24 0211    vancomycin (VANCOCIN) 1,000 mg in sodium chloride 0.9 % 250 mL IVPB (Shny9Msp)  1,000 mg IntraVENous Q12H Maddi Doe MD   Stopped at 03/05/24 1059    nicotine (NICODERM CQ) 14 MG/24HR 1 patch  1 patch TransDERmal Daily Merna Bateman PA-C   1 patch at 03/05/24 0925    OXcarbazepine (TRILEPTAL) tablet 300 mg  300 mg Oral TID  Merna Bateman PA-C   300 mg at 03/05/24 1334    OXcarbazepine (TRILEPTAL) tablet 300 mg  300 mg Oral Daily with breakfast Merna Bateman PA-C   300 mg at 03/05/24 0924    cefTAZidime-avibactam (AVYCAZ) 2.5 g in sodium chloride 0.9 % 100 mL IVPB  2.5 g IntraVENous Q8H Promise Garrison MD 50 mL/hr at 03/05/24 1621 2.5 g at 03/05/24 1621    diphenhydrAMINE (BENADRYL) capsule 25 mg  25 mg

## 2024-03-06 PROBLEM — M25.552 PAIN OF LEFT HIP: Status: ACTIVE | Noted: 2024-03-06

## 2024-03-06 PROBLEM — M01.X59: Status: ACTIVE | Noted: 2024-03-06

## 2024-03-06 PROBLEM — M46.26 OSTEOMYELITIS OF LOW BACK (HCC): Status: ACTIVE | Noted: 2024-03-06

## 2024-03-06 PROBLEM — M86.68 CHRONIC OSTEOMYELITIS OF HIP (HCC): Status: ACTIVE | Noted: 2024-03-06

## 2024-03-06 LAB
ANION GAP SERPL CALC-SCNC: 3 MMOL/L (ref 5–15)
BACTERIA SPEC CULT: NORMAL
BASOPHILS # BLD: 0 K/UL (ref 0–0.1)
BASOPHILS NFR BLD: 0 % (ref 0–1)
BUN SERPL-MCNC: 11 MG/DL (ref 6–20)
BUN/CREAT SERPL: 27 (ref 12–20)
CA-I BLD-MCNC: 8.5 MG/DL (ref 8.5–10.1)
CHLORIDE SERPL-SCNC: 109 MMOL/L (ref 97–108)
CO2 SERPL-SCNC: 27 MMOL/L (ref 21–32)
CREAT SERPL-MCNC: 0.41 MG/DL (ref 0.55–1.02)
DIFFERENTIAL METHOD BLD: ABNORMAL
EOSINOPHIL # BLD: 0.2 K/UL (ref 0–0.4)
EOSINOPHIL NFR BLD: 2 % (ref 0–7)
ERYTHROCYTE [DISTWIDTH] IN BLOOD BY AUTOMATED COUNT: 19.5 % (ref 11.5–14.5)
FERRITIN SERPL-MCNC: 35 NG/ML (ref 8–252)
GLUCOSE SERPL-MCNC: 112 MG/DL (ref 65–100)
GRAM STN SPEC: NORMAL
HCT VFR BLD AUTO: 22.3 % (ref 35–47)
HGB BLD-MCNC: 6.6 G/DL (ref 11.5–16)
IMM GRANULOCYTES # BLD AUTO: 0 K/UL
IMM GRANULOCYTES NFR BLD AUTO: 0 %
IRON SATN MFR SERPL: 7 % (ref 20–50)
IRON SERPL-MCNC: 19 UG/DL (ref 35–150)
LYMPHOCYTES # BLD: 1.2 K/UL (ref 0.8–3.5)
LYMPHOCYTES NFR BLD: 14 % (ref 12–49)
Lab: NORMAL
MCH RBC QN AUTO: 22.6 PG (ref 26–34)
MCHC RBC AUTO-ENTMCNC: 29.6 G/DL (ref 30–36.5)
MCV RBC AUTO: 76.4 FL (ref 80–99)
MONOCYTES # BLD: 1.1 K/UL (ref 0–1)
MONOCYTES NFR BLD: 12 % (ref 5–13)
MYELOCYTES NFR BLD MANUAL: 1 %
NEUTS SEG # BLD: 6.3 K/UL (ref 1.8–8)
NEUTS SEG NFR BLD: 71 % (ref 32–75)
NRBC # BLD: 0 K/UL (ref 0–0.01)
NRBC BLD-RTO: 0 PER 100 WBC
PLATELET # BLD AUTO: 384 K/UL (ref 150–400)
PMV BLD AUTO: 9.4 FL (ref 8.9–12.9)
POTASSIUM SERPL-SCNC: 3.9 MMOL/L (ref 3.5–5.1)
RBC # BLD AUTO: 2.92 M/UL (ref 3.8–5.2)
RBC MORPH BLD: ABNORMAL
SODIUM SERPL-SCNC: 139 MMOL/L (ref 136–145)
TIBC SERPL-MCNC: 257 UG/DL (ref 250–450)
WBC # BLD AUTO: 8.9 K/UL (ref 3.6–11)

## 2024-03-06 PROCEDURE — 6370000000 HC RX 637 (ALT 250 FOR IP): Performed by: INTERNAL MEDICINE

## 2024-03-06 PROCEDURE — 6370000000 HC RX 637 (ALT 250 FOR IP): Performed by: ORTHOPAEDIC SURGERY

## 2024-03-06 PROCEDURE — 99232 SBSQ HOSP IP/OBS MODERATE 35: CPT | Performed by: INTERNAL MEDICINE

## 2024-03-06 PROCEDURE — 2580000003 HC RX 258: Performed by: INTERNAL MEDICINE

## 2024-03-06 PROCEDURE — 30233N1 TRANSFUSION OF NONAUTOLOGOUS RED BLOOD CELLS INTO PERIPHERAL VEIN, PERCUTANEOUS APPROACH: ICD-10-PCS | Performed by: INTERNAL MEDICINE

## 2024-03-06 PROCEDURE — 6360000002 HC RX W HCPCS: Performed by: ORTHOPAEDIC SURGERY

## 2024-03-06 PROCEDURE — 36430 TRANSFUSION BLD/BLD COMPNT: CPT

## 2024-03-06 PROCEDURE — 85025 COMPLETE CBC W/AUTO DIFF WBC: CPT

## 2024-03-06 PROCEDURE — 97530 THERAPEUTIC ACTIVITIES: CPT

## 2024-03-06 PROCEDURE — 36415 COLL VENOUS BLD VENIPUNCTURE: CPT

## 2024-03-06 PROCEDURE — 1100000000 HC RM PRIVATE

## 2024-03-06 PROCEDURE — 6360000002 HC RX W HCPCS: Performed by: INTERNAL MEDICINE

## 2024-03-06 PROCEDURE — 99024 POSTOP FOLLOW-UP VISIT: CPT

## 2024-03-06 PROCEDURE — 6370000000 HC RX 637 (ALT 250 FOR IP)

## 2024-03-06 PROCEDURE — 2580000003 HC RX 258

## 2024-03-06 PROCEDURE — 97166 OT EVAL MOD COMPLEX 45 MIN: CPT

## 2024-03-06 PROCEDURE — P9016 RBC LEUKOCYTES REDUCED: HCPCS

## 2024-03-06 PROCEDURE — 80048 BASIC METABOLIC PNL TOTAL CA: CPT

## 2024-03-06 RX ORDER — FERROUS SULFATE 325(65) MG
325 TABLET ORAL 2 TIMES DAILY WITH MEALS
Status: DISCONTINUED | OUTPATIENT
Start: 2024-03-06 | End: 2024-03-13 | Stop reason: HOSPADM

## 2024-03-06 RX ORDER — SODIUM CHLORIDE 9 MG/ML
INJECTION, SOLUTION INTRAVENOUS PRN
Status: DISCONTINUED | OUTPATIENT
Start: 2024-03-06 | End: 2024-03-09 | Stop reason: SDUPTHER

## 2024-03-06 RX ORDER — ACETAMINOPHEN 650 MG/1
650 SUPPOSITORY RECTAL EVERY 8 HOURS SCHEDULED
Status: DISCONTINUED | OUTPATIENT
Start: 2024-03-06 | End: 2024-03-09 | Stop reason: SDUPTHER

## 2024-03-06 RX ORDER — ACETAMINOPHEN 500 MG
1000 TABLET ORAL EVERY 8 HOURS SCHEDULED
Status: DISCONTINUED | OUTPATIENT
Start: 2024-03-06 | End: 2024-03-09 | Stop reason: SDUPTHER

## 2024-03-06 RX ADMIN — BACLOFEN 20 MG: 10 TABLET ORAL at 22:50

## 2024-03-06 RX ADMIN — BACLOFEN 20 MG: 10 TABLET ORAL at 12:05

## 2024-03-06 RX ADMIN — SODIUM CHLORIDE: 9 INJECTION, SOLUTION INTRAVENOUS at 22:56

## 2024-03-06 RX ADMIN — OXYCODONE HYDROCHLORIDE AND ACETAMINOPHEN 1000 MG: 500 TABLET ORAL at 12:05

## 2024-03-06 RX ADMIN — Medication 1 CAPSULE: at 12:05

## 2024-03-06 RX ADMIN — THERA TABS 1 TABLET: TAB at 12:05

## 2024-03-06 RX ADMIN — VANCOMYCIN HYDROCHLORIDE 1000 MG: 1 INJECTION, POWDER, LYOPHILIZED, FOR SOLUTION INTRAVENOUS at 22:56

## 2024-03-06 RX ADMIN — FERROUS SULFATE TAB 325 MG (65 MG ELEMENTAL FE) 325 MG: 325 (65 FE) TAB at 17:24

## 2024-03-06 RX ADMIN — CARVEDILOL 6.25 MG: 3.12 TABLET, FILM COATED ORAL at 17:24

## 2024-03-06 RX ADMIN — CEFTAZIDIME, AVIBACTAM 2.5 G: 2; .5 POWDER, FOR SOLUTION INTRAVENOUS at 00:53

## 2024-03-06 RX ADMIN — DULOXETINE HYDROCHLORIDE 60 MG: 30 CAPSULE, DELAYED RELEASE ORAL at 12:05

## 2024-03-06 RX ADMIN — ACETAMINOPHEN 1000 MG: 500 TABLET ORAL at 22:50

## 2024-03-06 RX ADMIN — Medication: at 19:32

## 2024-03-06 RX ADMIN — OXYCODONE HYDROCHLORIDE AND ACETAMINOPHEN 1000 MG: 500 TABLET ORAL at 22:50

## 2024-03-06 RX ADMIN — LEVOTHYROXINE SODIUM 100 MCG: 0.07 TABLET ORAL at 06:14

## 2024-03-06 RX ADMIN — METRONIDAZOLE 500 MG: 500 INJECTION, SOLUTION INTRAVENOUS at 00:54

## 2024-03-06 RX ADMIN — METRONIDAZOLE 500 MG: 500 INJECTION, SOLUTION INTRAVENOUS at 17:25

## 2024-03-06 RX ADMIN — VANCOMYCIN HYDROCHLORIDE 1000 MG: 1 INJECTION, POWDER, LYOPHILIZED, FOR SOLUTION INTRAVENOUS at 12:18

## 2024-03-06 RX ADMIN — OXCARBAZEPINE 300 MG: 300 TABLET, FILM COATED ORAL at 12:06

## 2024-03-06 RX ADMIN — Medication 1 CAPSULE: at 22:50

## 2024-03-06 RX ADMIN — CARVEDILOL 6.25 MG: 3.12 TABLET, FILM COATED ORAL at 12:05

## 2024-03-06 RX ADMIN — METRONIDAZOLE 500 MG: 500 INJECTION, SOLUTION INTRAVENOUS at 12:19

## 2024-03-06 RX ADMIN — DIPHENHYDRAMINE HYDROCHLORIDE 25 MG: 25 CAPSULE ORAL at 13:47

## 2024-03-06 RX ADMIN — ACETAMINOPHEN 1000 MG: 500 TABLET ORAL at 14:50

## 2024-03-06 RX ADMIN — SODIUM CHLORIDE, PRESERVATIVE FREE 10 ML: 5 INJECTION INTRAVENOUS at 12:05

## 2024-03-06 RX ADMIN — OXCARBAZEPINE 300 MG: 300 TABLET, FILM COATED ORAL at 18:14

## 2024-03-06 RX ADMIN — DULOXETINE HYDROCHLORIDE 60 MG: 30 CAPSULE, DELAYED RELEASE ORAL at 22:50

## 2024-03-06 RX ADMIN — OXCARBAZEPINE 300 MG: 300 TABLET, FILM COATED ORAL at 13:46

## 2024-03-06 RX ADMIN — CEFTAZIDIME, AVIBACTAM 2.5 G: 2; .5 POWDER, FOR SOLUTION INTRAVENOUS at 13:47

## 2024-03-06 RX ADMIN — OXCARBAZEPINE 300 MG: 300 TABLET, FILM COATED ORAL at 18:17

## 2024-03-06 ASSESSMENT — PAIN DESCRIPTION - ORIENTATION
ORIENTATION: LOWER
ORIENTATION: LOWER

## 2024-03-06 ASSESSMENT — PAIN DESCRIPTION - LOCATION
LOCATION: HIP;BACK
LOCATION: BACK;KNEE

## 2024-03-06 ASSESSMENT — PAIN DESCRIPTION - DESCRIPTORS
DESCRIPTORS: THROBBING
DESCRIPTORS: STABBING

## 2024-03-06 ASSESSMENT — PAIN SCALES - GENERAL
PAINLEVEL_OUTOF10: 7
PAINLEVEL_OUTOF10: 7
PAINLEVEL_OUTOF10: 8

## 2024-03-06 NOTE — PLAN OF CARE
OCCUPATIONAL THERAPY EVALUATION  Patient: Paula Renteria (53 y.o. female)  Date: 3/6/2024  Primary Diagnosis: Pyogenic arthritis of left hip, due to unspecified organism (HCC) [M00.9]  Pain of left hip [M25.552]  Procedure(s) (LRB):  DEBRIDEMENT LEFT HIP, GIRDLESTONE RESECTION LEFT HIP, ANTIBIOTIC CEMENT SPACER PLACEMENT LEFT HIP, ADDUCTOR TENOTOMY LEFT HIP (Left) 2 Days Post-Op   Precautions: Fall Risk, Contact Precautions, Weight Bearing, Bed Alarm   Left Lower Extremity Weight Bearing: Toe Touch Weight Bearing            Recommendations for nursing mobility: Out of bed to chair for meals, Encourage HEP in prep for ADLs/mobility; see handout for details, Frequent repositioning to prevent skin breakdown, Use of bed/chair alarm for safety, Use of BSC for toileting , AD and gt belt for bed to chair , and Assist x1    In place during session:Peripheral IV and Wound vac x2 on left hip and sacral area  ASSESSMENT  Pt is a 53 y.o. female [facility] with c/o worsening left hip pain, admitted 2/28/24 and currently being treated for pyogenic arthritis of left hip, osteomyelitis of lumbar spine, depression/bipolar disorder, anxiety.  PMHx significant for several lumbar fusion surgeries, most recently a fusion in 2022 that required several washout surgeries and has had an open wound since.  Patient admitted today because her orthopedic doctor James recommended her to come to the ER.  Patient has a wound VAC on her lower back where she has a fissure superior to her gluteal cleft that has not closed.  She has had a recent debridement by Dr. Lopez and an outpatient appointment with Dr. Garrison for IV antibiotics.  Patient's been having left hip pain on and off since January 2023. . Pt sitting on bed side commode in room upon arrival, AXO x4, and agreeable to OT evaluation.     Based on current observations, pt presents with decreased  functional mobility, independence in ADLs, high-level IADLs, ROM, strength, body

## 2024-03-06 NOTE — PROGRESS NOTES
Infectious Disease Progress Note           Subjective:   No change in clinical status, remains stable, reported left hip pain during my assessment. Pt had a BM today. PRBC transfusion ordered for hgb of 6.6  Objective:   Physical Exam:     BP (!) 109/59   Pulse 53   Temp 98.4 °F (36.9 °C) (Oral)   Resp 14   Ht 1.575 m (5' 2\")   Wt 70.3 kg (155 lb)   SpO2 100%   BMI 28.35 kg/m²    O2 Device: None (Room air)    Temp (24hrs), Av.4 °F (36.9 °C), Min:98.1 °F (36.7 °C), Max:98.6 °F (37 °C)    No intake/output data recorded.    1901 -  0700  In: 2798 [P.O.:340; I.V.:208]  Out: 2250 [Urine:1750; Drains:500]    General: NAD, AAO x 4  HEENT: GIORGI, Moist mucosa   Lungs: decreased at the bases, no wheeze/rhonchi   Heart: S1S2+, RRR, no murmur  Abdo: Soft, NT, ND, +BS   : No higginbotham cath   Exts left anterior hip dressing in place   Skin: Wound Vac on lower back     Data Review:       Recent Days:    Recent Labs     24  0632 24  0612 24  0652   WBC 4.5 8.7 8.9   HGB 9.1* 7.2* 6.6*   HCT 31.5* 24.5* 22.3*   * 400 384       Recent Labs     24  0632 24  0612 24  0652   BUN 11 8 11   CREATININE 0.52* 0.48* 0.41*         Lab Results   Component Value Date/Time    CRP 10.80 2024 06:12 AM     Microbiology     Results       Procedure Component Value Units Date/Time    Culture, Tissue [[id number]] Collected: 24 1319    Order Status: Completed Specimen: Tissue Updated: 24 0656     Special Requests No Special Requests        Gram stain Occasional WBCs seen         No organisms seen        Culture       No growth thus far, holding 14 days.          Culture, Wound [[id number]] Collected: 24 1309    Order Status: Completed Specimen: Swab from Joint, Hip Updated: 24 0648     Special Requests No Special Requests        Gram stain Occasional WBCs seen         No organisms seen        Culture       No growth thus far, holding 14 days.  targets          [employer] test comment False positive results may rarely occur. Correlate with     Comment: clinical,epidemiologic,  and other laboratory findings  Please see BCID Interpretation Guide in EPIC Links         Blood Culture 1 [[id number]] Collected: 02/28/24 1447    Order Status: Completed Specimen: Blood Updated: 03/05/24 2010     Special Requests --        No Special Requests  Left       Culture No growth 6 days       Blood Culture 2 [[id number]]  (Abnormal) Collected: 02/28/24 1447    Order Status: Completed Specimen: Blood Updated: 03/01/24 1504     Special Requests No Special Requests        Culture       Staphylococcus species, coagulase negative growing in 1 of 2 bottles drawn No Site Indicated            (NOTE) [facility] CL CLT DEBORAH BLOCKER RN [id number] 2/29/24 LITA             Diagnostic   CT PELVIS W WO CONTRAST Additional Contrast? None    Result Date: 2/29/2024  1. Findings compatible with chronic osteomyelitis and septic arthritis of the left hip with collapse of the left femoral head. Diffuse osteosclerosis of the left hemipelvis can be seen with chronic osteomyelitis 2. Loosening of the hardware in the L3 L4 L5 and left pelvis 3. Large decubitus wound overlying the lumbosacral junction extending to the underlying bone. A drainable fluid collection is not demonstrated        Assessment/Plan     Septic arthritis/Chronic osteomyelitis involving left hip, admitted for debridement         Chronic osteomyelitis and septic arthritis of the left hip with collapse of the left femoral head on CT (02/29)        Underwent left hip surgery on 03/04, intra-op Cxs pending, no organisms on Gram stain        Remains afebrile w a normal WBC on routine labs, CRP 10.80        Continue on vanc and Avycaz. SNF will not accept pt on Avycaz per CM        If intra-op Cxs remain neg will consider Cefepime and vanc upon d/c for the remainder of therapy          2. Staph species bacteremia, isolated from 1/2, extensive

## 2024-03-06 NOTE — PLAN OF CARE
PHYSICAL THERAPY TREATMENT     Patient: Paula Renteria (53 y.o. female)  Date: 3/6/2024  Diagnosis: Pyogenic arthritis of left hip, due to unspecified organism (HCC) [M00.9]  Pain of left hip [M25.552] Pyogenic arthritis of left hip, due to unspecified organism (HCC)  Procedure(s) (LRB):  DEBRIDEMENT LEFT HIP, GIRDLESTONE RESECTION LEFT HIP, ANTIBIOTIC CEMENT SPACER PLACEMENT LEFT HIP, ADDUCTOR TENOTOMY LEFT HIP (Left) 2 Days Post-Op  Precautions: Fall Risk, Contact Precautions, Weight Bearing, Bed Alarm     Left Lower Extremity Weight Bearing: Toe Touch Weight Bearing Partial Weight Bearing Percentage Or Pounds: 15 LBS              Recommendations for nursing mobility: Out of bed to chair for meals, Encourage HEP in prep for ADLs/mobility; see handout for details, Use of bed/chair alarm for safety, Use of BSC for toileting , AD and gt belt for bed to chair , Alicia Stedy for bed to chair transfers , Assist x2, and TTWB on LLE    In place during session: PICC line, External Catheter, and Wound vac x2  Chart, physical therapy assessment, plan of care and goals were reviewed.  ASSESSMENT  Patient continues with skilled PT services and is progressing towards goals. Pt seated on BSC upon PT arrival, had completed bed > BSC independently, pt states that she called for nsg but no one came, agreeable to session. Pt A&O x 4. (See below for objective details and assist levels). Of note, pt hgb 6.6 @ 06:52 and has been down trending for last 48h.      Overall pt tolerated session fair today with c/o L hip and back pain with all OOB mobility. Pt required minAx2 for all transfers and bed mobility, requires cueing for sequencing and safety awareness with lines. Pt requires verbal cues for maintaining 15# (TTWB) precaations LLE. Pt returned to bed with call bell in reach and positioned with pillows supporting L hip and R trunk.  Will continue to benefit from skilled PT services, and will continue to progress as tolerated.  cues;Safety awareness training;Weight shifting training/pressure relief  Sit to Supine: Minimum assistance;Assist X2;Additional time  Scooting: Minimum assistance;Assist X2;Additional time  Transfers:  Transfer Training  Transfer Training: Yes  Interventions: Verbal cues;Safety awareness training;Weight shifting training/pressure relief;Visual cues  Sit to Stand: Minimum assistance;Assist X1;Additional time  Stand to Sit: Minimum assistance;Assist X1;Additional time  Stand Pivot Transfers: Minimum assistance;Assist X2;Assist X1;Adaptive equipment;Additional time  Bed to Chair: Minimum assistance;Assist X1;Additional time;Adaptive equipment  Toilet Transfer: Minimum assistance;Assist X1;Additional time  Balance:  Balance  Sitting: Impaired  Sitting - Static: Good (unsupported)  Sitting - Dynamic: Fair (occasional)  Standing: Impaired  Standing - Static: Constant support;Fair  Standing - Dynamic: Constant support;Poor  Wheelchair Mobility:     Ambulation/Gait Training:     Gait Training: No                                                 Pain Ratin-5/10 FACES with mobility  Pain Intervention(s):   nursing notified    Activity Tolerance:   Fair     After treatment patient left in no apparent distress:   Bed locked and returned to lowest position, Patient left in no apparent distress in bed, Call bell within reach, Bed/ chair alarm activated, and Side rails x3, and nsg updated     COMMUNICATION/COLLABORATION:   The patient’s plan of care was discussed with: Occupational therapist and Registered nurse    Patient Education  Education Given To: Patient  Education Provided: Role of Therapy;Plan of Care;Transfer Training;Equipment;Precautions  Education Method: Verbal;Demonstration  Barriers to Learning: None  Education Outcome: Verbalized understanding;Demonstrated understanding;Continued education needed    PT/OT sessions occurred together for increased safety of pt and clinician.      Maricruz Hurtado PT  Minutes:  25

## 2024-03-06 NOTE — PROGRESS NOTES
.Progress Note (Hospitalist, Internal Medicine)  IDENTIFYING INFORMATION   PATIENT:  Paula Renteria  MRN:  [id number]  ADMIT DATE: 2/28/2024  TIME OF EVALUATION: 3/6/2024 5:01 PM      HISTORY OF PRESENT ILLNESS   Paula Renteria is a 53 y.o. female who presents with       SUBJECTIVE     Pain control improved.  No leukocytosis.  Severe anemia overnight.  Transfuse with PRBC.    MEDICATIONS   Medications Prior to Admission  Medications Prior to Admission: ondansetron (ZOFRAN) 4 MG tablet, Take 1 tablet by mouth every 8 hours as needed for Nausea or Vomiting  lidocaine 4 % external patch, Place 1 patch onto the skin daily  trolamine salicylate (ASPERCREME) 10 % cream, Apply topically as needed for Pain Apply topically as needed.  albuterol sulfate HFA (PROVENTIL;VENTOLIN;PROAIR) 108 (90 Base) MCG/ACT inhaler, INHALE TWO PUFFS BY MOUTH every 4 hours AS NEEDED  XANAX 0.5 MG tablet, Take 1 tablet by mouth nightly as needed.  docusate (COLACE, DULCOLAX) 100 MG CAPS, Take 100 mg by mouth daily  DULoxetine (CYMBALTA) 60 MG extended release capsule, Take 1 capsule by mouth 2 times daily  oxyCODONE (ROXICODONE) 5 MG immediate release tablet, Take 1 tablet by mouth every 4 hours as needed for Pain.  OXcarbazepine (TRILEPTAL) 150 MG tablet, Take 100 mg by mouth 3 times daily  baclofen (LIORESAL) 10 MG tablet, Take 5 tablets by mouth 2 times daily (Patient not taking: Reported on 2/15/2024)  famotidine (PEPCID) 20 MG tablet, Take 3 tablets by mouth 2 times daily  levothyroxine (SYNTHROID) 100 MCG tablet, Take 1 tablet by mouth Daily    Current Medications  Current Facility-Administered Medications   Medication Dose Route Frequency Provider Last Rate Last Admin    vancomycin (VANCOCIN) 1,000 mg in sodium chloride 0.9 % 250 mL IVPB (Mehe2Vql)  1,000 mg IntraVENous Q8H Promise Garrison MD   Stopped at 03/06/24 1354    [START ON 3/7/2024] Vancomycin Trough: Please draw level on 3/7 at 1100  1 each Other Once Promise Garrison MD         ferrous sulfate (IRON 325) tablet 325 mg  325 mg Oral BID  Rachele Chambers PA-C        acetaminophen (TYLENOL) tablet 1,000 mg  1,000 mg Oral 3 times per day Rachele Chambers PA-C   1,000 mg at 03/06/24 1450    Or    acetaminophen (TYLENOL) suppository 650 mg  650 mg Rectal 3 times per day Rachele Chambers PA-C        0.9 % sodium chloride infusion   IntraVENous PRN Christiano Jolly MD        diphenhydrAMINE (BENADRYL) injection 25 mg  25 mg IntraVENous Once Dc Griffiths Jr., MD        0.9 % sodium chloride infusion   IntraVENous PRN Dc Griffiths Jr., MD        naloxone 0.4 mg in 10 mL sodium chloride syringe   IntraVENous PRN Haris Ramirez MD        morphine 1 mg/mL PCA   IntraVENous Continuous Haris Ramirez MD   New Bag at 03/05/24 1438    multivitamin 1 tablet  1 tablet Oral Daily Haris Ramirez MD   1 tablet at 03/06/24 1205    ascorbic acid (VITAMIN C) tablet 1,000 mg  1,000 mg Oral BID Haris Ramirez MD   1,000 mg at 03/06/24 1205    lactobacillus (CULTURELLE) capsule 1 capsule  1 capsule Oral BID Haris Ramirez MD   1 capsule at 03/06/24 1205    carvedilol (COREG) tablet 6.25 mg  6.25 mg Oral BID  Merna Bateman PA-C   6.25 mg at 03/06/24 1205    metroNIDAZOLE (FLAGYL) 500 mg in 0.9% NaCl 100 mL IVPB premix  500 mg IntraVENous Q8H Promise Garrison MD   Stopped at 03/06/24 1354    hydrALAZINE (APRESOLINE) injection 10 mg  10 mg IntraVENous Q6H PRN Merna Bateman PA-C   10 mg at 03/03/24 0211    nicotine (NICODERM CQ) 14 MG/24HR 1 patch  1 patch TransDERmal Daily Merna Bateman PA-C   1 patch at 03/06/24 1209    OXcarbazepine (TRILEPTAL) tablet 300 mg  300 mg Oral TID  Merna Bateman PA-C   300 mg at 03/06/24 1346    OXcarbazepine (TRILEPTAL) tablet 300 mg  300 mg Oral Daily with breakfast Merna Bateman PA-C   300 mg at 03/05/24 0924    cefTAZidime-avibactam (AVYCAZ) 2.5 g in sodium chloride 0.9 % 100 mL IVPB  2.5 g IntraVENous Q8H Bladimir  components found for: \"GRNLOCTYABS\"  Last 3 Platelets  No results found for: \"PLATELET\"  Chemistry  [unfilled]  [unfilled]  No results found for: \"LDH\"  Coagulation Studies  No results found for: \"PTT\", \"INR\"  Liver Function Studies  Lab Results   Component Value Date/Time    ALT 32 02/28/2024 02:47 PM    AST 32 02/28/2024 02:47 PM    ALKPHOS 289 02/28/2024 02:47 PM       Recent Imaging        Relevant labs and imaging reviewed    ASSESSMENT AND PLAN     Septic arthritis/chronic osteomyelitis involving the left hip  She is s/p left hip debridement  ID on board.  Patient on vancomycin and Avycaz  Follow-up on cultures  She also had IntraOp antibiotic cement not articulating spacer placed in the left hip    Staph species bacteremia  isolated from 1/2, extensive hardware in the spine       Repeat blood Cx from 03/01 is neg, continue on Vanc for now   Chronic lower back infection, underlying hardware w draining fistula      Repeated isolation of P.aeruginosa from wound Cx, most recently on 02/12, Bacteroides species also isolated       Continue on Avycaz and metronidazole. Local wound care    Microcytic anemia  Severe iron deficiency anemia  Transfused with PRBC  Initiated iron supplementation therapy  Outpatient colonoscopy recommendation      Christiano Jolly  [facility] Hospitalist, Internal Medicine  3/6/2024 at 5:01 PM

## 2024-03-06 NOTE — CARE COORDINATION
Talked with patient about discharge planning and recommendations for Snf. Patient does want to go to [facility] to get some rehab. Explained that if ID is discharging with the Avycaz antibiotic we may not be able to do SNF due to cost in facility. List given to patient with explanation for rating system and local areas. She is going to discuss with family. Will follow up tomorrow for choice.

## 2024-03-07 LAB
ABO + RH BLD: NORMAL
ANION GAP SERPL CALC-SCNC: 5 MMOL/L (ref 5–15)
BACTERIA SPEC CULT: NORMAL
BASOPHILS # BLD: 0 K/UL (ref 0–0.1)
BASOPHILS NFR BLD: 1 % (ref 0–1)
BLD PROD TYP BPU: NORMAL
BLOOD BANK BLOOD PRODUCT EXPIRATION DATE: NORMAL
BLOOD BANK DISPENSE STATUS: NORMAL
BLOOD BANK ISBT PRODUCT BLOOD TYPE: 5100
BLOOD BANK UNIT TYPE AND RH: NORMAL
BLOOD GROUP ANTIBODIES SERPL: NEGATIVE
BPU ID: NORMAL
BUN SERPL-MCNC: 9 MG/DL (ref 6–20)
BUN/CREAT SERPL: 23 (ref 12–20)
CA-I BLD-MCNC: 8.6 MG/DL (ref 8.5–10.1)
CHLORIDE SERPL-SCNC: 109 MMOL/L (ref 97–108)
CO2 SERPL-SCNC: 26 MMOL/L (ref 21–32)
CREAT SERPL-MCNC: 0.4 MG/DL (ref 0.55–1.02)
CROSSMATCH RESULT: NORMAL
DIFFERENTIAL METHOD BLD: ABNORMAL
EOSINOPHIL # BLD: 0.3 K/UL (ref 0–0.4)
EOSINOPHIL NFR BLD: 4 % (ref 0–7)
ERYTHROCYTE [DISTWIDTH] IN BLOOD BY AUTOMATED COUNT: 19.1 % (ref 11.5–14.5)
GLUCOSE SERPL-MCNC: 95 MG/DL (ref 65–100)
HCT VFR BLD AUTO: 22.8 % (ref 35–47)
HGB BLD-MCNC: 7 G/DL (ref 11.5–16)
IMM GRANULOCYTES # BLD AUTO: 0.1 K/UL (ref 0–0.04)
IMM GRANULOCYTES NFR BLD AUTO: 1 % (ref 0–0.5)
LYMPHOCYTES # BLD: 1.6 K/UL (ref 0.8–3.5)
LYMPHOCYTES NFR BLD: 24 % (ref 12–49)
Lab: NORMAL
MCH RBC QN AUTO: 23.7 PG (ref 26–34)
MCHC RBC AUTO-ENTMCNC: 30.7 G/DL (ref 30–36.5)
MCV RBC AUTO: 77.3 FL (ref 80–99)
MONOCYTES # BLD: 0.8 K/UL (ref 0–1)
MONOCYTES NFR BLD: 11 % (ref 5–13)
NEUTS SEG # BLD: 3.9 K/UL (ref 1.8–8)
NEUTS SEG NFR BLD: 59 % (ref 32–75)
NRBC # BLD: 0 K/UL (ref 0–0.01)
NRBC BLD-RTO: 0 PER 100 WBC
PLATELET # BLD AUTO: 340 K/UL (ref 150–400)
PMV BLD AUTO: 9.3 FL (ref 8.9–12.9)
POTASSIUM SERPL-SCNC: 4 MMOL/L (ref 3.5–5.1)
RBC # BLD AUTO: 2.95 M/UL (ref 3.8–5.2)
SODIUM SERPL-SCNC: 140 MMOL/L (ref 136–145)
SPECIMEN EXP DATE BLD: NORMAL
TRANSFUSION STATUS PATIENT QL: NORMAL
UNIT DIVISION: 0
UNIT ISSUE DATE/TIME: NORMAL
VANCOMYCIN SERPL-MCNC: 11.4 UG/ML
WBC # BLD AUTO: 6.6 K/UL (ref 3.6–11)

## 2024-03-07 PROCEDURE — 85025 COMPLETE CBC W/AUTO DIFF WBC: CPT

## 2024-03-07 PROCEDURE — 99232 SBSQ HOSP IP/OBS MODERATE 35: CPT | Performed by: INTERNAL MEDICINE

## 2024-03-07 PROCEDURE — 2580000003 HC RX 258: Performed by: INTERNAL MEDICINE

## 2024-03-07 PROCEDURE — 6370000000 HC RX 637 (ALT 250 FOR IP)

## 2024-03-07 PROCEDURE — 80202 ASSAY OF VANCOMYCIN: CPT

## 2024-03-07 PROCEDURE — 6360000002 HC RX W HCPCS: Performed by: INTERNAL MEDICINE

## 2024-03-07 PROCEDURE — 97530 THERAPEUTIC ACTIVITIES: CPT

## 2024-03-07 PROCEDURE — 1100000000 HC RM PRIVATE

## 2024-03-07 PROCEDURE — 6370000000 HC RX 637 (ALT 250 FOR IP): Performed by: ORTHOPAEDIC SURGERY

## 2024-03-07 PROCEDURE — 80048 BASIC METABOLIC PNL TOTAL CA: CPT

## 2024-03-07 PROCEDURE — 2580000003 HC RX 258

## 2024-03-07 PROCEDURE — 99024 POSTOP FOLLOW-UP VISIT: CPT

## 2024-03-07 PROCEDURE — 36415 COLL VENOUS BLD VENIPUNCTURE: CPT

## 2024-03-07 RX ORDER — OXYCODONE HYDROCHLORIDE 5 MG/1
5 TABLET ORAL EVERY 4 HOURS PRN
Status: DISCONTINUED | OUTPATIENT
Start: 2024-03-07 | End: 2024-03-10

## 2024-03-07 RX ORDER — OXYCODONE HYDROCHLORIDE 5 MG/1
5 TABLET ORAL EVERY 4 HOURS PRN
Status: DISCONTINUED | OUTPATIENT
Start: 2024-03-07 | End: 2024-03-07

## 2024-03-07 RX ADMIN — Medication 1 CAPSULE: at 08:46

## 2024-03-07 RX ADMIN — SODIUM CHLORIDE 125 MG: 9 INJECTION, SOLUTION INTRAVENOUS at 12:19

## 2024-03-07 RX ADMIN — CARVEDILOL 6.25 MG: 3.12 TABLET, FILM COATED ORAL at 08:46

## 2024-03-07 RX ADMIN — BACLOFEN 20 MG: 10 TABLET ORAL at 23:20

## 2024-03-07 RX ADMIN — METRONIDAZOLE 500 MG: 500 INJECTION, SOLUTION INTRAVENOUS at 03:00

## 2024-03-07 RX ADMIN — SODIUM CHLORIDE, PRESERVATIVE FREE 10 ML: 5 INJECTION INTRAVENOUS at 08:48

## 2024-03-07 RX ADMIN — ACETAMINOPHEN 1000 MG: 500 TABLET ORAL at 23:20

## 2024-03-07 RX ADMIN — OXYCODONE 5 MG: 5 TABLET ORAL at 19:42

## 2024-03-07 RX ADMIN — LEVOTHYROXINE SODIUM 100 MCG: 0.07 TABLET ORAL at 06:58

## 2024-03-07 RX ADMIN — FERROUS SULFATE TAB 325 MG (65 MG ELEMENTAL FE) 325 MG: 325 (65 FE) TAB at 15:39

## 2024-03-07 RX ADMIN — OXCARBAZEPINE 300 MG: 300 TABLET, FILM COATED ORAL at 08:46

## 2024-03-07 RX ADMIN — DULOXETINE HYDROCHLORIDE 60 MG: 30 CAPSULE, DELAYED RELEASE ORAL at 08:47

## 2024-03-07 RX ADMIN — DULOXETINE HYDROCHLORIDE 60 MG: 30 CAPSULE, DELAYED RELEASE ORAL at 23:20

## 2024-03-07 RX ADMIN — OXCARBAZEPINE 300 MG: 300 TABLET, FILM COATED ORAL at 15:39

## 2024-03-07 RX ADMIN — VANCOMYCIN HYDROCHLORIDE 1000 MG: 1 INJECTION, POWDER, LYOPHILIZED, FOR SOLUTION INTRAVENOUS at 23:20

## 2024-03-07 RX ADMIN — VANCOMYCIN HYDROCHLORIDE 1000 MG: 1 INJECTION, POWDER, LYOPHILIZED, FOR SOLUTION INTRAVENOUS at 06:58

## 2024-03-07 RX ADMIN — BACLOFEN 20 MG: 10 TABLET ORAL at 08:46

## 2024-03-07 RX ADMIN — METRONIDAZOLE 500 MG: 500 INJECTION, SOLUTION INTRAVENOUS at 20:47

## 2024-03-07 RX ADMIN — METRONIDAZOLE 500 MG: 500 INJECTION, SOLUTION INTRAVENOUS at 11:21

## 2024-03-07 RX ADMIN — VANCOMYCIN HYDROCHLORIDE 1000 MG: 1 INJECTION, POWDER, LYOPHILIZED, FOR SOLUTION INTRAVENOUS at 15:39

## 2024-03-07 RX ADMIN — CEFTAZIDIME, AVIBACTAM 2.5 G: 2; .5 POWDER, FOR SOLUTION INTRAVENOUS at 01:11

## 2024-03-07 RX ADMIN — FERROUS SULFATE TAB 325 MG (65 MG ELEMENTAL FE) 325 MG: 325 (65 FE) TAB at 08:47

## 2024-03-07 RX ADMIN — CARVEDILOL 6.25 MG: 3.12 TABLET, FILM COATED ORAL at 15:39

## 2024-03-07 RX ADMIN — OXYCODONE 5 MG: 5 TABLET ORAL at 23:17

## 2024-03-07 RX ADMIN — OXYCODONE HYDROCHLORIDE AND ACETAMINOPHEN 1000 MG: 500 TABLET ORAL at 08:47

## 2024-03-07 RX ADMIN — ACETAMINOPHEN 1000 MG: 500 TABLET ORAL at 12:18

## 2024-03-07 RX ADMIN — THERA TABS 1 TABLET: TAB at 08:47

## 2024-03-07 RX ADMIN — CEFTAZIDIME, AVIBACTAM 2.5 G: 2; .5 POWDER, FOR SOLUTION INTRAVENOUS at 08:45

## 2024-03-07 RX ADMIN — CEFTAZIDIME, AVIBACTAM 2.5 G: 2; .5 POWDER, FOR SOLUTION INTRAVENOUS at 16:44

## 2024-03-07 RX ADMIN — OXCARBAZEPINE 300 MG: 300 TABLET, FILM COATED ORAL at 11:22

## 2024-03-07 RX ADMIN — Medication 1 CAPSULE: at 23:20

## 2024-03-07 RX ADMIN — OXYCODONE HYDROCHLORIDE AND ACETAMINOPHEN 1000 MG: 500 TABLET ORAL at 23:20

## 2024-03-07 ASSESSMENT — PAIN DESCRIPTION - LOCATION
LOCATION: LEG
LOCATION: LEG
LOCATION: HIP

## 2024-03-07 ASSESSMENT — PAIN DESCRIPTION - ORIENTATION
ORIENTATION: LEFT

## 2024-03-07 ASSESSMENT — PAIN DESCRIPTION - DESCRIPTORS
DESCRIPTORS: STABBING
DESCRIPTORS: SORE
DESCRIPTORS: STABBING

## 2024-03-07 ASSESSMENT — PAIN SCALES - GENERAL
PAINLEVEL_OUTOF10: 8
PAINLEVEL_OUTOF10: 8
PAINLEVEL_OUTOF10: 6

## 2024-03-07 NOTE — PROGRESS NOTES
Spiritual Care Assessment/Progress Note  [facility]    Name: Paula Renteria MRN: [id number]    Age: 53 y.o.     Sex: female   Language: English     Date: 3/7/2024            Total Time Calculated: 25 min              Spiritual Assessment begun in [facility] SURGICAL  Service Provided For:: Patient  Referral/Consult From:: Rounding  Encounter Overview/Reason : Initial Encounter    Spiritual beliefs:      [x] Involved in a deana tradition/spiritual practice: Evangelical     [] Supported by a deana community:      [] Claims no spiritual orientation:      [] Seeking spiritual identity:           [] Adheres to an individual form of spirituality:      [] Not able to assess:                Identified resources for coping and support system:   Support System: Children, Family members       [] Prayer                  [] Devotional reading               [] Music                  [] Guided Imagery     [] Pet visits                                        [] Other: (COMMENT)     Specific area/focus of visit   Encounter:    Crisis:    Spiritual/Emotional needs: Type: Spiritual Support  Ritual, Rites and Sacraments:    Grief, Loss, and Adjustments: Type: Life Adjustments, Adjustment to illness  Ethics/Mediation:    Behavioral Health:    Palliative Care:    Advance Care Planning:      Plan/Referrals: Other (Comment) ( available upon request.)    Narrative: Patient seen by RACHNA Viera  Intern for Initial Spiritual Health Assessment. Patient is alone in her room sitting up in bedside recliner with feet elevated. States she is feeling better today. Patient shared joyful and painful life/review/legacy including children, work and [employer] at [employer]. States she has deana in God and is very thankful for her family. States she also likes puzzles on phone to help cope. Clinician in at this time. Listened empathically providing time and space for reflection and sharing of life's sacred events.  Provided words of comfort and encouragement. Patient smiled she enjoyed 's visit. Maintained presence of ministry at bedside.  available upon request.  RACHNA Viera.  Intern

## 2024-03-07 NOTE — PROGRESS NOTES
Orthopedic Progress Note    Date:3/6/2024       Room:[facility]  Patient Name:Paula Renteria     YOB: 1970     Age:53 y.o.  female     SUBJECTIVE    3/6/2024: POD #2 status postop I&D of the left hip debridement.  Patient is resting comfortably in bed during time of rounds.  She states her pain is well-controlled.  She admits to eating and drinking okay.  She is able to produce urine.  She has flatulence but reports no bowel movement yet.  She has no additional complaints no concerns today.    3/7/2024: POD #3 status postop I&D of left hip debridement.  Patient is sitting comfortably in chair 9 times around.  She states her pain is well-controlled.  She has been able to have a bowel movement.  She has no additional complaints or concerns today.    VITALS         Temp  Av.6 °F (37 °C)  Min: 98.1 °F (36.7 °C)  Max: 99 °F (37.2 °C)  Pulse  Av  Min: 53  Max: 98  Systolic (24hrs), Av , Min:90 , Max:107    Diastolic (24hrs), Av, Min:51, Max:61    Resp  Av.7  Min: 14  Max: 18  SpO2  Av.7 %  Min: 99 %  Max: 100 %  LABS      Recent Labs     24  0632 24  0612 24  0652   WBC 4.5 8.7 8.9   RBC 4.21 3.22* 2.92*   HGB 9.1* 7.2* 6.6*   HCT 31.5* 24.5* 22.3*   MCV 74.8* 76.1* 76.4*   RDW 19.6* 19.1* 19.5*   * 400 384     Recent Labs     24  0632 24  0612 24  0652    137 139   K 3.6 3.5 3.9    107 109*   CO2 27 29 27   BUN 11 8 11   GLUCOSE 102* 121* 112*   PT/INR:No results for input(s): \"PROTIME\", \"INR\" in the last 72 hours.  ESR: --   Lab Results   Component Value Date/Time    CRP 10.80 2024 06:12 AM      Results       Procedure Component Value Units Date/Time    Culture, Tissue [[id number]] Collected: 24 1319    Order Status: Completed Specimen: Tissue Updated: 24 0656     Special Requests No Special Requests        Gram stain Occasional WBCs seen         No organisms seen        Culture       No growth thus far, holding

## 2024-03-07 NOTE — PLAN OF CARE
Problem: Skin/Tissue Integrity  Goal: Absence of new skin breakdown  Description: 1.  Monitor for areas of redness and/or skin breakdown  2.  Assess vascular access sites hourly  3.  Every 4-6 hours minimum:  Change oxygen saturation probe site  4.  Every 4-6 hours:  If on nasal continuous positive airway pressure, respiratory therapy assess nares and determine need for appliance change or resting period.  Outcome: Progressing     Problem: Safety - Adult  Goal: Free from fall injury  Outcome: Progressing     Problem: ABCDS Injury Assessment  Goal: Absence of physical injury  Outcome: Progressing     Problem: Pain  Goal: Verbalizes/displays adequate comfort level or baseline comfort level  Outcome: Progressing     Problem: Physical Therapy - Adult  Goal: By Discharge: Performs mobility at highest level of function for planned discharge setting.  See evaluation for individualized goals.  Description: FUNCTIONAL STATUS PRIOR TO ADMISSION: Patient was modified independent using a rollator and small base quad cane for functional mobility. and The patient  required minimal assistance for basic and instrumental ADLs.    HOME SUPPORT PRIOR TO ADMISSION: The patient lived with family and required minimal assistance for transfers and ADLs.    Physical Therapy Goals  Initiated 3/5/2024  Pt stated goal: to get stronger  Pt will be I with LE HEP in 7 days.  Pt will perform bed mobility with Modified [place] in 7 days.  Pt will perform transfers with Contact Guard Assist in 7 days.   Pt will amb 5-10 feet with LRAD safely with Moderate Assist in 7 days.  Pt will verbalize and demonstrate compliance with TTWB/PWB 15# precautions in 7 days.   Pt will demonstrate improvement in standing balance from fair/poor to good/fair in 7 days.     3/7/2024 1453 by Usha Pinzon, LULU  Outcome: Progressing

## 2024-03-07 NOTE — CARE COORDINATION
Followed up with patient to talk about choice for SNF. [facility] would [facility]. Referral sent via [employer].

## 2024-03-07 NOTE — PLAN OF CARE
Problem: Skin/Tissue Integrity  Goal: Absence of new skin breakdown  Description: 1.  Monitor for areas of redness and/or skin breakdown  2.  Assess vascular access sites hourly  3.  Every 4-6 hours minimum:  Change oxygen saturation probe site  4.  Every 4-6 hours:  If on nasal continuous positive airway pressure, respiratory therapy assess nares and determine need for appliance change or resting period.  Outcome: Progressing     Problem: Safety - Adult  Goal: Free from fall injury  Outcome: Progressing     Problem: ABCDS Injury Assessment  Goal: Absence of physical injury  Outcome: Progressing     Problem: Pain  Goal: Verbalizes/displays adequate comfort level or baseline comfort level  Outcome: Progressing     Problem: Physical Therapy - Adult  Goal: By Discharge: Performs mobility at highest level of function for planned discharge setting.  See evaluation for individualized goals.  Description: FUNCTIONAL STATUS PRIOR TO ADMISSION: Patient was modified independent using a rollator and small base quad cane for functional mobility. and The patient  required minimal assistance for basic and instrumental ADLs.    HOME SUPPORT PRIOR TO ADMISSION: The patient lived with family and required minimal assistance for transfers and ADLs.    Physical Therapy Goals  Initiated 3/5/2024  Pt stated goal: to get stronger  Pt will be I with LE HEP in 7 days.  Pt will perform bed mobility with Modified [place] in 7 days.  Pt will perform transfers with Contact Guard Assist in 7 days.   Pt will amb 5-10 feet with LRAD safely with Moderate Assist in 7 days.  Pt will verbalize and demonstrate compliance with TTWB/PWB 15# precautions in 7 days.   Pt will demonstrate improvement in standing balance from fair/poor to good/fair in 7 days.     3/6/2024 1242 by Maricruz Hurtado, PT  Outcome: Progressing     Problem: Occupational Therapy - Adult  Goal: By Discharge: Performs self-care activities at highest level of function for planned

## 2024-03-07 NOTE — PLAN OF CARE
PHYSICAL THERAPY TREATMENT     Patient: Paula Renteria (53 y.o. female)  Date: 3/7/2024  Diagnosis: Pyogenic arthritis of left hip, due to unspecified organism (HCC) [M00.9]  Pain of left hip [M25.552] Pyogenic arthritis of left hip, due to unspecified organism (HCC)  Procedure(s) (LRB):  DEBRIDEMENT LEFT HIP, GIRDLESTONE RESECTION LEFT HIP, ANTIBIOTIC CEMENT SPACER PLACEMENT LEFT HIP, ADDUCTOR TENOTOMY LEFT HIP (Left) 3 Days Post-Op  Precautions: Fall Risk, Contact Precautions, Weight Bearing, Bed Alarm     Left Lower Extremity Weight Bearing: Toe Touch Weight Bearing Partial Weight Bearing Percentage Or Pounds: 15 LBS              Recommendations for nursing mobility: Out of bed to chair for meals, LE elevation for management of edema, Use of BSC for toileting , AD and gt belt for bed to chair , and Assist x2    In place during session: Wound vac and IV  Chart, physical therapy assessment, plan of care and goals were reviewed.  ASSESSMENT  Patient continues with skilled PT services and is progressing towards goals. Pt received semi supine upon PT arrival, agreeable to session. Pt A&O x 4. (See below for objective details and assist levels). Pt. Stated she was having more surgery tomorrow.    Overall pt tolerated session fair today with Tfs to chair. Pt. Required min assist for bed mobility and Tfs to chair. Pt. Was able to maintain TTWB L LE. Pt. Required increased time for all mobility and positioning as well as management of equipment. Pt. Performed R LE exercises in chair. Will continue to benefit from skilled PT services, and will continue to progress as tolerated. Potential barriers for safe discharge: pt is a high fall risk, pt is not safe to be alone, concern for pt safely navigating or managing the home environment, and pt has new weight bearing restrictions limiting activity or patient is unable to maintain. Current PT DC recommendation Skilled Nursing Facility once medically appropriate.     Start  discussed with: Registered nurse    Patient Education  Education Given To: Patient  Education Provided: Role of Therapy;Plan of Care;Precautions;Equipment;Transfer Training  Education Method: Verbal;Demonstration;Teach Back  Barriers to Learning: None  Education Outcome: Verbalized understanding;Continued education needed      Usha Velasquez PTA  Minutes: 41

## 2024-03-07 NOTE — PROGRESS NOTES
4 Eyes Skin Assessment     NAME:  Paula Renteria  YOB: 1970  MEDICAL RECORD NUMBER:  [id number]    The patient is being assessed for  Other Weekly Admission    I agree that at least one RN has performed a thorough Head to Toe Skin Assessment on the patient. ALL assessment sites listed below have been assessed.      Areas assessed by both nurses:    Head, Face, Ears, Shoulders, Back, Chest, Arms, Elbows, Hands, Sacrum. Buttock, Coccyx, Ischium, Legs. Feet and Heels, and Under Medical Devices         Does the Patient have a Wound? No noted wound(s)       Ranjit Prevention initiated by RN: Yes  Wound Care Orders initiated by RN: No    Pressure Injury (Stage 3,4, Unstageable, DTI, NWPT, and Complex wounds) if present, place Wound referral order by RN under : No    New Ostomies, if present place, Ostomy referral order under : No     Nurse 1 eSignature: Electronically signed by Renita Velasquez RN on 3/6/24 at 7:09 PM EST    **SHARE this note so that the co-signing nurse can place an eSignature**    Nurse 2 eSignature: Electronically signed by Bambi Mclean RN on 3/6/24 at 9:02 PM EST

## 2024-03-07 NOTE — PROGRESS NOTES
Vancomycin Dosing Consult  Paula Renteria is a 53 y.o. female with GPC bacteremia. Pharmacy was consulted by Dr. Garrison to dose and monitor vancomycin. Today is day 5.    Antibiotic regimen: Vancomycin + Avycaz    Temp (24hrs), Av.6 °F (37 °C), Min:97.9 °F (36.6 °C), Max:99.1 °F (37.3 °C)    Recent Labs     24  0612 24  0652 24  0537   WBC 8.7 8.9 6.6   CREATININE 0.48* 0.41* 0.40*   BUN 8 11 9     Est CrCl: 112 mL/min  Concomitant nephrotoxic drugs: Contrast agents    Cultures:   24: Blood cultures x 2: CoNS in 1 of 4 (< 24 hrs)  3/1 Blood: NGTD, final  3/4 Tissue: NG    MRSA Swab: Not ordered, patient already received first dose of vancomycin    Target range: AUC/ISABELLA 400-600    Recent level history:  Date/Time Dose & Interval Measured Level (mcg/mL) Associated AUC/ISABELLA   3/1 @ 1413 1750mg x 1, then 1500mg q12h 18.4 727   3/4 @ 0632 1000 mg q12h 9.5 436   3/7 @ 1434 1000 mg q8h 11.4 462        Assessment/Plan:   Patient has history of multiple back surgeries with hardware replacement and infection complications, completed a course of meropenem in 2024.   Documented allergy to vancomycin with hives. Per patient, she has tolerated vancomycin if pre-medicated with Benadryl.   Renal function normal and stable  Level is at target  Continue vancomycin dose 1000 mg q 8 hours  Next level scheduled for 3/8 @2100  Antimicrobial stop date TBD

## 2024-03-07 NOTE — PROGRESS NOTES
Infectious Disease Progress Note           Subjective:   Remains stable, no acute events since last seen, left hip pain is better.  Afebrile with a normal WBC on routine labs  Objective:   Physical Exam:     BP (!) 101/58   Pulse 88   Temp 98.8 °F (37.1 °C)   Resp 16   Ht 1.575 m (5' 2\")   Wt 70.3 kg (155 lb)   SpO2 97%   BMI 28.35 kg/m²    O2 Device: None (Room air)    Temp (24hrs), Av.6 °F (37 °C), Min:97.9 °F (36.6 °C), Max:99.1 °F (37.3 °C)    701 - 1900  In: 15.9 [I.V.:15.9]  Out: -    1901 -  07  In: 4253.8 [P.O.:1440; I.V.:115.5]  Out: 1350 [Urine:1150; Drains:200]    General: NAD, AAO x 4  HEENT: GIORGI, Moist mucosa   Lungs: decreased at the bases, no wheeze/rhonchi   Heart: S1S2+, RRR, no murmur  Abdo: Soft, NT, ND, +BS   : No higginbotham cath   Exts left anterior hip dressing in place   Skin: Wound Vac on lower back     Data Review:       Recent Days:    Recent Labs     24  0612 24  0652 24  0537   WBC 8.7 8.9 6.6   HGB 7.2* 6.6* 7.0*   HCT 24.5* 22.3* 22.8*    384 340       Recent Labs     24  0612 24  0652 24  0537   BUN 8 11 9   CREATININE 0.48* 0.41* 0.40*         Lab Results   Component Value Date/Time    CRP 10.80 2024 06:12 AM     Microbiology     Results       Procedure Component Value Units Date/Time    Culture, Tissue [[id number]] Collected: 24 1319    Order Status: Completed Specimen: Tissue Updated: 24 0656     Special Requests No Special Requests        Gram stain Occasional WBCs seen         No organisms seen        Culture       No growth thus far, holding 14 days.          Culture, Wound [[id number]] Collected: 24 1309    Order Status: Completed Specimen: Swab from Joint, Hip Updated: 24 0648     Special Requests No Special Requests        Gram stain Occasional WBCs seen         No organisms seen        Culture       No growth thus far, holding 14 days.           1413    Order Status: Completed Specimen: Blood Updated: 03/07/24 1508     Special Requests --        No Special Requests  Left  Hand       Culture No growth 6 days       Culture, Blood, PCR ID Panel [[id number]] Collected: 02/28/24 1450    Order Status: Canceled Specimen: Blood     Culture, Blood, PCR ID Panel [[id number]]  (Abnormal) Collected: 02/28/24 1450    Order Status: Completed Specimen: Blood Updated: 02/29/24 1613     Accession Number Blood Culture Bottle        Enterococcus faecalis by PCR Not detected     Enterococcus faecium by PCR Not detected     Listeria monocytogenes by PCR Not detected     STAPHYLOCOCCUS Detected        Staphylococcus Aureus Not detected     Staphylococcus epidermidis by PCR Not detected     Staphylococcus lugdunensis by PCR Not detected     STREPTOCOCCUS Not detected     Streptococcus agalactiae (Group B) Not detected     Strep pneumoniae Not detected     Strep pyogenes,(Grp. A) Not detected     Acinetobacter calcoac baumannii complex by PCR Not detected     Bacteroides fragilis by PCR Not detected     Enterobacteriaceae by PCR Not detected     Enterobacter cloacae complex by PCR Not detected     Escherichia Coli Not detected     Klebsiella aerogenes by PCR Not detected     Klebsiella oxytoca by PCR Not detected     Klebsiella pneumoniae group by PCR Not detected     Proteus by PCR Not detected     Salmonella species by PCR Not detected     Serratia marcescens by PCR Not detected     Haemophilus Influenzae by PCR Not detected     Neisseria meningitidis by PCR Not detected     Pseudomonas aeruginosa Not detected     Stenotrophomonas maltophilia by PCR Not detected     Candida albicans by PCR Not detected     Candida auris by PCR Not detected     Candida glabrata Not detected     Candida krusei by PCR Not detected     Candida parapsilosis by PCR Not detected     Candida tropicalis by PCR Not detected     Cryptococcus neoformans/gattii by PCR Not detected     Resistant gene targets

## 2024-03-07 NOTE — PROGRESS NOTES
Hospitalist Progress Note    NAME:   Paula Renteria   : 1970   MRN: [id number]     Date/Time: 3/7/2024 1:36 PM  Patient PCP: Lion Liu MD    Estimated discharge date: >48 hours  Barriers: OR for I&D on       HOSPITAL COURSE:    Paula Renteria is a 53 y.o.  female with PMHx significant for several lumbar fusion surgeries, most recently a fusion in  that required several washout surgeries and has had an open wound since.  Patient admitted 24  because her orthopedic doctor, Dr. Ramirez, recommended her to come to the ER for septic arthritis of left hip. CT shows septic arthritis of the left hip with collapse of the left femoral head.  Diffuse osteosclerosis of the left hemipelvis can be seen with chronic osteomyelitis.  Culture of tissue from 2024 shows moderate Pseudomonas MDRO and moderate bacteroides thetaiotaomicron beta lactamase positive.   ID consulted.  ID started patient on Avycaz.  PICC line placed.  Ortho consulted. upta perioperative risk shows 0.5% risk of MI or cardiac arrest. [employer] Risk calculator shows an average risk of serious complications including surgical site infections, UTI, pneumonia.  Below average chance of VTE, renal failure, death.  Patient medically cleared for procedure. Taken to OR 3/4/2024 for debridement of left hip with girdlestone resection.     Assessment / Plan:    Septic arthritis/chronic osteomyelitis involving the left hip  - s/p left hip debridement on . Also had IntraOp antibiotic cement not articulating spacer placed in the left hip.  - Intra-op Cxs pending, no organisms on Gram stain   - ID on board.  Patient on vancomycin and Avycaz.  - Orthopedic surgery following  - Scheduled for I&D with Dr. Ramirez on      Staph species bacteremia  - Isolated from , extensive hardware in the spine   - Repeat blood Cx from  is neg, continue on Vanc for now   - Chronic lower back infection, underlying hardware    ________________________________________________________________________  Total NON critical care TIME:  35  Minutes    Total CRITICAL CARE TIME Spent:   Minutes non procedure based      Comments   >50% of visit spent in counseling and coordination of care     ________________________________________________________________________  Bolivar Ruiz PA-C     Procedures: see electronic medical records for all procedures/Xrays and details which were not copied into this note but were reviewed prior to creation of Plan.      LABS:  I reviewed today's most current labs and imaging studies.  Pertinent labs include:  Recent Labs     03/05/24 0612 03/06/24 0652 03/07/24  0537   WBC 8.7 8.9 6.6   HGB 7.2* 6.6* 7.0*   HCT 24.5* 22.3* 22.8*    384 340     Recent Labs     03/05/24 0612 03/06/24 0652 03/07/24  0537    139 140   K 3.5 3.9 4.0    109* 109*   CO2 29 27 26   BUN 8 11 9       Signed: Bolivar Ruiz PA-C

## 2024-03-08 ENCOUNTER — ANESTHESIA (OUTPATIENT)
Facility: HOSPITAL | Age: 54
DRG: 481 | End: 2024-03-08
Payer: MEDICARE

## 2024-03-08 ENCOUNTER — ANESTHESIA EVENT (OUTPATIENT)
Facility: HOSPITAL | Age: 54
DRG: 481 | End: 2024-03-08
Payer: MEDICARE

## 2024-03-08 LAB
ABO + RH BLD: NORMAL
ANION GAP SERPL CALC-SCNC: 3 MMOL/L (ref 5–15)
BLOOD GROUP ANTIBODIES SERPL: NEGATIVE
BUN SERPL-MCNC: 8 MG/DL (ref 6–20)
BUN/CREAT SERPL: 22 (ref 12–20)
CA-I BLD-MCNC: 8.3 MG/DL (ref 8.5–10.1)
CHLORIDE SERPL-SCNC: 110 MMOL/L (ref 97–108)
CO2 SERPL-SCNC: 27 MMOL/L (ref 21–32)
CREAT SERPL-MCNC: 0.37 MG/DL (ref 0.55–1.02)
ERYTHROCYTE [DISTWIDTH] IN BLOOD BY AUTOMATED COUNT: 19.3 % (ref 11.5–14.5)
GLUCOSE BLD STRIP.AUTO-MCNC: 127 MG/DL (ref 65–100)
GLUCOSE SERPL-MCNC: 103 MG/DL (ref 65–100)
HCT VFR BLD AUTO: 24.9 % (ref 35–47)
HGB BLD-MCNC: 7.4 G/DL (ref 11.5–16)
MCH RBC QN AUTO: 23.1 PG (ref 26–34)
MCHC RBC AUTO-ENTMCNC: 29.7 G/DL (ref 30–36.5)
MCV RBC AUTO: 77.8 FL (ref 80–99)
NRBC # BLD: 0 K/UL (ref 0–0.01)
NRBC BLD-RTO: 0 PER 100 WBC
PERFORMED BY:: ABNORMAL
PLATELET # BLD AUTO: 411 K/UL (ref 150–400)
PMV BLD AUTO: 9.4 FL (ref 8.9–12.9)
POTASSIUM SERPL-SCNC: 3.7 MMOL/L (ref 3.5–5.1)
RBC # BLD AUTO: 3.2 M/UL (ref 3.8–5.2)
SODIUM SERPL-SCNC: 140 MMOL/L (ref 136–145)
SPECIMEN EXP DATE BLD: NORMAL
WBC # BLD AUTO: 6.9 K/UL (ref 3.6–11)

## 2024-03-08 PROCEDURE — 2500000003 HC RX 250 WO HCPCS: Performed by: NURSE ANESTHETIST, CERTIFIED REGISTERED

## 2024-03-08 PROCEDURE — 86901 BLOOD TYPING SEROLOGIC RH(D): CPT

## 2024-03-08 PROCEDURE — 6360000002 HC RX W HCPCS: Performed by: ORTHOPAEDIC SURGERY

## 2024-03-08 PROCEDURE — 2580000003 HC RX 258: Performed by: ORTHOPAEDIC SURGERY

## 2024-03-08 PROCEDURE — 80048 BASIC METABOLIC PNL TOTAL CA: CPT

## 2024-03-08 PROCEDURE — 2580000003 HC RX 258: Performed by: NURSE ANESTHETIST, CERTIFIED REGISTERED

## 2024-03-08 PROCEDURE — 6360000002 HC RX W HCPCS: Performed by: INTERNAL MEDICINE

## 2024-03-08 PROCEDURE — 36415 COLL VENOUS BLD VENIPUNCTURE: CPT

## 2024-03-08 PROCEDURE — C1713 ANCHOR/SCREW BN/BN,TIS/BN: HCPCS | Performed by: ORTHOPAEDIC SURGERY

## 2024-03-08 PROCEDURE — 6370000000 HC RX 637 (ALT 250 FOR IP)

## 2024-03-08 PROCEDURE — 87176 TISSUE HOMOGENIZATION CULTR: CPT

## 2024-03-08 PROCEDURE — 3600000012 HC SURGERY LEVEL 2 ADDTL 15MIN: Performed by: ORTHOPAEDIC SURGERY

## 2024-03-08 PROCEDURE — 7100000001 HC PACU RECOVERY - ADDTL 15 MIN: Performed by: ORTHOPAEDIC SURGERY

## 2024-03-08 PROCEDURE — 87070 CULTURE OTHR SPECIMN AEROBIC: CPT

## 2024-03-08 PROCEDURE — 2709999900 HC NON-CHARGEABLE SUPPLY: Performed by: ORTHOPAEDIC SURGERY

## 2024-03-08 PROCEDURE — 6370000000 HC RX 637 (ALT 250 FOR IP): Performed by: INTERNAL MEDICINE

## 2024-03-08 PROCEDURE — 2720000010 HC SURG SUPPLY STERILE: Performed by: ORTHOPAEDIC SURGERY

## 2024-03-08 PROCEDURE — 6360000002 HC RX W HCPCS: Performed by: NURSE ANESTHETIST, CERTIFIED REGISTERED

## 2024-03-08 PROCEDURE — 82962 GLUCOSE BLOOD TEST: CPT

## 2024-03-08 PROCEDURE — 7100000000 HC PACU RECOVERY - FIRST 15 MIN: Performed by: ORTHOPAEDIC SURGERY

## 2024-03-08 PROCEDURE — 6370000000 HC RX 637 (ALT 250 FOR IP): Performed by: ORTHOPAEDIC SURGERY

## 2024-03-08 PROCEDURE — 2580000003 HC RX 258: Performed by: INTERNAL MEDICINE

## 2024-03-08 PROCEDURE — 0QB70ZZ EXCISION OF LEFT UPPER FEMUR, OPEN APPROACH: ICD-10-PCS | Performed by: ORTHOPAEDIC SURGERY

## 2024-03-08 PROCEDURE — 86850 RBC ANTIBODY SCREEN: CPT

## 2024-03-08 PROCEDURE — 1100000000 HC RM PRIVATE

## 2024-03-08 PROCEDURE — 85027 COMPLETE CBC AUTOMATED: CPT

## 2024-03-08 PROCEDURE — 3700000000 HC ANESTHESIA ATTENDED CARE: Performed by: ORTHOPAEDIC SURGERY

## 2024-03-08 PROCEDURE — 99232 SBSQ HOSP IP/OBS MODERATE 35: CPT | Performed by: INTERNAL MEDICINE

## 2024-03-08 PROCEDURE — 3600000002 HC SURGERY LEVEL 2 BASE: Performed by: ORTHOPAEDIC SURGERY

## 2024-03-08 PROCEDURE — 86900 BLOOD TYPING SEROLOGIC ABO: CPT

## 2024-03-08 PROCEDURE — 3700000001 HC ADD 15 MINUTES (ANESTHESIA): Performed by: ORTHOPAEDIC SURGERY

## 2024-03-08 PROCEDURE — 87205 SMEAR GRAM STAIN: CPT

## 2024-03-08 PROCEDURE — 97605 NEG PRS WND THER DME<=50SQCM: CPT

## 2024-03-08 DEVICE — STIMULAN® RAPID CURE PROVIDED STERILE FOR SINGLE PATIENT USE. STIMULAN® RAPID CURE CONTAINS CALCIUM SULFATE POWDER AND MIXING SOLUTION IN PRE-MEASURED QUANTITIES SO THAT WHEN MIXED TOGETHER IN A STERILE MIXING BOWL, THE RESULTANT PASTE IS TO BE DIGITALLY PACKED INTO OPEN BONE VOID/GAP TO SET INSITU OR PLACED INTO THE MOULD PROVIDED, THE MIXTURE SETS TO FORM BEADS. THE BIODEGRADABLE, RADIOPAQUE BEADS ARE RESORBED IN APPROXIMATELY 30 – 60 DAYS WHEN USED IN ACCORDANCE WITH THE DEVICE LABELLING. STIMULAN® RAPID CURE IS MANUFACTURED FROM SYNTHETIC IMPLANT GRADE CALCIUM SULFATE DIHYDRATE(CASO4.2H2O) THAT RESORBS AND IS REPLACED WITH BONE DURING THE HEALING PROCESS. ALSO, AS THE BONE VOID FILLER BEADS ARE BIODEGRADABLE AND BIOCOMPATIBLE, THEY MAY BE USED AT AN INFECTED SITE.
Type: IMPLANTABLE DEVICE | Site: HIP | Status: FUNCTIONAL
Brand: STIMULAN® RAPID CURE

## 2024-03-08 RX ORDER — HYDROMORPHONE HYDROCHLORIDE 1 MG/ML
1 INJECTION, SOLUTION INTRAMUSCULAR; INTRAVENOUS; SUBCUTANEOUS EVERY 4 HOURS PRN
Status: DISCONTINUED | OUTPATIENT
Start: 2024-03-08 | End: 2024-03-13 | Stop reason: HOSPADM

## 2024-03-08 RX ORDER — ACETAMINOPHEN 500 MG
1000 TABLET ORAL EVERY 6 HOURS
Status: DISCONTINUED | OUTPATIENT
Start: 2024-03-08 | End: 2024-03-13 | Stop reason: HOSPADM

## 2024-03-08 RX ORDER — SODIUM CHLORIDE 9 MG/ML
INJECTION, SOLUTION INTRAVENOUS PRN
Status: DISCONTINUED | OUTPATIENT
Start: 2024-03-08 | End: 2024-03-08 | Stop reason: HOSPADM

## 2024-03-08 RX ORDER — DEXTROSE MONOHYDRATE 100 MG/ML
INJECTION, SOLUTION INTRAVENOUS CONTINUOUS PRN
Status: DISCONTINUED | OUTPATIENT
Start: 2024-03-08 | End: 2024-03-08 | Stop reason: HOSPADM

## 2024-03-08 RX ORDER — SODIUM CHLORIDE 0.9 % (FLUSH) 0.9 %
5-40 SYRINGE (ML) INJECTION PRN
Status: DISCONTINUED | OUTPATIENT
Start: 2024-03-08 | End: 2024-03-08 | Stop reason: HOSPADM

## 2024-03-08 RX ORDER — TRAMADOL HYDROCHLORIDE 50 MG/1
50 TABLET ORAL EVERY 6 HOURS PRN
Status: DISCONTINUED | OUTPATIENT
Start: 2024-03-08 | End: 2024-03-10

## 2024-03-08 RX ORDER — IPRATROPIUM BROMIDE AND ALBUTEROL SULFATE 2.5; .5 MG/3ML; MG/3ML
1 SOLUTION RESPIRATORY (INHALATION)
Status: DISCONTINUED | OUTPATIENT
Start: 2024-03-08 | End: 2024-03-08 | Stop reason: HOSPADM

## 2024-03-08 RX ORDER — PROPOFOL 10 MG/ML
INJECTION, EMULSION INTRAVENOUS PRN
Status: DISCONTINUED | OUTPATIENT
Start: 2024-03-08 | End: 2024-03-08 | Stop reason: SDUPTHER

## 2024-03-08 RX ORDER — MIDAZOLAM HYDROCHLORIDE 1 MG/ML
INJECTION INTRAMUSCULAR; INTRAVENOUS PRN
Status: DISCONTINUED | OUTPATIENT
Start: 2024-03-08 | End: 2024-03-08 | Stop reason: SDUPTHER

## 2024-03-08 RX ORDER — VANCOMYCIN HYDROCHLORIDE 1 G/20ML
INJECTION, POWDER, LYOPHILIZED, FOR SOLUTION INTRAVENOUS PRN
Status: DISCONTINUED | OUTPATIENT
Start: 2024-03-08 | End: 2024-03-08 | Stop reason: HOSPADM

## 2024-03-08 RX ORDER — OXYCODONE HYDROCHLORIDE 5 MG/1
5 TABLET ORAL PRN
Status: DISCONTINUED | OUTPATIENT
Start: 2024-03-08 | End: 2024-03-08 | Stop reason: HOSPADM

## 2024-03-08 RX ORDER — NALOXONE HYDROCHLORIDE 0.4 MG/ML
INJECTION, SOLUTION INTRAMUSCULAR; INTRAVENOUS; SUBCUTANEOUS PRN
Status: DISCONTINUED | OUTPATIENT
Start: 2024-03-08 | End: 2024-03-08 | Stop reason: HOSPADM

## 2024-03-08 RX ORDER — DIPHENHYDRAMINE HYDROCHLORIDE 50 MG/ML
12.5 INJECTION INTRAMUSCULAR; INTRAVENOUS
Status: DISCONTINUED | OUTPATIENT
Start: 2024-03-08 | End: 2024-03-08 | Stop reason: HOSPADM

## 2024-03-08 RX ORDER — ROCURONIUM BROMIDE 10 MG/ML
INJECTION, SOLUTION INTRAVENOUS PRN
Status: DISCONTINUED | OUTPATIENT
Start: 2024-03-08 | End: 2024-03-08 | Stop reason: SDUPTHER

## 2024-03-08 RX ORDER — METOCLOPRAMIDE HYDROCHLORIDE 5 MG/ML
10 INJECTION INTRAMUSCULAR; INTRAVENOUS
Status: DISCONTINUED | OUTPATIENT
Start: 2024-03-08 | End: 2024-03-08 | Stop reason: HOSPADM

## 2024-03-08 RX ORDER — LABETALOL HYDROCHLORIDE 5 MG/ML
10 INJECTION, SOLUTION INTRAVENOUS
Status: DISCONTINUED | OUTPATIENT
Start: 2024-03-08 | End: 2024-03-08 | Stop reason: HOSPADM

## 2024-03-08 RX ORDER — HYDROMORPHONE HYDROCHLORIDE 1 MG/ML
0.5 INJECTION, SOLUTION INTRAMUSCULAR; INTRAVENOUS; SUBCUTANEOUS EVERY 4 HOURS PRN
Status: DISCONTINUED | OUTPATIENT
Start: 2024-03-08 | End: 2024-03-10

## 2024-03-08 RX ORDER — ONDANSETRON 2 MG/ML
4 INJECTION INTRAMUSCULAR; INTRAVENOUS
Status: DISCONTINUED | OUTPATIENT
Start: 2024-03-08 | End: 2024-03-08 | Stop reason: HOSPADM

## 2024-03-08 RX ORDER — FENTANYL CITRATE 50 UG/ML
50 INJECTION, SOLUTION INTRAMUSCULAR; INTRAVENOUS EVERY 5 MIN PRN
Status: DISCONTINUED | OUTPATIENT
Start: 2024-03-08 | End: 2024-03-08 | Stop reason: HOSPADM

## 2024-03-08 RX ORDER — LORAZEPAM 2 MG/ML
0.5 INJECTION INTRAMUSCULAR
Status: DISCONTINUED | OUTPATIENT
Start: 2024-03-08 | End: 2024-03-08 | Stop reason: HOSPADM

## 2024-03-08 RX ORDER — SUCCINYLCHOLINE/SOD CL,ISO/PF 200MG/10ML
SYRINGE (ML) INTRAVENOUS PRN
Status: DISCONTINUED | OUTPATIENT
Start: 2024-03-08 | End: 2024-03-08 | Stop reason: SDUPTHER

## 2024-03-08 RX ORDER — DEXAMETHASONE SODIUM PHOSPHATE 4 MG/ML
INJECTION, SOLUTION INTRA-ARTICULAR; INTRALESIONAL; INTRAMUSCULAR; INTRAVENOUS; SOFT TISSUE PRN
Status: DISCONTINUED | OUTPATIENT
Start: 2024-03-08 | End: 2024-03-08 | Stop reason: SDUPTHER

## 2024-03-08 RX ORDER — SODIUM CHLORIDE, SODIUM LACTATE, POTASSIUM CHLORIDE, CALCIUM CHLORIDE 600; 310; 30; 20 MG/100ML; MG/100ML; MG/100ML; MG/100ML
INJECTION, SOLUTION INTRAVENOUS ONCE
Status: DISCONTINUED | OUTPATIENT
Start: 2024-03-08 | End: 2024-03-08 | Stop reason: HOSPADM

## 2024-03-08 RX ORDER — SODIUM CHLORIDE 0.9 % (FLUSH) 0.9 %
5-40 SYRINGE (ML) INJECTION EVERY 12 HOURS SCHEDULED
Status: DISCONTINUED | OUTPATIENT
Start: 2024-03-08 | End: 2024-03-08 | Stop reason: HOSPADM

## 2024-03-08 RX ORDER — ONDANSETRON 2 MG/ML
INJECTION INTRAMUSCULAR; INTRAVENOUS PRN
Status: DISCONTINUED | OUTPATIENT
Start: 2024-03-08 | End: 2024-03-08 | Stop reason: SDUPTHER

## 2024-03-08 RX ORDER — GLUCAGON 1 MG/ML
1 KIT INJECTION PRN
Status: DISCONTINUED | OUTPATIENT
Start: 2024-03-08 | End: 2024-03-08 | Stop reason: HOSPADM

## 2024-03-08 RX ORDER — HYDROMORPHONE HYDROCHLORIDE 1 MG/ML
0.5 INJECTION, SOLUTION INTRAMUSCULAR; INTRAVENOUS; SUBCUTANEOUS EVERY 5 MIN PRN
Status: DISCONTINUED | OUTPATIENT
Start: 2024-03-08 | End: 2024-03-08 | Stop reason: HOSPADM

## 2024-03-08 RX ORDER — SODIUM CHLORIDE, SODIUM LACTATE, POTASSIUM CHLORIDE, CALCIUM CHLORIDE 600; 310; 30; 20 MG/100ML; MG/100ML; MG/100ML; MG/100ML
INJECTION, SOLUTION INTRAVENOUS CONTINUOUS PRN
Status: DISCONTINUED | OUTPATIENT
Start: 2024-03-08 | End: 2024-03-08 | Stop reason: SDUPTHER

## 2024-03-08 RX ORDER — HYDRALAZINE HYDROCHLORIDE 20 MG/ML
10 INJECTION INTRAMUSCULAR; INTRAVENOUS
Status: DISCONTINUED | OUTPATIENT
Start: 2024-03-08 | End: 2024-03-08 | Stop reason: HOSPADM

## 2024-03-08 RX ORDER — OXYCODONE HYDROCHLORIDE 5 MG/1
10 TABLET ORAL PRN
Status: DISCONTINUED | OUTPATIENT
Start: 2024-03-08 | End: 2024-03-08 | Stop reason: HOSPADM

## 2024-03-08 RX ORDER — FENTANYL CITRATE 50 UG/ML
INJECTION, SOLUTION INTRAMUSCULAR; INTRAVENOUS PRN
Status: DISCONTINUED | OUTPATIENT
Start: 2024-03-08 | End: 2024-03-08 | Stop reason: SDUPTHER

## 2024-03-08 RX ADMIN — METRONIDAZOLE 500 MG: 500 INJECTION, SOLUTION INTRAVENOUS at 04:29

## 2024-03-08 RX ADMIN — OXYCODONE HYDROCHLORIDE AND ACETAMINOPHEN 1000 MG: 500 TABLET ORAL at 09:42

## 2024-03-08 RX ADMIN — FERROUS SULFATE TAB 325 MG (65 MG ELEMENTAL FE) 325 MG: 325 (65 FE) TAB at 09:42

## 2024-03-08 RX ADMIN — PROPOFOL 100 MG: 10 INJECTION, EMULSION INTRAVENOUS at 14:36

## 2024-03-08 RX ADMIN — BACLOFEN 20 MG: 10 TABLET ORAL at 09:41

## 2024-03-08 RX ADMIN — THERA TABS 1 TABLET: TAB at 09:42

## 2024-03-08 RX ADMIN — Medication 1 CAPSULE: at 09:41

## 2024-03-08 RX ADMIN — FERROUS SULFATE TAB 325 MG (65 MG ELEMENTAL FE) 325 MG: 325 (65 FE) TAB at 17:20

## 2024-03-08 RX ADMIN — DIPHENHYDRAMINE HYDROCHLORIDE 25 MG: 25 CAPSULE ORAL at 10:46

## 2024-03-08 RX ADMIN — TRAMADOL HYDROCHLORIDE 50 MG: 50 TABLET, COATED ORAL at 20:26

## 2024-03-08 RX ADMIN — OXCARBAZEPINE 300 MG: 300 TABLET, FILM COATED ORAL at 10:46

## 2024-03-08 RX ADMIN — MIDAZOLAM HYDROCHLORIDE 2 MG: 1 INJECTION INTRAMUSCULAR; INTRAVENOUS at 14:29

## 2024-03-08 RX ADMIN — OXCARBAZEPINE 300 MG: 300 TABLET, FILM COATED ORAL at 17:20

## 2024-03-08 RX ADMIN — METRONIDAZOLE 500 MG: 500 INJECTION, SOLUTION INTRAVENOUS at 17:23

## 2024-03-08 RX ADMIN — OXYCODONE 5 MG: 5 TABLET ORAL at 18:33

## 2024-03-08 RX ADMIN — CARVEDILOL 6.25 MG: 3.12 TABLET, FILM COATED ORAL at 09:41

## 2024-03-08 RX ADMIN — SODIUM CHLORIDE, SODIUM LACTATE, POTASSIUM CHLORIDE, CALCIUM CHLORIDE: 600; 310; 30; 20 INJECTION, SOLUTION INTRAVENOUS at 14:28

## 2024-03-08 RX ADMIN — OXYCODONE 5 MG: 5 TABLET ORAL at 09:50

## 2024-03-08 RX ADMIN — CEFTAZIDIME, AVIBACTAM 2.5 G: 2; .5 POWDER, FOR SOLUTION INTRAVENOUS at 18:34

## 2024-03-08 RX ADMIN — DULOXETINE HYDROCHLORIDE 60 MG: 30 CAPSULE, DELAYED RELEASE ORAL at 20:26

## 2024-03-08 RX ADMIN — ACETAMINOPHEN 1000 MG: 500 TABLET ORAL at 18:31

## 2024-03-08 RX ADMIN — OXYCODONE HYDROCHLORIDE AND ACETAMINOPHEN 1000 MG: 500 TABLET ORAL at 20:29

## 2024-03-08 RX ADMIN — DEXAMETHASONE SODIUM PHOSPHATE 4 MG: 4 INJECTION, SOLUTION INTRA-ARTICULAR; INTRALESIONAL; INTRAMUSCULAR; INTRAVENOUS; SOFT TISSUE at 15:04

## 2024-03-08 RX ADMIN — CARVEDILOL 6.25 MG: 3.12 TABLET, FILM COATED ORAL at 17:20

## 2024-03-08 RX ADMIN — FENTANYL CITRATE 100 MCG: 50 INJECTION, SOLUTION INTRAMUSCULAR; INTRAVENOUS at 14:36

## 2024-03-08 RX ADMIN — ONDANSETRON 4 MG: 2 INJECTION INTRAMUSCULAR; INTRAVENOUS at 15:04

## 2024-03-08 RX ADMIN — VANCOMYCIN HYDROCHLORIDE 1000 MG: 1 INJECTION, POWDER, LYOPHILIZED, FOR SOLUTION INTRAVENOUS at 19:48

## 2024-03-08 RX ADMIN — ROCURONIUM BROMIDE 20 MG: 10 INJECTION, SOLUTION INTRAVENOUS at 15:05

## 2024-03-08 RX ADMIN — Medication 100 MG: at 14:36

## 2024-03-08 RX ADMIN — METRONIDAZOLE 500 MG: 500 INJECTION, SOLUTION INTRAVENOUS at 12:43

## 2024-03-08 RX ADMIN — ROCURONIUM BROMIDE 20 MG: 10 INJECTION, SOLUTION INTRAVENOUS at 15:13

## 2024-03-08 RX ADMIN — SODIUM CHLORIDE, PRESERVATIVE FREE 10 ML: 5 INJECTION INTRAVENOUS at 20:28

## 2024-03-08 RX ADMIN — OXCARBAZEPINE 300 MG: 300 TABLET, FILM COATED ORAL at 09:51

## 2024-03-08 RX ADMIN — Medication 1 MG: at 15:04

## 2024-03-08 RX ADMIN — BACLOFEN 20 MG: 10 TABLET ORAL at 20:25

## 2024-03-08 RX ADMIN — VANCOMYCIN HYDROCHLORIDE 1000 MG: 1 INJECTION, POWDER, LYOPHILIZED, FOR SOLUTION INTRAVENOUS at 09:56

## 2024-03-08 RX ADMIN — DULOXETINE HYDROCHLORIDE 60 MG: 30 CAPSULE, DELAYED RELEASE ORAL at 09:41

## 2024-03-08 RX ADMIN — CEFTAZIDIME, AVIBACTAM 2.5 G: 2; .5 POWDER, FOR SOLUTION INTRAVENOUS at 10:51

## 2024-03-08 RX ADMIN — CEFTAZIDIME, AVIBACTAM 2.5 G: 2; .5 POWDER, FOR SOLUTION INTRAVENOUS at 01:07

## 2024-03-08 RX ADMIN — OXYCODONE 5 MG: 5 TABLET ORAL at 04:29

## 2024-03-08 RX ADMIN — OXCARBAZEPINE 300 MG: 300 TABLET, FILM COATED ORAL at 10:52

## 2024-03-08 RX ADMIN — ALPRAZOLAM 0.5 MG: 0.25 TABLET ORAL at 01:02

## 2024-03-08 RX ADMIN — LEVOTHYROXINE SODIUM 100 MCG: 0.07 TABLET ORAL at 09:41

## 2024-03-08 RX ADMIN — Medication 1 CAPSULE: at 20:25

## 2024-03-08 ASSESSMENT — PAIN SCALES - GENERAL
PAINLEVEL_OUTOF10: 7
PAINLEVEL_OUTOF10: 10
PAINLEVEL_OUTOF10: 8
PAINLEVEL_OUTOF10: 7
PAINLEVEL_OUTOF10: 10
PAINLEVEL_OUTOF10: 5
PAINLEVEL_OUTOF10: 8

## 2024-03-08 ASSESSMENT — PAIN DESCRIPTION - LOCATION
LOCATION: BACK;HIP
LOCATION: HIP

## 2024-03-08 ASSESSMENT — PAIN SCALES - WONG BAKER
WONGBAKER_NUMERICALRESPONSE: NO HURT
WONGBAKER_NUMERICALRESPONSE: 0

## 2024-03-08 ASSESSMENT — PAIN DESCRIPTION - DESCRIPTORS
DESCRIPTORS: ACHING
DESCRIPTORS: ACHING;SHARP
DESCRIPTORS: ACHING;SORE
DESCRIPTORS: ACHING

## 2024-03-08 ASSESSMENT — PAIN DESCRIPTION - ORIENTATION
ORIENTATION: LEFT

## 2024-03-08 ASSESSMENT — PAIN - FUNCTIONAL ASSESSMENT: PAIN_FUNCTIONAL_ASSESSMENT: ACTIVITIES ARE NOT PREVENTED

## 2024-03-08 NOTE — PROGRESS NOTES
2200 Wound Vac continues to alarm, dressing reinforced, cannister exchanged. Appears to be working fine.  2332 Wound vac malfunctioning. Notified Dr Ramirez. He advised to get wound vac working ASAP.  0040 Wound vac suction tubing replace, trasnparent dressing reinforced. Wound vac appears to be functioning.    0020 Notifed Maykel-pt requested imodium for multiple Bms. Stools were ranging from formed to soft/loose. Orders given for Cdiff R/O.

## 2024-03-08 NOTE — PROGRESS NOTES
Report given to PACU nurse Valerie, patient alert and oriented x 4, not in any form of distress patient transported per bed going to OR.

## 2024-03-08 NOTE — PROGRESS NOTES
Infectious Disease Progress Note           Subjective:   Stable, NPO for debridement of left hip by ortho. No acute events since last seen, intermittent left hip pain. Reported diarrhea overnight, C.diff is being ruled out   Objective:   Physical Exam:     BP (!) 149/73   Pulse 84   Temp 98.3 °F (36.8 °C) (Oral)   Resp 20   Ht 1.575 m (5' 2\")   Wt 70.3 kg (155 lb)   SpO2 94%   BMI 28.35 kg/m²    O2 Device: None (Room air)    Temp (24hrs), Av.4 °F (36.9 °C), Min:97.8 °F (36.6 °C), Max:98.8 °F (37.1 °C)    701 - 1900  In: 700 [I.V.:700]  Out: 100    1901 -  0700  In: 5144.4 [P.O.:1440; I.V.:352.9]  Out: 1930 [Urine:1380; Drains:550]    General: NAD, AAO x 4  HEENT: GIORGI, Moist mucosa   Lungs: decreased at the bases, no wheeze/rhonchi   Heart: S1S2+, RRR, no murmur  Abdo: Soft, NT, ND, +BS   : No higginbotham cath   Exts left anterior hip dressing in place   Skin: Wound Vac on lower back     Data Review:       Recent Days:    Recent Labs     24  0652 24  0537 24  0718   WBC 8.9 6.6 6.9   HGB 6.6* 7.0* 7.4*   HCT 22.3* 22.8* 24.9*    340 411*       Recent Labs     24  0652 24  0537 24  0718   BUN 11 9 8   CREATININE 0.41* 0.40* 0.37*         Lab Results   Component Value Date/Time    CRP 10.80 2024 06:12 AM     Microbiology     Results       Procedure Component Value Units Date/Time    Culture, Tissue [[id number]] Collected: 24 1528    Order Status: Sent Specimen: Tissue from Joint, Hip Updated: 24 1603    Clostridium Difficile Toxin/Antigen [[id number]]     Order Status: Sent Specimen: Stool     Culture, Tissue [[id number]] Collected: 24 1319    Order Status: Completed Specimen: Tissue Updated: 24 0656     Special Requests No Special Requests        Gram stain Occasional WBCs seen         No organisms seen        Culture       No growth thus far, holding 14 days.          Culture, Wound  Not detected     Candida auris by PCR Not detected     Candida glabrata Not detected     Candida krusei by PCR Not detected     Candida parapsilosis by PCR Not detected     Candida tropicalis by PCR Not detected     Cryptococcus neoformans/gattii by PCR Not detected     Resistant gene targets          [employer] test comment False positive results may rarely occur. Correlate with     Comment: clinical,epidemiologic,  and other laboratory findings  Please see BCID Interpretation Guide in EPIC Links         Blood Culture 1 [[id number]] Collected: 02/28/24 1447    Order Status: Completed Specimen: Blood Updated: 03/05/24 2010     Special Requests --        No Special Requests  Left       Culture No growth 6 days       Blood Culture 2 [[id number]]  (Abnormal) Collected: 02/28/24 1447    Order Status: Completed Specimen: Blood Updated: 03/01/24 1504     Special Requests No Special Requests        Culture       Staphylococcus species, coagulase negative growing in 1 of 2 bottles drawn No Site Indicated            (NOTE) [facility] CL CLT DEBORAHRADHA JARAMILLO RN [id number] 2/29/24 LITA             Diagnostic   CT PELVIS W WO CONTRAST Additional Contrast? None    Result Date: 2/29/2024  1. Findings compatible with chronic osteomyelitis and septic arthritis of the left hip with collapse of the left femoral head. Diffuse osteosclerosis of the left hemipelvis can be seen with chronic osteomyelitis 2. Loosening of the hardware in the L3 L4 L5 and left pelvis 3. Large decubitus wound overlying the lumbosacral junction extending to the underlying bone. A drainable fluid collection is not demonstrated        Assessment/Plan     Septic arthritis/Chronic osteomyelitis involving left hip, admitted for debridement         Chronic osteomyelitis and septic arthritis of the left hip with collapse of the left femoral head on CT (02/29)        S/p left hip surgery on 03/04, intra-op Cxs pending, no organisms on Gram stain        Underwent repeat left hip  debridement today, tissue Cxs sent, will follow         Continue current empiric antibiotics, Avycaz and Vanc         Routine labs in the morning          2. Staph species bacteremia, isolated from 1/2, extensive hardware in the spine       Repeat blood Cx from 03/01 is neg, suspected skin contaminant     3. Chronic lower back infection, s/p multiple back surgeries w underlying hardware and chronic open wound       Repeated isolation of P.aeruginosa from wound Cx, most recently on 02/12, Bacteroides species also isolated. Continue antibiotics as above     4  Diarrhea, C diff is being ruled out, already on probiotics     Promise Garrison MD    3/8/2024

## 2024-03-08 NOTE — CARE COORDINATION
CM reviewed chart. Plans for another surgery today. Patient has been accepted by [facility] pending insurance Auth and bed availability.

## 2024-03-08 NOTE — PROGRESS NOTES
OT tx attempted at 1305 however pt in OR. Will continue to follow and re-attempt OT at a later time. Thank you.

## 2024-03-08 NOTE — PROGRESS NOTES
Negative Pressure    NAME:  Paula Renteria  YOB: 1970  MEDICAL RECORD NUMBER:  [id number]  DATE:  2/28/2024    Applied Negative Pressure to non-healing surgical incision to lumbar.  [x] Applied skin barrier prep to janusz-wound.   [x] Cut strips of plastic drape to picture frame wound so that janusz-wound is     covered with the drape.   [] If bridging dressing to less prominent site, cover any intact skin that will come in contact with the Negative Pressure Therapy sponge, gauze or channel drain with plastic drape.  The sponge should never touch intact skin.   [x] Cut sponge, gauze or channel drain to size which will fit into the wound/ulcer bed without being forced.   [x] Be sure the sponge is large enough to hold the entire round plastic flange which is attached to the tubing.  Never allow flange to be larger than the sponge or it will produce suction damaging intact skin.  Total number of individual pieces of foam used within the wound bed: 1 x white foam and 1 x black  foam  [] If bridging the dressing away from the primary site, be sure the bridge leads to a piece of sponge large enough to hold the entire flange without allowing any of the flange to overlap onto intact skin.   [x] Covered sponge, gauze or channel drain with plastic drape.   [x] Cut a hole in this plastic drape directly over the sponge the same size as the plastic drain tubing.   [x] Removed plastic liner from flange and apply it directly over the hole you cut.   [x] Removed the plastic cover from the flange.   [x] Attached the tubing to the wound/ulcer Negative Pressure Therapy and turn it on to be sure a vacuum is created and that there are no leaks.   [x] If air leaks occur, use plastic drape to patch them.   [] Secured Negative Pressure Therapy dressing with ace wrap loosely if located on an extremity. Maintain tubing outside of ace wrap. Tubing must not exert pressure on intact skin.    Applied per  Guidelines  Patient

## 2024-03-08 NOTE — ANESTHESIA PRE PROCEDURE
Department of Anesthesiology  Preprocedure Note       Name:  Paula Renteria   Age:  53 y.o.  :  1970                                          MRN:  [id number]         Date:  3/8/2024      Surgeon: Surgeon(s):  Haris Ramirez MD    Procedure: Procedure(s):  INCISION AND DRAINAGE AND DEBRIDEMENT HIP    Medications prior to admission:   Prior to Admission medications    Medication Sig Start Date End Date Taking? Authorizing Provider   ondansetron (ZOFRAN) 4 MG tablet Take 1 tablet by mouth every 8 hours as needed for Nausea or Vomiting   Yes Lambert Robles MD   lidocaine 4 % external patch Place 1 patch onto the skin daily   Yes Lambert Robles MD   trolamine salicylate (ASPERCREME) 10 % cream Apply topically as needed for Pain Apply topically as needed.   Yes Lambert Robles MD   albuterol sulfate HFA (PROVENTIL;VENTOLIN;PROAIR) 108 (90 Base) MCG/ACT inhaler INHALE TWO PUFFS BY MOUTH every 4 hours AS NEEDED 7/10/18   Lambert Robles MD   XANAX 0.5 MG tablet Take 1 tablet by mouth nightly as needed. 19   Lambert Robles MD   docusate (COLACE, DULCOLAX) 100 MG CAPS Take 100 mg by mouth daily    Lambert Robles MD   DULoxetine (CYMBALTA) 60 MG extended release capsule Take 1 capsule by mouth 2 times daily 3/22/22   Lambert Robles MD   oxyCODONE (ROXICODONE) 5 MG immediate release tablet Take 1 tablet by mouth every 4 hours as needed for Pain.    Lambert Robles MD   OXcarbazepine (TRILEPTAL) 150 MG tablet Take 100 mg by mouth 3 times daily    Lambert Robles MD   baclofen (LIORESAL) 10 MG tablet Take 5 tablets by mouth 2 times daily  Patient not taking: Reported on 2/15/2024    Lambert Robles MD   famotidine (PEPCID) 20 MG tablet Take 3 tablets by mouth 2 times daily    Lambert Robles MD   levothyroxine (SYNTHROID) 100 MCG tablet Take 1 tablet by mouth Daily    Lambert Robles MD       Current medications:    Current

## 2024-03-08 NOTE — OP NOTE
Operative Note      Patient: Paula Renteria  YOB: 1970  MRN: [id number]    Date of Procedure: 3/8/2024    Pre-Op Diagnosis Codes:     * Pyogenic arthritis of left hip, due to unspecified organism ([facility]) [M00.9]     * Direct infection of unspecified hip in infectious and parasitic diseases classified elsewhere ([facility]) [M01.X59]     * Inflammatory reaction due to internal fixation device of spine, initial encounter ([facility]) [T84.63XA]    Post-Op Diagnosis: Same       Procedure(s):  EXCISIONAL DEBRIDEMENT OF LEFT HIP WOUND    Surgeon(s):  Haris Ramirez MD    Assistant:   Surgical Assistant: Tim Rodriguez    Anesthesia: Choice - General by Eris    Estimated Blood Loss (mL): less than 100     Complications: None    Specimens:   ID Type Source Tests Collected by Time Destination   A : Left Hip Tissue Culture Tissue Joint, Hip CULTURE, TISSUE Haris Ramirez MD 3/8/2024 1528        Implants:  Implant Name Type Inv. Item Serial No.  Lot No. LRB No. Used Action   GRAFT BNE SUB 10CC BEAD 25CC CA SULPHATE RAP SET W/ INDIV - SNA  GRAFT BNE SUB 10CC BEAD 25CC CA SULPHATE RAP SET W/ INDIV NA [employer] [id number] Left 1 Implanted         Findings: 1.  Minimal seroma in the joint space  2.  Organizing tissue in the socket, minimal tissue necrosis, no purulence  3.  Tight, scarred and contracted periarticular tissues.      Preop Note: The patient is a 53 year-old lady with a long and complex history of multiple surgeries on her spine.  She underwent a lumbar spine fusion almost 12 years ago which was followed by a postoperative wound infection.  She had a prolonged treatment of this problem, including surgeries at [facility].  She finally came under the treatment of Dr. Newsome who was with [facility] at that time.  Her infection reportedly became controlled.  She apparently had removal of all hardware in 2019.  Due to progressive symptoms and deformity, she subsequently underwent revision  called.  Examination at this time showed skin closure with nylon sutures, and the wound VAC sponge visible in the distal end of the incision.  Tissues were edematous and the skin was intact, which showed some skin blistering on the anterior thigh, due to previous vacuum dressing.  The previous skin incision was reopened with removal of skin sutures.  Wound VAC sponges were removed.  PDS suture was removed from the deep fascial closure.  The joint space was accessed, and the previously placed antibiotic beads were removed.  These were thoroughly irrigated, to be used at the end of the procedure.  The joint was exposed with appropriate retraction, and thorough debridement was performed using Smith curettes, rongeur and pulse irrigation with 6 L of normal saline.  Tissues were obtained and sent for tissue cultures.  After complete debridement, I placed a medium large Hemovac drain into the joint.  I inserted 10 cc of microcrystalline calcium sulfate stimulan, mixed with 3 g of vancomycin powder, fashion and 2 medium sized beads.  The antibiotic cement beads were now replaced into the joint space.  The deep fascia was closed with #1 PDS suture.  Vancomycin powder 1 g was placed in the subcutaneous adipose plane.  The skin was meticulously and atraumatically closed with interrupted vertical mattress sutures using 3-0 nylon.  A vacuum Prevena dressing was applied.  The patient tolerated the procedure and was transferred to the bed in a stable condition.  A medium hip abduction pillow was requested and applied.  Postoperatively, her antibiotic and medical management will continue as previously.  Touchdown weightbearing on the left lower extremity for 4-6 weeks.    Electronically signed by Haris Ramirez MD on 3/8/2024 at 4:36 PM

## 2024-03-08 NOTE — PROGRESS NOTES
Hospitalist Progress Note    NAME:   Paula Renteria   : 1970   MRN: [id number]     Date/Time: 3/8/2024 11:48 AM  Patient PCP: Lion Liu MD    Estimated discharge date: >48 hours  Barriers: OR for I&D on       HOSPITAL COURSE:    Paula Renteria is a 53 y.o.  female with PMHx significant for several lumbar fusion surgeries, most recently a fusion in  that required several washout surgeries and has had an open wound since.  Patient admitted 24  because her orthopedic doctor, Dr. Ramirez, recommended her to come to the ER for septic arthritis of left hip. CT shows septic arthritis of the left hip with collapse of the left femoral head.  Diffuse osteosclerosis of the left hemipelvis can be seen with chronic osteomyelitis.  Culture of tissue from 2024 shows moderate Pseudomonas MDRO and moderate bacteroides thetaiotaomicron beta lactamase positive.   ID consulted.  ID started patient on Avycaz.  PICC line placed.  Ortho consulted. upta perioperative risk shows 0.5% risk of MI or cardiac arrest. [employer] Risk calculator shows an average risk of serious complications including surgical site infections, UTI, pneumonia.  Below average chance of VTE, renal failure, death.  Patient medically cleared for procedure. Taken to OR 3/4/2024 for debridement of left hip with girdlestone resection.     Assessment / Plan:    Septic arthritis/chronic osteomyelitis involving the left hip  - S/p left hip debridement on . Also had Intra-Op antibiotic cement not articulating spacer placed in the left hip.  - Intra-op Cxs no growth to date, no organisms on Gram stain   - ID on board.  Patient on vancomycin and Avycaz.  - Orthopedic surgery following  - Scheduled for I&D with Dr. Ramirez on      Staph species bacteremia  - Isolated from , extensive hardware in the spine   - Repeat blood Cx from  is neg, continue on Vanc for now   - Chronic lower back infection,  palpitations.   Gastrointestinal:  Negative for abdominal pain, constipation, diarrhea and nausea.   Genitourinary:  Negative for dysuria and frequency.   Musculoskeletal:  Positive for back pain.   Skin:  Negative for rash.   Neurological:  Negative for dizziness and headaches.   Psychiatric/Behavioral:  The patient is not nervous/anxious.         PHYSICAL EXAM:  Physical Exam  Constitutional:       General: She is not in acute distress.  HENT:      Head: Normocephalic and atraumatic.   Eyes:      Extraocular Movements: Extraocular movements intact.      Pupils: Pupils are equal, round, and reactive to light.   Cardiovascular:      Rate and Rhythm: Normal rate and regular rhythm.      Heart sounds: No murmur heard.     No friction rub. No gallop.   Pulmonary:      Effort: Pulmonary effort is normal.      Breath sounds: No wheezing, rhonchi or rales.   Abdominal:      General: Abdomen is flat. There is no distension.      Palpations: Abdomen is soft.      Tenderness: There is no abdominal tenderness.   Musculoskeletal:      Comments: Wound VAC on lower back   Neurological:      Mental Status: She is alert and oriented to person, place, and time.   Psychiatric:         Behavior: Behavior normal.          Reviewed most current lab test results and cultures  YES  Reviewed most current radiology test results   YES  Review and summation of old records today    NO  Reviewed patient's current orders and MAR    YES  PMH/SH reviewed - no change compared to H&P  ________________________________________________________________________  Care Plan discussed with:    Comments   Patient x    Family      RN x    Care Manager     Consultant                        Multidiciplinary team rounds were held today with , nursing, pharmacist and clinical coordinator.  Patient's plan of care was discussed; medications were reviewed and discharge planning was addressed.      ________________________________________________________________________  Total NON critical care TIME:  35  Minutes    Total CRITICAL CARE TIME Spent:   Minutes non procedure based      Comments   >50% of visit spent in counseling and coordination of care     ________________________________________________________________________  Bolivar Ruiz PA-C     Procedures: see electronic medical records for all procedures/Xrays and details which were not copied into this note but were reviewed prior to creation of Plan.      LABS:  I reviewed today's most current labs and imaging studies.  Pertinent labs include:  Recent Labs     03/06/24 0652 03/07/24 0537 03/08/24 0718   WBC 8.9 6.6 6.9   HGB 6.6* 7.0* 7.4*   HCT 22.3* 22.8* 24.9*    340 411*       Recent Labs     03/06/24 0652 03/07/24 0537 03/08/24 0718    140 140   K 3.9 4.0 3.7   * 109* 110*   CO2 27 26 27   BUN 11 9 8         Signed: Bolivar Ruiz PA-C

## 2024-03-08 NOTE — PROGRESS NOTES
PREOP NOTE:    Pre-Op Diagnosis:    Status post excisional debridement of left hip, Girdlestone resection and none articulating antibiotic cement spacer - POD #4    Pyogenic arthritis of left hip, due to unspecified organism ([facility]) [M00.9]  Direct infection of unspecified hip in infectious and parasitic diseases classified elsewhere ([facility]) [M01.X59]  Inflammatory reaction due to internal fixation device of spine, initial encounter ([facility]) [T84.63XA]    Procedure Planned:  Second Look debridement of left hip, possible exchange of antibiotic cement spacer    Surgeon: Dr. Ramirez    Consent: I had a detailed discussion with the patient about her underlying diagnosis, its natural history and treatment options.  She is currently undergoing serial debridements and placement of an antibiotic cement spacer in her left hip, in conjunction with resection Girdlestone arthroplasty for addressing a chronic deep articular infection/septic arthritis of her left hip joint, likely secondary to a chronic infection of her spinal hardware.  Potential risks of the surgical procedure were discussed again, and the patient wished to proceed with the recommended plan.

## 2024-03-08 NOTE — PROGRESS NOTES
Attempted PT  1352 however pt off the floor in OR. Will continue to follow and attempt at a later time. Thank you.

## 2024-03-09 LAB
ANION GAP SERPL CALC-SCNC: 4 MMOL/L (ref 5–15)
BUN SERPL-MCNC: 8 MG/DL (ref 6–20)
BUN/CREAT SERPL: 20 (ref 12–20)
CA-I BLD-MCNC: 8.2 MG/DL (ref 8.5–10.1)
CHLORIDE SERPL-SCNC: 112 MMOL/L (ref 97–108)
CO2 SERPL-SCNC: 27 MMOL/L (ref 21–32)
CREAT SERPL-MCNC: 0.41 MG/DL (ref 0.55–1.02)
CRP SERPL-MCNC: 8.56 MG/DL (ref 0–0.3)
ERYTHROCYTE [DISTWIDTH] IN BLOOD BY AUTOMATED COUNT: 19.7 % (ref 11.5–14.5)
GLUCOSE SERPL-MCNC: 87 MG/DL (ref 65–100)
HCT VFR BLD AUTO: 26.2 % (ref 35–47)
HGB BLD-MCNC: 7.5 G/DL (ref 11.5–16)
MCH RBC QN AUTO: 23.1 PG (ref 26–34)
MCHC RBC AUTO-ENTMCNC: 28.6 G/DL (ref 30–36.5)
MCV RBC AUTO: 80.9 FL (ref 80–99)
NRBC # BLD: 0.02 K/UL (ref 0–0.01)
NRBC BLD-RTO: 0.3 PER 100 WBC
PLATELET # BLD AUTO: 389 K/UL (ref 150–400)
PMV BLD AUTO: 9.3 FL (ref 8.9–12.9)
POTASSIUM SERPL-SCNC: 3.9 MMOL/L (ref 3.5–5.1)
RBC # BLD AUTO: 3.24 M/UL (ref 3.8–5.2)
SODIUM SERPL-SCNC: 143 MMOL/L (ref 136–145)
WBC # BLD AUTO: 6.6 K/UL (ref 3.6–11)

## 2024-03-09 PROCEDURE — 1100000000 HC RM PRIVATE

## 2024-03-09 PROCEDURE — 6370000000 HC RX 637 (ALT 250 FOR IP): Performed by: ORTHOPAEDIC SURGERY

## 2024-03-09 PROCEDURE — 36415 COLL VENOUS BLD VENIPUNCTURE: CPT

## 2024-03-09 PROCEDURE — 97530 THERAPEUTIC ACTIVITIES: CPT

## 2024-03-09 PROCEDURE — 97608 NEG PRS WND THER NDME>50SQCM: CPT

## 2024-03-09 PROCEDURE — 80048 BASIC METABOLIC PNL TOTAL CA: CPT

## 2024-03-09 PROCEDURE — 85027 COMPLETE CBC AUTOMATED: CPT

## 2024-03-09 PROCEDURE — 86140 C-REACTIVE PROTEIN: CPT

## 2024-03-09 PROCEDURE — 6360000002 HC RX W HCPCS: Performed by: HOSPITALIST

## 2024-03-09 PROCEDURE — 2500000003 HC RX 250 WO HCPCS: Performed by: HOSPITALIST

## 2024-03-09 PROCEDURE — 6360000002 HC RX W HCPCS: Performed by: ORTHOPAEDIC SURGERY

## 2024-03-09 PROCEDURE — 2580000003 HC RX 258: Performed by: ORTHOPAEDIC SURGERY

## 2024-03-09 PROCEDURE — 99232 SBSQ HOSP IP/OBS MODERATE 35: CPT | Performed by: INTERNAL MEDICINE

## 2024-03-09 RX ADMIN — LEVOTHYROXINE SODIUM 100 MCG: 0.07 TABLET ORAL at 07:44

## 2024-03-09 RX ADMIN — VANCOMYCIN HYDROCHLORIDE 1000 MG: 1 INJECTION, POWDER, LYOPHILIZED, FOR SOLUTION INTRAVENOUS at 03:05

## 2024-03-09 RX ADMIN — METRONIDAZOLE 500 MG: 500 INJECTION, SOLUTION INTRAVENOUS at 10:17

## 2024-03-09 RX ADMIN — OXCARBAZEPINE 300 MG: 300 TABLET, FILM COATED ORAL at 10:18

## 2024-03-09 RX ADMIN — OXYCODONE 5 MG: 5 TABLET ORAL at 23:14

## 2024-03-09 RX ADMIN — ACETAMINOPHEN 1000 MG: 500 TABLET ORAL at 07:44

## 2024-03-09 RX ADMIN — THERA TABS 1 TABLET: TAB at 10:11

## 2024-03-09 RX ADMIN — SODIUM CHLORIDE, PRESERVATIVE FREE 10 ML: 5 INJECTION INTRAVENOUS at 20:26

## 2024-03-09 RX ADMIN — BACLOFEN 20 MG: 10 TABLET ORAL at 10:11

## 2024-03-09 RX ADMIN — ALTEPLASE 1 MG: 2.2 INJECTION, POWDER, LYOPHILIZED, FOR SOLUTION INTRAVENOUS at 02:25

## 2024-03-09 RX ADMIN — CARVEDILOL 6.25 MG: 3.12 TABLET, FILM COATED ORAL at 15:54

## 2024-03-09 RX ADMIN — METRONIDAZOLE 500 MG: 500 INJECTION, SOLUTION INTRAVENOUS at 02:27

## 2024-03-09 RX ADMIN — OXYCODONE HYDROCHLORIDE AND ACETAMINOPHEN 1000 MG: 500 TABLET ORAL at 10:10

## 2024-03-09 RX ADMIN — Medication 1 CAPSULE: at 10:11

## 2024-03-09 RX ADMIN — CARVEDILOL 6.25 MG: 3.12 TABLET, FILM COATED ORAL at 10:11

## 2024-03-09 RX ADMIN — ACETAMINOPHEN 1000 MG: 500 TABLET ORAL at 01:19

## 2024-03-09 RX ADMIN — VANCOMYCIN HYDROCHLORIDE 1000 MG: 1 INJECTION, POWDER, LYOPHILIZED, FOR SOLUTION INTRAVENOUS at 17:55

## 2024-03-09 RX ADMIN — OXYCODONE 5 MG: 5 TABLET ORAL at 10:09

## 2024-03-09 RX ADMIN — ALPRAZOLAM 0.5 MG: 0.25 TABLET ORAL at 23:58

## 2024-03-09 RX ADMIN — VANCOMYCIN HYDROCHLORIDE 1000 MG: 1 INJECTION, POWDER, LYOPHILIZED, FOR SOLUTION INTRAVENOUS at 10:08

## 2024-03-09 RX ADMIN — DULOXETINE HYDROCHLORIDE 60 MG: 30 CAPSULE, DELAYED RELEASE ORAL at 20:26

## 2024-03-09 RX ADMIN — CEFTAZIDIME, AVIBACTAM 2.5 G: 2; .5 POWDER, FOR SOLUTION INTRAVENOUS at 13:00

## 2024-03-09 RX ADMIN — OXYCODONE HYDROCHLORIDE AND ACETAMINOPHEN 1000 MG: 500 TABLET ORAL at 20:26

## 2024-03-09 RX ADMIN — DULOXETINE HYDROCHLORIDE 60 MG: 30 CAPSULE, DELAYED RELEASE ORAL at 10:10

## 2024-03-09 RX ADMIN — HYDROMORPHONE HYDROCHLORIDE 1 MG: 1 INJECTION, SOLUTION INTRAMUSCULAR; INTRAVENOUS; SUBCUTANEOUS at 11:49

## 2024-03-09 RX ADMIN — Medication 1 CAPSULE: at 20:26

## 2024-03-09 RX ADMIN — ACETAMINOPHEN 1000 MG: 500 TABLET ORAL at 11:49

## 2024-03-09 RX ADMIN — HYDROMORPHONE HYDROCHLORIDE 1 MG: 1 INJECTION, SOLUTION INTRAMUSCULAR; INTRAVENOUS; SUBCUTANEOUS at 15:51

## 2024-03-09 RX ADMIN — FERROUS SULFATE TAB 325 MG (65 MG ELEMENTAL FE) 325 MG: 325 (65 FE) TAB at 15:55

## 2024-03-09 RX ADMIN — OXCARBAZEPINE 300 MG: 300 TABLET, FILM COATED ORAL at 15:55

## 2024-03-09 RX ADMIN — FERROUS SULFATE TAB 325 MG (65 MG ELEMENTAL FE) 325 MG: 325 (65 FE) TAB at 10:11

## 2024-03-09 RX ADMIN — OXCARBAZEPINE 300 MG: 300 TABLET, FILM COATED ORAL at 10:10

## 2024-03-09 RX ADMIN — HYDROMORPHONE HYDROCHLORIDE 1 MG: 1 INJECTION, SOLUTION INTRAMUSCULAR; INTRAVENOUS; SUBCUTANEOUS at 07:49

## 2024-03-09 RX ADMIN — BACLOFEN 20 MG: 10 TABLET ORAL at 20:26

## 2024-03-09 RX ADMIN — METRONIDAZOLE 500 MG: 500 INJECTION, SOLUTION INTRAVENOUS at 18:00

## 2024-03-09 RX ADMIN — CEFTAZIDIME, AVIBACTAM 2.5 G: 2; .5 POWDER, FOR SOLUTION INTRAVENOUS at 18:53

## 2024-03-09 RX ADMIN — ACETAMINOPHEN 1000 MG: 500 TABLET ORAL at 17:21

## 2024-03-09 RX ADMIN — HYDROMORPHONE HYDROCHLORIDE 1 MG: 1 INJECTION, SOLUTION INTRAMUSCULAR; INTRAVENOUS; SUBCUTANEOUS at 01:18

## 2024-03-09 RX ADMIN — HYDROMORPHONE HYDROCHLORIDE 1 MG: 1 INJECTION, SOLUTION INTRAMUSCULAR; INTRAVENOUS; SUBCUTANEOUS at 20:24

## 2024-03-09 RX ADMIN — CEFTAZIDIME, AVIBACTAM 2.5 G: 2; .5 POWDER, FOR SOLUTION INTRAVENOUS at 01:31

## 2024-03-09 ASSESSMENT — PAIN DESCRIPTION - LOCATION
LOCATION: HIP;LEG
LOCATION: HIP
LOCATION: HIP;BACK
LOCATION: HIP;KNEE
LOCATION: HIP;KNEE
LOCATION: HIP
LOCATION: HIP
LOCATION: HIP;KNEE
LOCATION: HIP;KNEE

## 2024-03-09 ASSESSMENT — PAIN DESCRIPTION - DESCRIPTORS
DESCRIPTORS: ACHING;DISCOMFORT
DESCRIPTORS: ACHING
DESCRIPTORS: THROBBING;ACHING
DESCRIPTORS: ACHING;DISCOMFORT
DESCRIPTORS: ACHING;SHARP
DESCRIPTORS: ACHING;BURNING;DISCOMFORT
DESCRIPTORS: ACHING;DISCOMFORT
DESCRIPTORS: ACHING

## 2024-03-09 ASSESSMENT — PAIN SCALES - GENERAL
PAINLEVEL_OUTOF10: 10
PAINLEVEL_OUTOF10: 7
PAINLEVEL_OUTOF10: 7
PAINLEVEL_OUTOF10: 8
PAINLEVEL_OUTOF10: 7
PAINLEVEL_OUTOF10: 7
PAINLEVEL_OUTOF10: 6
PAINLEVEL_OUTOF10: 8
PAINLEVEL_OUTOF10: 10
PAINLEVEL_OUTOF10: 3
PAINLEVEL_OUTOF10: 6

## 2024-03-09 ASSESSMENT — PAIN DESCRIPTION - ORIENTATION
ORIENTATION: LEFT

## 2024-03-09 ASSESSMENT — PAIN - FUNCTIONAL ASSESSMENT
PAIN_FUNCTIONAL_ASSESSMENT: ACTIVITIES ARE NOT PREVENTED
PAIN_FUNCTIONAL_ASSESSMENT: ACTIVITIES ARE NOT PREVENTED

## 2024-03-09 NOTE — PROGRESS NOTES
Infectious Disease Progress Note           Subjective:   Stable, denies new complaints, no acute events since last seen, remains stable. Reported left hip pain during my assessment.   Objective:   Physical Exam:     /65   Pulse 87   Temp 99 °F (37.2 °C)   Resp 17   Ht 1.575 m (5' 2\")   Wt 70.3 kg (155 lb)   SpO2 95%   BMI 28.35 kg/m²    O2 Device: None (Room air)    Temp (24hrs), Av.3 °F (36.8 °C), Min:97.7 °F (36.5 °C), Max:99 °F (37.2 °C)    No intake/output data recorded.    1901 -  0700  In: 725.8 [I.V.:725.8]  Out: 1050 [Urine:500; Drains:450]    General: NAD, AAO x 4  HEENT: GIORGI, Moist mucosa   Lungs: decreased at the bases, no wheeze/rhonchi   Heart: S1S2+, RRR, no murmur  Abdo: Soft, NT, ND, +BS   : No higginbotham cath   Exts left anterior hip dressing in place   Skin: Wound Vac on lower back     Data Review:       Recent Days:    Recent Labs     24  0537 24  0718 24  0748   WBC 6.6 6.9 6.6   HGB 7.0* 7.4* 7.5*   HCT 22.8* 24.9* 26.2*    411* 389       Recent Labs     24  0537 24  0718 24  0748   BUN 9 8 8   CREATININE 0.40* 0.37* 0.41*         Lab Results   Component Value Date/Time    CRP 8.56 2024 07:48 AM     Microbiology     Results       Procedure Component Value Units Date/Time    Culture, Tissue [[id number]] Collected: 24 1528    Order Status: Sent Specimen: Tissue from Joint, Hip Updated: 24 1603    Clostridium Difficile Toxin/Antigen [[id number]]     Order Status: Sent Specimen: Stool     Culture, Tissue [[id number]] Collected: 24 1319    Order Status: Completed Specimen: Tissue Updated: 24 0656     Special Requests No Special Requests        Gram stain Occasional WBCs seen         No organisms seen        Culture       No growth thus far, holding 14 days.          Culture, Wound [[id number]] Collected: 24 1309    Order Status: Completed Specimen: Swab from Joint, Hip Updated:  by PCR Not detected     Candida parapsilosis by PCR Not detected     Candida tropicalis by PCR Not detected     Cryptococcus neoformans/gattii by PCR Not detected     Resistant gene targets          [employer] test comment False positive results may rarely occur. Correlate with     Comment: clinical,epidemiologic,  and other laboratory findings  Please see BCID Interpretation Guide in EPIC Links         Blood Culture 1 [[id number]] Collected: 02/28/24 1447    Order Status: Completed Specimen: Blood Updated: 03/05/24 2010     Special Requests --        No Special Requests  Left       Culture No growth 6 days       Blood Culture 2 [[id number]]  (Abnormal) Collected: 02/28/24 1447    Order Status: Completed Specimen: Blood Updated: 03/01/24 1504     Special Requests No Special Requests        Culture       Staphylococcus species, coagulase negative growing in 1 of 2 bottles drawn No Site Indicated            (NOTE) [facility] CLT DEBORAH JARAMILLO RN 1114 2/29/24 LITA             Diagnostic   CT PELVIS W WO CONTRAST Additional Contrast? None    Result Date: 2/29/2024  1. Findings compatible with chronic osteomyelitis and septic arthritis of the left hip with collapse of the left femoral head. Diffuse osteosclerosis of the left hemipelvis can be seen with chronic osteomyelitis 2. Loosening of the hardware in the L3 L4 L5 and left pelvis 3. Large decubitus wound overlying the lumbosacral junction extending to the underlying bone. A drainable fluid collection is not demonstrated        Assessment/Plan     Septic arthritis/Chronic osteomyelitis involving left hip, admitted for debridement         Chronic osteomyelitis and septic arthritis of the left hip with collapse of the left femoral head on CT (02/29)        S/p left hip surgery on 03/04, and on 03/08. Intra-op Cxs are neg         Remains afebrile w a normal WBC on routine labs         Continue empiric Vanc and Avycaz for now, will continue to follow intra-op Cxs        2.

## 2024-03-09 NOTE — PLAN OF CARE
PHYSICAL THERAPY TREATMENT     Patient: Paula Renteria (53 y.o. female)  Date: 3/9/2024  Diagnosis: Pyogenic arthritis of left hip, due to unspecified organism ([facility]) [M00.9]  Pain of left hip [M25.552] Pyogenic arthritis of left hip, due to unspecified organism ([facility])  Procedure(s) (LRB):  INCISION AND DRAINAGE AND DEBRIDEMENT HIP (Left) 1 Day Post-Op  Precautions: Fall Risk, Contact Precautions, Weight Bearing, Bed Alarm     Left Lower Extremity Weight Bearing: Toe Touch Weight Bearing Partial Weight Bearing Percentage Or Pounds: 15 LBS              Recommendations for nursing mobility: Out of bed to chair for meals, Frequent repositioning to prevent skin breakdown, LE elevation for management of edema, Use of BSC for toileting , AD and gt belt for bed to chair , and partial WB on LLE    In place during session: Peripheral IV and Wound vac 2 on LLE, 1 on low back.  Chart, physical therapy assessment, plan of care and goals were reviewed.  ASSESSMENT  Patient continues with skilled PT services and is progressing towards goals. Pt supine upon PT arrival, agreeable to session. Pt A&O x 4. Required encouragement for participation today. Pt with high pain levels. (See below for objective details and assist levels).     Overall pt tolerated session fair today with PT. Increased time required for all mobility. Slight increased assistance required for bed mobility this tx. Improvements noted with sitting balance. Able to complete sit<>stand/chair transfer with Ax1 this tx. Discussed with supervising PT prior to tx, states pt has been upgraded to partial WB, however pt demo's TTWB/NWB this tx secondary to pain.  Will continue to benefit from skilled PT services, and will continue to progress as tolerated. Potential barriers for safe discharge: pt is a high fall risk, pt is not safe to be alone, concern for pt safely navigating or managing the home environment, and pt has new weight bearing restrictions limiting  26

## 2024-03-09 NOTE — PROGRESS NOTES
Hospitalist Progress Note    NAME:   Paula Renteria   : 1970   MRN: [id number]     Date/Time: 3/9/2024 11:03 AM  Patient PCP: Lion Liu MD    Estimated discharge date: >48 hours  Barriers: Orthopedic surgery clearance       HOSPITAL COURSE:    Paula Renteria is a 53 y.o.  female with PMHx significant for several lumbar fusion surgeries, most recently a fusion in  that required several washout surgeries and has had an open wound since.  Patient admitted 24  because her orthopedic doctor, Dr. Ramirez, recommended her to come to the ER for septic arthritis of left hip. CT shows septic arthritis of the left hip with collapse of the left femoral head.  Diffuse osteosclerosis of the left hemipelvis can be seen with chronic osteomyelitis.  Culture of tissue from 2024 shows moderate Pseudomonas MDRO and moderate bacteroides thetaiotaomicron beta lactamase positive.   ID consulted.  ID started patient on Avycaz.  PICC line placed.  Ortho consulted. upta perioperative risk shows 0.5% risk of MI or cardiac arrest. [employer] Risk calculator shows an average risk of serious complications including surgical site infections, UTI, pneumonia.  Below average chance of VTE, renal failure, death.  Patient medically cleared for procedure. Taken to OR 3/4/2024 for debridement of left hip with girdlestone resection. Taken to OR  for left hip debridement.     Assessment / Plan:    Septic arthritis/chronic osteomyelitis involving the left hip  - S/p left hip debridement on . Also had Intra-Op antibiotic cement not articulating spacer placed in the left hip.  - Intra-op Cxs no growth to date, no organisms on Gram stain   - ID on board.  Patient on vancomycin and Avycaz.  - Orthopedic surgery following  - S/p left hip debridement w/ Dr. Ramirez on      Staph species bacteremia  - Isolated from , extensive hardware in the spine   - Repeat blood Cx from  is neg,  continue on Vanc for now   - Chronic lower back infection, underlying hardware w draining fistula  - Repeated isolation of P.aeruginosa from wound Cx, most recently on 02/12, Bacteroides species also isolated   - Continue on Avycaz and metronidazole. Local wound care.     Microcytic anemia  - Severe iron deficiency anemia  - Transfused with PRBC  - Initiated iron supplementation therapy  - Outpatient colonoscopy recommendation          Medical Decision Making:   I personally reviewed labs: CBC, CMP  I personally reviewed imaging: XR pelvis, XR lumbar spine, XR left hip, CT pelvis   Toxic drug monitoring: Vancomycin for nephrotoxicity with daily BMP  Discussed case with: patient, PT, and RN        Code Status: FULL  DVT Prophylaxis: None  GI Prophylaxis: None     Subjective:     Chief Complaint / Reason for Physician Visit    Patient seen and examined sitting in recliner chair next to bedside.  She reports pain is is 5 out of 10. PT in room working with patient.  Discussed with RN events overnight.       Objective:     VITALS:   Last 24hrs VS reviewed since prior progress note. Most recent are:  Patient Vitals for the past 24 hrs:   BP Temp Temp src Pulse Resp SpO2   03/09/24 0805 120/65 99 °F (37.2 °C) -- 87 16 95 %   03/09/24 0205 112/70 97.7 °F (36.5 °C) Oral 73 18 100 %   03/09/24 0148 -- -- -- -- 16 --   03/08/24 2251 (!) 143/71 98.2 °F (36.8 °C) Oral 85 18 96 %   03/08/24 2056 -- -- -- -- 18 --   03/08/24 1946 130/73 98.6 °F (37 °C) Oral 85 18 96 %   03/08/24 1903 -- -- -- -- 18 --   03/08/24 1833 -- -- -- -- 18 --   03/08/24 1719 131/66 98.2 °F (36.8 °C) Oral 85 15 96 %   03/08/24 1710 (!) 156/75 98.1 °F (36.7 °C) Oral 87 20 96 %   03/08/24 1650 (!) 152/86 -- -- 92 16 93 %   03/08/24 1640 (!) 149/73 -- -- 84 20 94 %   03/08/24 1635 (!) 157/73 98.3 °F (36.8 °C) Oral 84 20 96 %   03/08/24 1242 (!) 151/78 98.7 °F (37.1 °C) Oral 78 16 93 %           Intake/Output Summary (Last 24 hours) at 3/9/2024 1103  Last data

## 2024-03-09 NOTE — PROGRESS NOTES
PT attempt. Pt kindly declined participation with PT at this time due to high pain levels. Discussed with nsg, pt just received pain medication. Will return soon to re-attempt tx.

## 2024-03-10 ENCOUNTER — HOSPITAL ENCOUNTER (INPATIENT)
Facility: HOSPITAL | Age: 54
Discharge: HOME | DRG: 481 | End: 2024-03-12
Attending: INTERNAL MEDICINE
Payer: MEDICARE

## 2024-03-10 ENCOUNTER — APPOINTMENT (OUTPATIENT)
Facility: HOSPITAL | Age: 54
DRG: 481 | End: 2024-03-10
Payer: MEDICARE

## 2024-03-10 LAB
ANION GAP SERPL CALC-SCNC: 4 MMOL/L (ref 5–15)
BUN SERPL-MCNC: 14 MG/DL (ref 6–20)
BUN/CREAT SERPL: 32 (ref 12–20)
CA-I BLD-MCNC: 8.6 MG/DL (ref 8.5–10.1)
CHLORIDE SERPL-SCNC: 113 MMOL/L (ref 97–108)
CO2 SERPL-SCNC: 25 MMOL/L (ref 21–32)
CREAT SERPL-MCNC: 0.44 MG/DL (ref 0.55–1.02)
CRP SERPL-MCNC: 4.95 MG/DL (ref 0–0.3)
ERYTHROCYTE [DISTWIDTH] IN BLOOD BY AUTOMATED COUNT: 20.5 % (ref 11.5–14.5)
GLUCOSE SERPL-MCNC: 96 MG/DL (ref 65–100)
HCT VFR BLD AUTO: 23.4 % (ref 35–47)
HCT VFR BLD AUTO: 27.6 % (ref 35–47)
HGB BLD-MCNC: 6.9 G/DL (ref 11.5–16)
HGB BLD-MCNC: 8 G/DL (ref 11.5–16)
MCH RBC QN AUTO: 23.6 PG (ref 26–34)
MCHC RBC AUTO-ENTMCNC: 29.5 G/DL (ref 30–36.5)
MCV RBC AUTO: 80.1 FL (ref 80–99)
NRBC # BLD: 0.02 K/UL (ref 0–0.01)
NRBC BLD-RTO: 0.3 PER 100 WBC
PLATELET # BLD AUTO: 395 K/UL (ref 150–400)
PMV BLD AUTO: 9.2 FL (ref 8.9–12.9)
POTASSIUM SERPL-SCNC: 3.5 MMOL/L (ref 3.5–5.1)
RBC # BLD AUTO: 2.92 M/UL (ref 3.8–5.2)
SODIUM SERPL-SCNC: 142 MMOL/L (ref 136–145)
VANCOMYCIN SERPL-MCNC: 26.8 UG/ML
WBC # BLD AUTO: 6.6 K/UL (ref 3.6–11)

## 2024-03-10 PROCEDURE — 6360000002 HC RX W HCPCS: Performed by: ORTHOPAEDIC SURGERY

## 2024-03-10 PROCEDURE — 2580000003 HC RX 258: Performed by: INTERNAL MEDICINE

## 2024-03-10 PROCEDURE — 99232 SBSQ HOSP IP/OBS MODERATE 35: CPT | Performed by: INTERNAL MEDICINE

## 2024-03-10 PROCEDURE — 85014 HEMATOCRIT: CPT

## 2024-03-10 PROCEDURE — 6360000002 HC RX W HCPCS: Performed by: INTERNAL MEDICINE

## 2024-03-10 PROCEDURE — 80048 BASIC METABOLIC PNL TOTAL CA: CPT

## 2024-03-10 PROCEDURE — 6370000000 HC RX 637 (ALT 250 FOR IP): Performed by: ORTHOPAEDIC SURGERY

## 2024-03-10 PROCEDURE — 93970 EXTREMITY STUDY: CPT

## 2024-03-10 PROCEDURE — 93970 EXTREMITY STUDY: CPT | Performed by: SURGERY

## 2024-03-10 PROCEDURE — 85027 COMPLETE CBC AUTOMATED: CPT

## 2024-03-10 PROCEDURE — 73502 X-RAY EXAM HIP UNI 2-3 VIEWS: CPT

## 2024-03-10 PROCEDURE — 6370000000 HC RX 637 (ALT 250 FOR IP): Performed by: INTERNAL MEDICINE

## 2024-03-10 PROCEDURE — 80202 ASSAY OF VANCOMYCIN: CPT

## 2024-03-10 PROCEDURE — 1100000000 HC RM PRIVATE

## 2024-03-10 PROCEDURE — 85018 HEMOGLOBIN: CPT

## 2024-03-10 PROCEDURE — 2580000003 HC RX 258: Performed by: ORTHOPAEDIC SURGERY

## 2024-03-10 PROCEDURE — 36415 COLL VENOUS BLD VENIPUNCTURE: CPT

## 2024-03-10 PROCEDURE — 86140 C-REACTIVE PROTEIN: CPT

## 2024-03-10 PROCEDURE — 2500000003 HC RX 250 WO HCPCS: Performed by: HOSPITALIST

## 2024-03-10 RX ORDER — FENTANYL CITRATE 50 UG/ML
25 INJECTION, SOLUTION INTRAMUSCULAR; INTRAVENOUS
Status: DISCONTINUED | OUTPATIENT
Start: 2024-03-10 | End: 2024-03-10

## 2024-03-10 RX ORDER — LOPERAMIDE HYDROCHLORIDE 2 MG/1
2 CAPSULE ORAL 4 TIMES DAILY PRN
Status: DISCONTINUED | OUTPATIENT
Start: 2024-03-10 | End: 2024-03-13 | Stop reason: HOSPADM

## 2024-03-10 RX ORDER — HYDROMORPHONE HYDROCHLORIDE 2 MG/ML
1.5 INJECTION, SOLUTION INTRAMUSCULAR; INTRAVENOUS; SUBCUTANEOUS ONCE
Status: COMPLETED | OUTPATIENT
Start: 2024-03-10 | End: 2024-03-10

## 2024-03-10 RX ORDER — KETOROLAC TROMETHAMINE 30 MG/ML
30 INJECTION, SOLUTION INTRAMUSCULAR; INTRAVENOUS ONCE
Status: COMPLETED | OUTPATIENT
Start: 2024-03-10 | End: 2024-03-10

## 2024-03-10 RX ORDER — FENTANYL CITRATE 50 UG/ML
50 INJECTION, SOLUTION INTRAMUSCULAR; INTRAVENOUS ONCE
Status: DISCONTINUED | OUTPATIENT
Start: 2024-03-10 | End: 2024-03-10

## 2024-03-10 RX ORDER — FENTANYL CITRATE 50 UG/ML
25 INJECTION, SOLUTION INTRAMUSCULAR; INTRAVENOUS
Status: DISCONTINUED | OUTPATIENT
Start: 2024-03-10 | End: 2024-03-13 | Stop reason: HOSPADM

## 2024-03-10 RX ORDER — KETOROLAC TROMETHAMINE 30 MG/ML
30 INJECTION, SOLUTION INTRAMUSCULAR; INTRAVENOUS EVERY 6 HOURS PRN
Status: DISCONTINUED | OUTPATIENT
Start: 2024-03-10 | End: 2024-03-13 | Stop reason: HOSPADM

## 2024-03-10 RX ADMIN — OXYCODONE HYDROCHLORIDE AND ACETAMINOPHEN 1000 MG: 500 TABLET ORAL at 09:41

## 2024-03-10 RX ADMIN — ACETAMINOPHEN 1000 MG: 500 TABLET ORAL at 11:58

## 2024-03-10 RX ADMIN — METRONIDAZOLE 500 MG: 500 INJECTION, SOLUTION INTRAVENOUS at 12:00

## 2024-03-10 RX ADMIN — FERROUS SULFATE TAB 325 MG (65 MG ELEMENTAL FE) 325 MG: 325 (65 FE) TAB at 09:41

## 2024-03-10 RX ADMIN — METRONIDAZOLE 500 MG: 500 INJECTION, SOLUTION INTRAVENOUS at 03:15

## 2024-03-10 RX ADMIN — ACETAMINOPHEN 1000 MG: 500 TABLET ORAL at 05:50

## 2024-03-10 RX ADMIN — FERROUS SULFATE TAB 325 MG (65 MG ELEMENTAL FE) 325 MG: 325 (65 FE) TAB at 15:10

## 2024-03-10 RX ADMIN — LEVOTHYROXINE SODIUM 100 MCG: 0.07 TABLET ORAL at 05:49

## 2024-03-10 RX ADMIN — CEFTAZIDIME, AVIBACTAM 2.5 G: 2; .5 POWDER, FOR SOLUTION INTRAVENOUS at 00:49

## 2024-03-10 RX ADMIN — DULOXETINE HYDROCHLORIDE 60 MG: 30 CAPSULE, DELAYED RELEASE ORAL at 21:28

## 2024-03-10 RX ADMIN — Medication 1 CAPSULE: at 21:30

## 2024-03-10 RX ADMIN — VANCOMYCIN HYDROCHLORIDE 1000 MG: 1 INJECTION, POWDER, LYOPHILIZED, FOR SOLUTION INTRAVENOUS at 16:39

## 2024-03-10 RX ADMIN — CARVEDILOL 6.25 MG: 3.12 TABLET, FILM COATED ORAL at 15:10

## 2024-03-10 RX ADMIN — Medication 1 CAPSULE: at 09:41

## 2024-03-10 RX ADMIN — HYDROMORPHONE HYDROCHLORIDE 1 MG: 1 INJECTION, SOLUTION INTRAMUSCULAR; INTRAVENOUS; SUBCUTANEOUS at 09:31

## 2024-03-10 RX ADMIN — HYDROMORPHONE HYDROCHLORIDE 1 MG: 1 INJECTION, SOLUTION INTRAMUSCULAR; INTRAVENOUS; SUBCUTANEOUS at 15:08

## 2024-03-10 RX ADMIN — ACETAMINOPHEN 1000 MG: 500 TABLET ORAL at 16:58

## 2024-03-10 RX ADMIN — SODIUM CHLORIDE, PRESERVATIVE FREE 10 ML: 5 INJECTION INTRAVENOUS at 21:31

## 2024-03-10 RX ADMIN — OXCARBAZEPINE 300 MG: 300 TABLET, FILM COATED ORAL at 17:52

## 2024-03-10 RX ADMIN — KETOROLAC TROMETHAMINE 30 MG: 30 INJECTION, SOLUTION INTRAMUSCULAR; INTRAVENOUS at 17:54

## 2024-03-10 RX ADMIN — BACLOFEN 20 MG: 10 TABLET ORAL at 09:41

## 2024-03-10 RX ADMIN — OXYCODONE HYDROCHLORIDE AND ACETAMINOPHEN 1000 MG: 500 TABLET ORAL at 21:28

## 2024-03-10 RX ADMIN — CEFTAZIDIME, AVIBACTAM 2.5 G: 2; .5 POWDER, FOR SOLUTION INTRAVENOUS at 09:40

## 2024-03-10 RX ADMIN — BACLOFEN 20 MG: 10 TABLET ORAL at 21:29

## 2024-03-10 RX ADMIN — HYDROMORPHONE HYDROCHLORIDE 1.5 MG: 2 INJECTION, SOLUTION INTRAMUSCULAR; INTRAVENOUS; SUBCUTANEOUS at 00:34

## 2024-03-10 RX ADMIN — LOPERAMIDE HYDROCHLORIDE 2 MG: 2 CAPSULE ORAL at 20:04

## 2024-03-10 RX ADMIN — DULOXETINE HYDROCHLORIDE 60 MG: 30 CAPSULE, DELAYED RELEASE ORAL at 09:41

## 2024-03-10 RX ADMIN — VANCOMYCIN HYDROCHLORIDE 1000 MG: 1 INJECTION, POWDER, LYOPHILIZED, FOR SOLUTION INTRAVENOUS at 03:55

## 2024-03-10 RX ADMIN — SODIUM CHLORIDE, PRESERVATIVE FREE 10 ML: 5 INJECTION INTRAVENOUS at 09:42

## 2024-03-10 RX ADMIN — CEFTAZIDIME, AVIBACTAM 2.5 G: 2; .5 POWDER, FOR SOLUTION INTRAVENOUS at 17:52

## 2024-03-10 RX ADMIN — HYDROMORPHONE HYDROCHLORIDE 1 MG: 1 INJECTION, SOLUTION INTRAMUSCULAR; INTRAVENOUS; SUBCUTANEOUS at 21:30

## 2024-03-10 RX ADMIN — OXCARBAZEPINE 300 MG: 300 TABLET, FILM COATED ORAL at 11:58

## 2024-03-10 RX ADMIN — CARVEDILOL 6.25 MG: 3.12 TABLET, FILM COATED ORAL at 09:41

## 2024-03-10 RX ADMIN — THERA TABS 1 TABLET: TAB at 09:41

## 2024-03-10 RX ADMIN — KETOROLAC TROMETHAMINE 30 MG: 30 INJECTION, SOLUTION INTRAMUSCULAR at 00:42

## 2024-03-10 ASSESSMENT — PAIN DESCRIPTION - LOCATION
LOCATION: HIP;KNEE
LOCATION: HIP;KNEE
LOCATION: HIP
LOCATION: HIP
LOCATION: HIP;KNEE
LOCATION: HIP
LOCATION: HIP
LOCATION: HIP;KNEE

## 2024-03-10 ASSESSMENT — PAIN DESCRIPTION - DESCRIPTORS
DESCRIPTORS: ACHING
DESCRIPTORS: ACHING;DISCOMFORT
DESCRIPTORS: ACHING
DESCRIPTORS: ACHING;DISCOMFORT
DESCRIPTORS: ACHING
DESCRIPTORS: ACHING
DESCRIPTORS: ACHING;DISCOMFORT
DESCRIPTORS: ACHING;DISCOMFORT

## 2024-03-10 ASSESSMENT — PAIN DESCRIPTION - ORIENTATION
ORIENTATION: LEFT

## 2024-03-10 ASSESSMENT — PAIN SCALES - GENERAL
PAINLEVEL_OUTOF10: 7
PAINLEVEL_OUTOF10: 5
PAINLEVEL_OUTOF10: 8
PAINLEVEL_OUTOF10: 10
PAINLEVEL_OUTOF10: 8
PAINLEVEL_OUTOF10: 6
PAINLEVEL_OUTOF10: 8
PAINLEVEL_OUTOF10: 9
PAINLEVEL_OUTOF10: 10
PAINLEVEL_OUTOF10: 4

## 2024-03-10 ASSESSMENT — PAIN - FUNCTIONAL ASSESSMENT: PAIN_FUNCTIONAL_ASSESSMENT: ACTIVITIES ARE NOT PREVENTED

## 2024-03-10 ASSESSMENT — PAIN SCALES - WONG BAKER
WONGBAKER_NUMERICALRESPONSE: HURTS LITTLE MORE
WONGBAKER_NUMERICALRESPONSE: 4

## 2024-03-10 NOTE — PLAN OF CARE
Problem: Safety - Adult  Goal: Free from fall injury  3/10/2024 1022 by Juanis Medina RN  Outcome: Progressing  3/9/2024 2331 by Paulina Rogers RN  Outcome: Progressing     Problem: ABCDS Injury Assessment  Goal: Absence of physical injury  3/10/2024 1022 by Juanis Medina RN  Outcome: Progressing  3/9/2024 2331 by Paulina Rogers RN  Outcome: Progressing     Problem: Skin/Tissue Integrity  Goal: Absence of new skin breakdown  Description: 1.  Monitor for areas of redness and/or skin breakdown  2.  Assess vascular access sites hourly  3.  Every 4-6 hours minimum:  Change oxygen saturation probe site  4.  Every 4-6 hours:  If on nasal continuous positive airway pressure, respiratory therapy assess nares and determine need for appliance change or resting period.  3/10/2024 1022 by Juanis Medina RN  Outcome: Progressing  3/9/2024 2331 by Paulina Rogers RN  Outcome: Progressing

## 2024-03-10 NOTE — PROGRESS NOTES
Lower back wound with vac stopped for now and dressed with wet to dry gauze and let the wound care nurse look at the wound and vac tomorrow as per Dr. Garrison at bedside.

## 2024-03-10 NOTE — PLAN OF CARE
Problem: Discharge Planning  Goal: Discharge to home or other facility with appropriate resources  Outcome: Progressing  Flowsheets (Taken 3/9/2024 2023)  Discharge to home or other facility with appropriate resources:   Identify barriers to discharge with patient and caregiver   Arrange for needed discharge resources and transportation as appropriate   Identify discharge learning needs (meds, wound care, etc)     Problem: Skin/Tissue Integrity  Goal: Absence of new skin breakdown  Description: 1.  Monitor for areas of redness and/or skin breakdown  2.  Assess vascular access sites hourly  3.  Every 4-6 hours minimum:  Change oxygen saturation probe site  4.  Every 4-6 hours:  If on nasal continuous positive airway pressure, respiratory therapy assess nares and determine need for appliance change or resting period.  Outcome: Progressing     Problem: Safety - Adult  Goal: Free from fall injury  Outcome: Progressing     Problem: ABCDS Injury Assessment  Goal: Absence of physical injury  Outcome: Progressing     Problem: Pain  Goal: Verbalizes/displays adequate comfort level or baseline comfort level  Outcome: Progressing  Flowsheets  Taken 3/9/2024 2024  Verbalizes/displays adequate comfort level or baseline comfort level:   Encourage patient to monitor pain and request assistance   Assess pain using appropriate pain scale   Administer analgesics based on type and severity of pain and evaluate response  Taken 3/9/2024 2023  Verbalizes/displays adequate comfort level or baseline comfort level:   Encourage patient to monitor pain and request assistance   Assess pain using appropriate pain scale   Administer analgesics based on type and severity of pain and evaluate response     Problem: Nutrition Deficit:  Goal: Optimize nutritional status  Outcome: Progressing     Problem: Physical Therapy - Adult  Goal: By Discharge: Performs mobility at highest level of function for planned discharge setting.  See evaluation for

## 2024-03-10 NOTE — PROGRESS NOTES
Infectious Disease Progress Note           Subjective:   Pt seen and examined at bedside. Reports loose stools and wound vac malfunction. Doppler of LE neg for DVT. She is otherwise doing well and denies acute complaints   Objective:   Physical Exam:     /67   Pulse 84   Temp 98.1 °F (36.7 °C) (Oral)   Resp 20   Ht 1.575 m (5' 2\")   Wt 70.3 kg (155 lb)   SpO2 100%   BMI 28.35 kg/m²    O2 Device: None (Room air)    Temp (24hrs), Av.8 °F (36.6 °C), Min:97.3 °F (36.3 °C), Max:98.1 °F (36.7 °C)    No intake/output data recorded.    1901 - 03/10 0700  In: 2850 [P.O.:1300]  Out: 1430 [Urine:1200; Drains:230]    General: NAD, AAO x 4  HEENT: GIORGI, Moist mucosa   Lungs: decreased at the bases, no wheeze/rhonchi   Heart: S1S2+, RRR, no murmur  Abdo: Soft, NT, ND, +BS   : No higginbotham cath   Exts left anterior hip dressing in place   Skin: Wound Vac on lower back     Data Review:       Recent Days:    Recent Labs     24  0718 24  0748 03/10/24  0838 03/10/24  1119   WBC 6.9 6.6 6.6  --    HGB 7.4* 7.5* 6.9* 8.0*   HCT 24.9* 26.2* 23.4* 27.6*   * 389 395  --        Recent Labs     24  0718 24  0748 03/10/24  0838   BUN 8 8 14   CREATININE 0.37* 0.41* 0.44*         Lab Results   Component Value Date/Time    CRP 4.95 03/10/2024 08:38 AM     Microbiology     Results       Procedure Component Value Units Date/Time    Culture, Tissue [[id number]] Collected: 24 1528    Order Status: Completed Specimen: Tissue from Joint, Hip Updated: 03/10/24 1018     Special Requests No Special Requests        Gram stain Rare WBCs seen         No organisms seen        Culture No growth thus far       Clostridium Difficile Toxin/Antigen [[id number]]     Order Status: Sent Specimen: Stool     Culture, Tissue [[id number]] Collected: 24 1319    Order Status: Completed Specimen: Tissue Updated: 24 0656     Special Requests No Special Requests        Gram stain  (02/29)        S/p left hip surgery on 03/04, and on 03/08. Intra-op Cxs are neg         Remains afebrile w a normal WBC on routine labs, CRP 4.95 down from 14.20        Continue empiric antimicrobial coverage w Vanc and Avycaz, anticipating a minimum of 6 wks of antibiotics from 03/04.     2. Staph species bacteremia, isolated from 1/2, extensive hardware in the spine       Repeat blood Cx from 03/01 is neg, suspected skin contaminant     3. Chronic lower back infection, s/p multiple back surgeries w underlying hardware and chronic open wound       Repeated isolation of P.aeruginosa from wound Cx, most recently on 02/12      Continue on Avycaz for P.aeruginosa coverage, d/c Metronidazole       Wound Vac malfunction, advised RN to d/c and place wet to dry dressing on wound bed     4  Diarrhea, likely antibiotics associated, stool for C.diff ordered but not done       Start on imodium, already on probiotics     Promise Garrison MD    3/10/2024

## 2024-03-10 NOTE — PROGRESS NOTES
Reached out by nursing regarding sudden worsening of left thigh pain. On my evaluation, patient reports she was sitting on the chair, no trauma, when her left thigh pain suddenly worsened. Distal pulse palpable, moving left foot. Lovenox for VTE prophylaxis was held, there is concern for DVT. Duplex ultrasound ordered. She also underwent excisional debridement of left hip wound on 3/8 and other orthopedic procedure on 3/4. Uncertain of the type of possible complication expected, or if CT hip/thigh is indicated. Unable to reach Dr. Ramirez and orthopedics oncall Dr. Rodney reports not covering [facility] group.     Pain improved with IV dilaudid and ketorolac.

## 2024-03-10 NOTE — PROGRESS NOTES
Vancomycin Dosing Consult  Paula Renteria is a 53 y.o. female with GPC bacteremia. Pharmacy was consulted by Dr. Garrison to dose and monitor vancomycin. Today is day 8.    Antibiotic regimen: Vancomycin + Avycaz    Temp (24hrs), Av.9 °F (36.6 °C), Min:97.3 °F (36.3 °C), Max:98.4 °F (36.9 °C)    Recent Labs     24  0718 24  0748 03/10/24  0838   WBC 6.9 6.6 6.6   CREATININE 0.37* 0.41* 0.44*   BUN 8 8 14     Est CrCl: > 100 mL/min  Concomitant nephrotoxic drugs: Contrast agents    Cultures:   24: Blood cultures x 2: CoNS in 1 of 4 (< 24 hrs)  3/1 Blood: NGTD, final  3/4 Tissue: NG,  prelimi  3/4 Wound: NGTD, prelim   3/8 Tissue: NGTD, prelim     MRSA Swab: Not ordered, patient already received first dose of vancomycin    Target range: AUC/ISABELLA 400-600    Recent level history:  Date/Time Dose & Interval Measured Level (mcg/mL) Associated AUC/ISABELLA   3/1 @ 1413 1750mg x 1, then 1500mg q12h 18.4 727   3/4 @ 0632 1000 mg q12h 9.5 436   3/7 @ 1434 1000 mg q8h 11.4 462   3/10 @ 0838 1000 mg q8h 26.8 702        Assessment/Plan:   Documented allergy to vancomycin with hives. Per patient, she has tolerated vancomycin if pre-medicated with Benadryl.   Renal function normal and stable  Level is supratherapeutic, decrease to Vancomycin 1000 mg q12h with predicted AUC of 510  Next level scheduled for 3/12 @ 1400  Antimicrobial stop date TBD

## 2024-03-10 NOTE — PROGRESS NOTES
Hospitalist Progress Note    NAME:   Paula Renteria   : 1970   MRN: [id number]     Date/Time: 3/10/2024 10:56 AM  Patient PCP: Lion Liu MD    Estimated discharge date: 24-48 hours  Barriers: Orthopedic surgery clearance       HOSPITAL COURSE:    Paula Renteria is a 53 y.o.  female with PMHx significant for several lumbar fusion surgeries, most recently a fusion in  that required several washout surgeries and has had an open wound since.  Patient admitted 24  because her orthopedic doctor, Dr. Ramirez, recommended her to come to the ER for septic arthritis of left hip. CT shows septic arthritis of the left hip with collapse of the left femoral head.  Diffuse osteosclerosis of the left hemipelvis can be seen with chronic osteomyelitis.  Culture of tissue from 2024 shows moderate Pseudomonas MDRO and moderate bacteroides thetaiotaomicron beta lactamase positive.   ID consulted.  ID started patient on Avycaz.  PICC line placed.  Ortho consulted. upta perioperative risk shows 0.5% risk of MI or cardiac arrest. [employer] Risk calculator shows an average risk of serious complications including surgical site infections, UTI, pneumonia.  Below average chance of VTE, renal failure, death.  Patient medically cleared for procedure. Taken to OR 3/4/2024 for debridement of left hip with girdlestone resection. Taken to OR  for left hip debridement.     Per Dr. Ramirez, patient will discharge with wound VAC.   Activity at discharge: bedrest with BSC privileges and 15 lb weight-bearing left lower extremity  Wound care:  Prevena dressing for left hip  Recommend DVT prophylaxis with Lovenox 30 mg subQ daily    Assessment / Plan:    Septic arthritis/chronic osteomyelitis involving the left hip  - S/p left hip debridement on . Also had Intra-Op antibiotic cement not articulating spacer placed in the left hip.  - Intra-op Cxs no growth to date, no organisms on Gram stain   -

## 2024-03-11 LAB
ANION GAP SERPL CALC-SCNC: 3 MMOL/L (ref 5–15)
BUN SERPL-MCNC: 12 MG/DL (ref 6–20)
BUN/CREAT SERPL: 29 (ref 12–20)
CA-I BLD-MCNC: 8.2 MG/DL (ref 8.5–10.1)
CHLORIDE SERPL-SCNC: 112 MMOL/L (ref 97–108)
CO2 SERPL-SCNC: 25 MMOL/L (ref 21–32)
CREAT SERPL-MCNC: 0.42 MG/DL (ref 0.55–1.02)
CRP SERPL-MCNC: 3.07 MG/DL (ref 0–0.3)
ECHO BSA: 1.75 M2
ERYTHROCYTE [DISTWIDTH] IN BLOOD BY AUTOMATED COUNT: 20.8 % (ref 11.5–14.5)
GLUCOSE SERPL-MCNC: 83 MG/DL (ref 65–100)
HCT VFR BLD AUTO: 26.8 % (ref 35–47)
HGB BLD-MCNC: 7.7 G/DL (ref 11.5–16)
MCH RBC QN AUTO: 23.5 PG (ref 26–34)
MCHC RBC AUTO-ENTMCNC: 28.7 G/DL (ref 30–36.5)
MCV RBC AUTO: 81.7 FL (ref 80–99)
NRBC # BLD: 0.02 K/UL (ref 0–0.01)
NRBC BLD-RTO: 0.3 PER 100 WBC
PLATELET # BLD AUTO: 431 K/UL (ref 150–400)
PMV BLD AUTO: 9.6 FL (ref 8.9–12.9)
POTASSIUM SERPL-SCNC: 3.8 MMOL/L (ref 3.5–5.1)
RBC # BLD AUTO: 3.28 M/UL (ref 3.8–5.2)
SODIUM SERPL-SCNC: 140 MMOL/L (ref 136–145)
WBC # BLD AUTO: 6.6 K/UL (ref 3.6–11)

## 2024-03-11 PROCEDURE — 80048 BASIC METABOLIC PNL TOTAL CA: CPT

## 2024-03-11 PROCEDURE — 93970 EXTREMITY STUDY: CPT | Performed by: SURGERY

## 2024-03-11 PROCEDURE — 99024 POSTOP FOLLOW-UP VISIT: CPT

## 2024-03-11 PROCEDURE — 6360000002 HC RX W HCPCS: Performed by: INTERNAL MEDICINE

## 2024-03-11 PROCEDURE — 85027 COMPLETE CBC AUTOMATED: CPT

## 2024-03-11 PROCEDURE — 1100000000 HC RM PRIVATE

## 2024-03-11 PROCEDURE — 6370000000 HC RX 637 (ALT 250 FOR IP): Performed by: ORTHOPAEDIC SURGERY

## 2024-03-11 PROCEDURE — 2580000003 HC RX 258: Performed by: ORTHOPAEDIC SURGERY

## 2024-03-11 PROCEDURE — 2500000003 HC RX 250 WO HCPCS: Performed by: HOSPITALIST

## 2024-03-11 PROCEDURE — 6360000002 HC RX W HCPCS: Performed by: ORTHOPAEDIC SURGERY

## 2024-03-11 PROCEDURE — 99232 SBSQ HOSP IP/OBS MODERATE 35: CPT | Performed by: INTERNAL MEDICINE

## 2024-03-11 PROCEDURE — 6370000000 HC RX 637 (ALT 250 FOR IP): Performed by: INTERNAL MEDICINE

## 2024-03-11 PROCEDURE — 97530 THERAPEUTIC ACTIVITIES: CPT

## 2024-03-11 PROCEDURE — 36415 COLL VENOUS BLD VENIPUNCTURE: CPT

## 2024-03-11 PROCEDURE — 2580000003 HC RX 258: Performed by: INTERNAL MEDICINE

## 2024-03-11 PROCEDURE — 86140 C-REACTIVE PROTEIN: CPT

## 2024-03-11 RX ORDER — FAMOTIDINE 20 MG/1
20 TABLET, FILM COATED ORAL 2 TIMES DAILY
Qty: 60 TABLET | Refills: 3 | Status: SHIPPED | OUTPATIENT
Start: 2024-03-11

## 2024-03-11 RX ORDER — CARVEDILOL 6.25 MG/1
6.25 TABLET ORAL 2 TIMES DAILY WITH MEALS
Qty: 60 TABLET | Refills: 3 | Status: SHIPPED | OUTPATIENT
Start: 2024-03-11

## 2024-03-11 RX ORDER — FERROUS SULFATE 325(65) MG
325 TABLET ORAL 2 TIMES DAILY WITH MEALS
Qty: 30 TABLET | Refills: 3 | Status: SHIPPED | OUTPATIENT
Start: 2024-03-11

## 2024-03-11 RX ADMIN — CARVEDILOL 6.25 MG: 3.12 TABLET, FILM COATED ORAL at 09:21

## 2024-03-11 RX ADMIN — ACETAMINOPHEN 1000 MG: 500 TABLET ORAL at 12:31

## 2024-03-11 RX ADMIN — LOPERAMIDE HYDROCHLORIDE 2 MG: 2 CAPSULE ORAL at 17:06

## 2024-03-11 RX ADMIN — LOPERAMIDE HYDROCHLORIDE 2 MG: 2 CAPSULE ORAL at 22:00

## 2024-03-11 RX ADMIN — VANCOMYCIN HYDROCHLORIDE 1000 MG: 1 INJECTION, POWDER, LYOPHILIZED, FOR SOLUTION INTRAVENOUS at 06:25

## 2024-03-11 RX ADMIN — CEFTAZIDIME, AVIBACTAM 2.5 G: 2; .5 POWDER, FOR SOLUTION INTRAVENOUS at 09:21

## 2024-03-11 RX ADMIN — SODIUM CHLORIDE, PRESERVATIVE FREE 10 ML: 5 INJECTION INTRAVENOUS at 22:01

## 2024-03-11 RX ADMIN — OXCARBAZEPINE 300 MG: 300 TABLET, FILM COATED ORAL at 09:25

## 2024-03-11 RX ADMIN — LEVOTHYROXINE SODIUM 100 MCG: 0.07 TABLET ORAL at 06:36

## 2024-03-11 RX ADMIN — VANCOMYCIN HYDROCHLORIDE 1000 MG: 1 INJECTION, POWDER, LYOPHILIZED, FOR SOLUTION INTRAVENOUS at 15:43

## 2024-03-11 RX ADMIN — HYDROMORPHONE HYDROCHLORIDE 1 MG: 1 INJECTION, SOLUTION INTRAMUSCULAR; INTRAVENOUS; SUBCUTANEOUS at 16:53

## 2024-03-11 RX ADMIN — HYDROMORPHONE HYDROCHLORIDE 1 MG: 1 INJECTION, SOLUTION INTRAMUSCULAR; INTRAVENOUS; SUBCUTANEOUS at 06:34

## 2024-03-11 RX ADMIN — OXCARBAZEPINE 300 MG: 300 TABLET, FILM COATED ORAL at 09:20

## 2024-03-11 RX ADMIN — KETOROLAC TROMETHAMINE 30 MG: 30 INJECTION, SOLUTION INTRAMUSCULAR; INTRAVENOUS at 01:53

## 2024-03-11 RX ADMIN — OXYCODONE HYDROCHLORIDE AND ACETAMINOPHEN 1000 MG: 500 TABLET ORAL at 09:20

## 2024-03-11 RX ADMIN — FERROUS SULFATE TAB 325 MG (65 MG ELEMENTAL FE) 325 MG: 325 (65 FE) TAB at 09:21

## 2024-03-11 RX ADMIN — OXYCODONE HYDROCHLORIDE AND ACETAMINOPHEN 1000 MG: 500 TABLET ORAL at 21:51

## 2024-03-11 RX ADMIN — Medication 1 CAPSULE: at 21:53

## 2024-03-11 RX ADMIN — KETOROLAC TROMETHAMINE 30 MG: 30 INJECTION, SOLUTION INTRAMUSCULAR; INTRAVENOUS at 09:21

## 2024-03-11 RX ADMIN — ACETAMINOPHEN 1000 MG: 500 TABLET ORAL at 01:54

## 2024-03-11 RX ADMIN — Medication 1 CAPSULE: at 09:20

## 2024-03-11 RX ADMIN — CEFTAZIDIME, AVIBACTAM 2.5 G: 2; .5 POWDER, FOR SOLUTION INTRAVENOUS at 03:23

## 2024-03-11 RX ADMIN — HYDROMORPHONE HYDROCHLORIDE 1 MG: 1 INJECTION, SOLUTION INTRAMUSCULAR; INTRAVENOUS; SUBCUTANEOUS at 21:59

## 2024-03-11 RX ADMIN — SODIUM CHLORIDE, PRESERVATIVE FREE 10 ML: 5 INJECTION INTRAVENOUS at 09:21

## 2024-03-11 RX ADMIN — LOPERAMIDE HYDROCHLORIDE 2 MG: 2 CAPSULE ORAL at 09:35

## 2024-03-11 RX ADMIN — HYDROMORPHONE HYDROCHLORIDE 1 MG: 1 INJECTION, SOLUTION INTRAMUSCULAR; INTRAVENOUS; SUBCUTANEOUS at 12:31

## 2024-03-11 RX ADMIN — BACLOFEN 20 MG: 10 TABLET ORAL at 21:52

## 2024-03-11 RX ADMIN — BACLOFEN 20 MG: 10 TABLET ORAL at 09:20

## 2024-03-11 RX ADMIN — FERROUS SULFATE TAB 325 MG (65 MG ELEMENTAL FE) 325 MG: 325 (65 FE) TAB at 15:44

## 2024-03-11 RX ADMIN — CEFTAZIDIME, AVIBACTAM 2.5 G: 2; .5 POWDER, FOR SOLUTION INTRAVENOUS at 16:54

## 2024-03-11 RX ADMIN — OXCARBAZEPINE 300 MG: 300 TABLET, FILM COATED ORAL at 16:53

## 2024-03-11 RX ADMIN — DULOXETINE HYDROCHLORIDE 60 MG: 30 CAPSULE, DELAYED RELEASE ORAL at 21:54

## 2024-03-11 RX ADMIN — THERA TABS 1 TABLET: TAB at 09:25

## 2024-03-11 RX ADMIN — CARVEDILOL 6.25 MG: 3.12 TABLET, FILM COATED ORAL at 15:44

## 2024-03-11 RX ADMIN — ACETAMINOPHEN 1000 MG: 500 TABLET ORAL at 06:24

## 2024-03-11 RX ADMIN — OXCARBAZEPINE 300 MG: 300 TABLET, FILM COATED ORAL at 12:30

## 2024-03-11 RX ADMIN — DULOXETINE HYDROCHLORIDE 60 MG: 30 CAPSULE, DELAYED RELEASE ORAL at 09:22

## 2024-03-11 ASSESSMENT — PAIN SCALES - GENERAL
PAINLEVEL_OUTOF10: 7
PAINLEVEL_OUTOF10: 6
PAINLEVEL_OUTOF10: 4
PAINLEVEL_OUTOF10: 8
PAINLEVEL_OUTOF10: 4
PAINLEVEL_OUTOF10: 5
PAINLEVEL_OUTOF10: 7
PAINLEVEL_OUTOF10: 7
PAINLEVEL_OUTOF10: 9
PAINLEVEL_OUTOF10: 7
PAINLEVEL_OUTOF10: 8
PAINLEVEL_OUTOF10: 7
PAINLEVEL_OUTOF10: 4

## 2024-03-11 ASSESSMENT — PAIN DESCRIPTION - DESCRIPTORS
DESCRIPTORS: ACHING

## 2024-03-11 ASSESSMENT — ENCOUNTER SYMPTOMS
BACK PAIN: 1
CONSTIPATION: 0
SHORTNESS OF BREATH: 0
ABDOMINAL PAIN: 0
COUGH: 0
NAUSEA: 0
DIARRHEA: 0

## 2024-03-11 ASSESSMENT — PAIN DESCRIPTION - ORIENTATION
ORIENTATION: LEFT

## 2024-03-11 ASSESSMENT — PAIN DESCRIPTION - LOCATION
LOCATION: LEG;KNEE
LOCATION: HIP
LOCATION: LEG
LOCATION: LEG
LOCATION: HIP
LOCATION: HIP
LOCATION: LEG
LOCATION: LEG

## 2024-03-11 ASSESSMENT — PAIN SCALES - WONG BAKER
WONGBAKER_NUMERICALRESPONSE: 4
WONGBAKER_NUMERICALRESPONSE: HURTS LITTLE MORE

## 2024-03-11 ASSESSMENT — PAIN - FUNCTIONAL ASSESSMENT
PAIN_FUNCTIONAL_ASSESSMENT: ACTIVITIES ARE NOT PREVENTED
PAIN_FUNCTIONAL_ASSESSMENT: ACTIVITIES ARE NOT PREVENTED
PAIN_FUNCTIONAL_ASSESSMENT: PREVENTS OR INTERFERES SOME ACTIVE ACTIVITIES AND ADLS

## 2024-03-11 NOTE — PROGRESS NOTES
Orthopedic Progress Note    Date:3/11/2024       Room:[facility]  Patient Name:Paula Renteria     YOB: 1970     Age:53 y.o.  female     SUBJECTIVE    3/11/2024: POD # 3 from repeat I&D of left hip.  She is sitting comfortably in chair during times around.  She states she has pain to the hip this morning.  She states \" she is afraid that she may not be able to walk again\".  She has no additional complaints and no concerns today.    VITALS         Temp  Av.2 °F (36.8 °C)  Min: 97.9 °F (36.6 °C)  Max: 98.5 °F (36.9 °C)  Pulse  Av.3  Min: 78  Max: 84  Systolic (24hrs), Av , Min:126 , Max:155    Diastolic (24hrs), Av, Min:67, Max:86    Resp  Av.8  Min: 16  Max: 20  SpO2  Av.5 %  Min: 98 %  Max: 100 %  LABS      Recent Labs     24  0748 03/10/24  0838 03/10/24  1119   WBC 6.6 6.6  --    RBC 3.24* 2.92*  --    HGB 7.5* 6.9* 8.0*   HCT 26.2* 23.4* 27.6*   MCV 80.9 80.1  --    RDW 19.7* 20.5*  --     395  --      Recent Labs     24  0748 03/10/24  0838    142   K 3.9 3.5   * 113*   CO2 27 25   BUN 8 14   GLUCOSE 87 96   PT/INR:No results for input(s): \"PROTIME\", \"INR\" in the last 72 hours.  ESR: --   Lab Results   Component Value Date/Time    CRP 4.95 03/10/2024 08:38 AM      Results       Procedure Component Value Units Date/Time    Culture, Tissue [[id number]] Collected: 24 1528    Order Status: Completed Specimen: Tissue from Joint, Hip Updated: 03/10/24 1018     Special Requests No Special Requests        Gram stain Rare WBCs seen         No organisms seen        Culture No growth thus far       Clostridium Difficile Toxin/Antigen [[id number]] Collected: 24 0030    Order Status: Canceled Specimen: Stool     Culture, Tissue [[id number]] Collected: 24 1319    Order Status: Completed Specimen: Tissue Updated: 24 0656     Special Requests No Special Requests        Gram stain Occasional WBCs seen         No organisms seen

## 2024-03-11 NOTE — PROGRESS NOTES
Hospitalist Progress Note    NAME:   Paula Renteria   : 1970   MRN: [id number]     Date/Time: 3/11/2024 1:06 PM  Patient PCP: Lion Liu MD    Estimated discharge date: 24-48 hours  Barriers: Orthopedic surgery clearance       HOSPITAL COURSE:    Paula Renteria is a 53 y.o.  female with PMHx significant for several lumbar fusion surgeries, most recently a fusion in  that required several washout surgeries and has had an open wound since.  Patient admitted 24  because her orthopedic doctor, Dr. Ramirez, recommended her to come to the ER for septic arthritis of left hip. CT shows septic arthritis of the left hip with collapse of the left femoral head.  Diffuse osteosclerosis of the left hemipelvis can be seen with chronic osteomyelitis.  Culture of tissue from 2024 shows moderate Pseudomonas MDRO and moderate bacteroides thetaiotaomicron beta lactamase positive.   ID consulted.  ID started patient on Avycaz.  PICC line placed.  Ortho consulted. upta perioperative risk shows 0.5% risk of MI or cardiac arrest. [employer] Risk calculator shows an average risk of serious complications including surgical site infections, UTI, pneumonia.  Below average chance of VTE, renal failure, death.  Patient medically cleared for procedure. Taken to OR 3/4/2024 for debridement of left hip with girdlestone resection. Taken to OR  for left hip debridement. Post-op duplex US left lower ext negative for DVT, but showed left groin fluid collection 6.26 x 4.33 cm consistent with hematoma. Conservative management per orthopedic surgery.    Per Dr. Ramirez, patient will discharge with wet-to-dry dressing and wound VAC will be applied at SNF. Discharge with left wound hemovac.    Activity at discharge: bedrest with BSC privileges and 15 lb weight-bearing left lower extremity  Wound care:  Prevena dressing for left hip  Recommend DVT prophylaxis with Lovenox 30 mg subQ daily    Assessment /  old records today    NO  Reviewed patient's current orders and MAR    YES  PMH/SH reviewed - no change compared to H&P  ________________________________________________________________________  Care Plan discussed with:    Comments   Patient x    Family      RN x    Care Manager     Consultant                        Multidiciplinary team rounds were held today with , nursing, pharmacist and clinical coordinator.  Patient's plan of care was discussed; medications were reviewed and discharge planning was addressed.     ________________________________________________________________________  Total NON critical care TIME:  35  Minutes    Total CRITICAL CARE TIME Spent:   Minutes non procedure based      Comments   >50% of visit spent in counseling and coordination of care     ________________________________________________________________________  Bolivar Ruiz PA-C     Procedures: see electronic medical records for all procedures/Xrays and details which were not copied into this note but were reviewed prior to creation of Plan.      LABS:  I reviewed today's most current labs and imaging studies.  Pertinent labs include:  Recent Labs     03/09/24  0748 03/10/24  0838 03/10/24  1119 03/11/24  1056   WBC 6.6 6.6  --  6.6   HGB 7.5* 6.9* 8.0* 7.7*   HCT 26.2* 23.4* 27.6* 26.8*    395  --  431*       Recent Labs     03/09/24  0748 03/10/24  0838 03/11/24  1056    142 140   K 3.9 3.5 3.8   * 113* 112*   CO2 27 25 25   BUN 8 14 12         Signed: Bolivar Ruiz PA-C

## 2024-03-11 NOTE — PROGRESS NOTES
OCCUPATIONAL THERAPY TREATMENT  Patient: Paula Renteria (53 y.o. female)  Date: 3/11/2024  Primary Diagnosis: Pyogenic arthritis of left hip, due to unspecified organism (HCC) [M00.9]  Pain of left hip [M25.552]  Procedure(s) (LRB):  INCISION AND DRAINAGE AND DEBRIDEMENT HIP (Left) 3 Days Post-Op   Precautions: Fall Risk, Contact Precautions, Weight Bearing, Bed Alarm   Left Lower Extremity Weight Bearing: Toe Touch Weight Bearing            Recommendations for nursing mobility: Encourage HEP in prep for ADLs/mobility; see handout for details, Frequent repositioning to prevent skin breakdown, Use of bed/chair alarm for safety, Use of BSC for toileting , AD and gt belt for bed to chair , and Assist x1    In place during session: PICC line, Supra-pubic catheter, and closed suction drain  Chart, occupational therapy assessment, plan of care, and goals were reviewed.  ASSESSMENT  Patient continues with skilled OT services and is progressing towards goals. Pt up in recliner upon WATKINS arrival, agreeable to session. Pt A&O x 4. Pt was set up for face and hand washing. Pt completed 1 set 10 reps HEP. Hand out provided. Pt demonstrated understanding with verbal cues for proper technique. Activity completed to increase strength and endurance necessary for basic ADLs and functional transfers. Pt educated on energy conservation. Therapist answered pts questions on progression of therapy and what she can expect in the future.  Pt verbalized understanding. Pt left in recliner. (See below for objective details and assist levels).     Overall pt tolerated session fair today. Pt tearful today at start of session.  Potential barriers for safe discharge: pts current support system is unable to meet their requirements for physical assistance, pt is a high fall risk, concern for pt safely navigating or managing the home environment, and pt has new weight bearing restrictions limiting activity or patient is unable to maintain. Current OT  Level: Oriented X4  Cognition  Overall Cognitive Status: WNL    Grooming: Setup in recliner    Therapeutic exercise:  Completed in recliner. Demonstration and verbal cues required for proper technique.  Exercise Sets Reps AROM AAROM PROM Self PROM Comments   Shoulder flex/ext 1 10 [x] [] [] []    Elbow flex/ext 1 10 [x] [] [] []    Wrist flex/ext 1 10 [x] [] [] []    Hand flex/ext 1 10 [x] [] [] []      Pain Ratin/10   Pain Intervention(s):   patient medicated for pain prior to session, ice, and repositioning    Activity Tolerance:   Fair     After treatment patient left in no apparent distress:   Bed locked and returned to lowest position, Patient left in no apparent distress sitting up in chair and Call bell within reach, and nsg updated     COMMUNICATION/EDUCATION:   The patient’s plan of care was discussed with: Registered nurse    Patient Education  Education Given To: Patient  Education Provided: Home Exercise Program;Energy Conservation;Plan of Care  Education Method: Verbal  Barriers to Learning: None  Education Outcome: Verbalized understanding    Thank you for this referral.  KAVIN Escobar  Minutes: 35

## 2024-03-11 NOTE — PROGRESS NOTES
Infectious Disease Progress Note           Subjective:   Remains stable, denies new complaints, no acute events since last seen, interested in d/c to SNF, remains stable. Left hip pain is better. Decreased BM frequency   Objective:   Physical Exam:     BP (!) 151/86   Pulse 79   Temp 97.9 °F (36.6 °C) (Oral)   Resp 16   Ht 1.575 m (5' 2\")   Wt 70.3 kg (155 lb)   SpO2 100%   BMI 28.35 kg/m²    O2 Device: None (Room air)    Temp (24hrs), Av.2 °F (36.8 °C), Min:97.9 °F (36.6 °C), Max:98.5 °F (36.9 °C)    No intake/output data recorded.    1901 -  0700  In: 1950 [P.O.:400]  Out: 490 [Urine:300; Drains:190]    General: NAD, AAO x 4  HEENT: GIORGI, Moist mucosa   Lungs: decreased at the bases, no wheeze/rhonchi   Heart: S1S2+, RRR, no murmur  Abdo: Soft, NT, ND, +BS   : No higginbotham cath   Exts left anterior hip dressing in place   Skin: Wound Vac on lower back     Data Review:       Recent Days:    Recent Labs     24  0748 03/10/24  0838 03/10/24  1119 24  1056   WBC 6.6 6.6  --  6.6   HGB 7.5* 6.9* 8.0* 7.7*   HCT 26.2* 23.4* 27.6* 26.8*    395  --  431*       Recent Labs     24  0748 03/10/24  0838 24  1056   BUN 8 14 12   CREATININE 0.41* 0.44* 0.42*         Lab Results   Component Value Date/Time    CRP 4.95 03/10/2024 08:38 AM     Microbiology     Results       Procedure Component Value Units Date/Time    Culture, Tissue [[id number]] Collected: 24 1528    Order Status: Completed Specimen: Tissue from Joint, Hip Updated: 03/10/24 1018     Special Requests No Special Requests        Gram stain Rare WBCs seen         No organisms seen        Culture No growth thus far       Clostridium Difficile Toxin/Antigen [[id number]] Collected: 24 0030    Order Status: Canceled Specimen: Stool     Culture, Tissue [[id number]] Collected: 24 1319    Order Status: Completed Specimen: Tissue Updated: 24 0656     Special Requests No Special

## 2024-03-11 NOTE — CARE COORDINATION
CM reviewed chart. Discharge plans are for SNF for rehab and IV abx/wound care. [facility] following patient, needing updated PT/OT rec's and final abx plans documented for insurance Auth.     1320: updated notes have been sent to [facility]vin, request for auth to be started.     1350: [facility] has declined and [facility] declined due to being to complex or due to  infection. Discussed with patient. Patient would like to try and stay close by. Discussed local areas. Gave choice for [facility] and [facility]. Referral sent via [facility].     1500: accepted at [facility]. Uploaded additional documents. Requesting auth to be started.     1535: just informed [facility] is not in network

## 2024-03-12 LAB
ANION GAP SERPL CALC-SCNC: 5 MMOL/L (ref 5–15)
BUN SERPL-MCNC: 12 MG/DL (ref 6–20)
BUN/CREAT SERPL: 32 (ref 12–20)
CA-I BLD-MCNC: 8.2 MG/DL (ref 8.5–10.1)
CHLORIDE SERPL-SCNC: 114 MMOL/L (ref 97–108)
CO2 SERPL-SCNC: 23 MMOL/L (ref 21–32)
CREAT SERPL-MCNC: 0.37 MG/DL (ref 0.55–1.02)
ERYTHROCYTE [DISTWIDTH] IN BLOOD BY AUTOMATED COUNT: 21.7 % (ref 11.5–14.5)
GLUCOSE SERPL-MCNC: 86 MG/DL (ref 65–100)
HCT VFR BLD AUTO: 30.8 % (ref 35–47)
HGB BLD-MCNC: 8.4 G/DL (ref 11.5–16)
MCH RBC QN AUTO: 23.8 PG (ref 26–34)
MCHC RBC AUTO-ENTMCNC: 27.3 G/DL (ref 30–36.5)
MCV RBC AUTO: 87.3 FL (ref 80–99)
NRBC # BLD: 0.02 K/UL (ref 0–0.01)
NRBC BLD-RTO: 0.3 PER 100 WBC
PLATELET # BLD AUTO: 368 K/UL (ref 150–400)
PMV BLD AUTO: 9.3 FL (ref 8.9–12.9)
POTASSIUM SERPL-SCNC: 3.9 MMOL/L (ref 3.5–5.1)
RBC # BLD AUTO: 3.53 M/UL (ref 3.8–5.2)
SODIUM SERPL-SCNC: 142 MMOL/L (ref 136–145)
VANCOMYCIN SERPL-MCNC: 8.9 UG/ML
WBC # BLD AUTO: 5.9 K/UL (ref 3.6–11)

## 2024-03-12 PROCEDURE — 1100000000 HC RM PRIVATE

## 2024-03-12 PROCEDURE — 80048 BASIC METABOLIC PNL TOTAL CA: CPT

## 2024-03-12 PROCEDURE — 6360000002 HC RX W HCPCS: Performed by: INTERNAL MEDICINE

## 2024-03-12 PROCEDURE — 99232 SBSQ HOSP IP/OBS MODERATE 35: CPT | Performed by: INTERNAL MEDICINE

## 2024-03-12 PROCEDURE — 2580000003 HC RX 258: Performed by: ORTHOPAEDIC SURGERY

## 2024-03-12 PROCEDURE — 80202 ASSAY OF VANCOMYCIN: CPT

## 2024-03-12 PROCEDURE — 2500000003 HC RX 250 WO HCPCS: Performed by: HOSPITALIST

## 2024-03-12 PROCEDURE — 85027 COMPLETE CBC AUTOMATED: CPT

## 2024-03-12 PROCEDURE — 97605 NEG PRS WND THER DME<=50SQCM: CPT

## 2024-03-12 PROCEDURE — 6370000000 HC RX 637 (ALT 250 FOR IP): Performed by: INTERNAL MEDICINE

## 2024-03-12 PROCEDURE — 36415 COLL VENOUS BLD VENIPUNCTURE: CPT

## 2024-03-12 PROCEDURE — 6360000002 HC RX W HCPCS: Performed by: ORTHOPAEDIC SURGERY

## 2024-03-12 PROCEDURE — 97530 THERAPEUTIC ACTIVITIES: CPT

## 2024-03-12 PROCEDURE — 6370000000 HC RX 637 (ALT 250 FOR IP): Performed by: ORTHOPAEDIC SURGERY

## 2024-03-12 PROCEDURE — 2580000003 HC RX 258: Performed by: INTERNAL MEDICINE

## 2024-03-12 RX ADMIN — VANCOMYCIN HYDROCHLORIDE 1000 MG: 1 INJECTION, POWDER, LYOPHILIZED, FOR SOLUTION INTRAVENOUS at 04:37

## 2024-03-12 RX ADMIN — DULOXETINE HYDROCHLORIDE 60 MG: 30 CAPSULE, DELAYED RELEASE ORAL at 09:24

## 2024-03-12 RX ADMIN — OXCARBAZEPINE 300 MG: 300 TABLET, FILM COATED ORAL at 11:16

## 2024-03-12 RX ADMIN — OXYCODONE HYDROCHLORIDE AND ACETAMINOPHEN 1000 MG: 500 TABLET ORAL at 21:30

## 2024-03-12 RX ADMIN — Medication 1 CAPSULE: at 21:31

## 2024-03-12 RX ADMIN — OXCARBAZEPINE 300 MG: 300 TABLET, FILM COATED ORAL at 08:05

## 2024-03-12 RX ADMIN — OXCARBAZEPINE 300 MG: 300 TABLET, FILM COATED ORAL at 16:16

## 2024-03-12 RX ADMIN — HYDROMORPHONE HYDROCHLORIDE 1 MG: 1 INJECTION, SOLUTION INTRAMUSCULAR; INTRAVENOUS; SUBCUTANEOUS at 12:30

## 2024-03-12 RX ADMIN — OXYCODONE HYDROCHLORIDE AND ACETAMINOPHEN 1000 MG: 500 TABLET ORAL at 09:24

## 2024-03-12 RX ADMIN — ACETAMINOPHEN 1000 MG: 500 TABLET ORAL at 11:16

## 2024-03-12 RX ADMIN — CEFTAZIDIME, AVIBACTAM 2.5 G: 2; .5 POWDER, FOR SOLUTION INTRAVENOUS at 21:37

## 2024-03-12 RX ADMIN — CARVEDILOL 6.25 MG: 3.12 TABLET, FILM COATED ORAL at 16:16

## 2024-03-12 RX ADMIN — HYDROMORPHONE HYDROCHLORIDE 1 MG: 1 INJECTION, SOLUTION INTRAMUSCULAR; INTRAVENOUS; SUBCUTANEOUS at 18:34

## 2024-03-12 RX ADMIN — CARVEDILOL 6.25 MG: 3.12 TABLET, FILM COATED ORAL at 09:24

## 2024-03-12 RX ADMIN — BACLOFEN 20 MG: 10 TABLET ORAL at 09:24

## 2024-03-12 RX ADMIN — VANCOMYCIN HYDROCHLORIDE 1000 MG: 1 INJECTION, POWDER, LYOPHILIZED, FOR SOLUTION INTRAVENOUS at 16:14

## 2024-03-12 RX ADMIN — HYDROMORPHONE HYDROCHLORIDE 1 MG: 1 INJECTION, SOLUTION INTRAMUSCULAR; INTRAVENOUS; SUBCUTANEOUS at 22:55

## 2024-03-12 RX ADMIN — DULOXETINE HYDROCHLORIDE 60 MG: 30 CAPSULE, DELAYED RELEASE ORAL at 21:31

## 2024-03-12 RX ADMIN — THERA TABS 1 TABLET: TAB at 09:25

## 2024-03-12 RX ADMIN — OXCARBAZEPINE 300 MG: 300 TABLET, FILM COATED ORAL at 09:24

## 2024-03-12 RX ADMIN — LOPERAMIDE HYDROCHLORIDE 2 MG: 2 CAPSULE ORAL at 23:10

## 2024-03-12 RX ADMIN — LEVOTHYROXINE SODIUM 100 MCG: 0.07 TABLET ORAL at 08:03

## 2024-03-12 RX ADMIN — KETOROLAC TROMETHAMINE 30 MG: 30 INJECTION, SOLUTION INTRAMUSCULAR; INTRAVENOUS at 04:36

## 2024-03-12 RX ADMIN — CEFTAZIDIME, AVIBACTAM 2.5 G: 2; .5 POWDER, FOR SOLUTION INTRAVENOUS at 12:23

## 2024-03-12 RX ADMIN — FERROUS SULFATE TAB 325 MG (65 MG ELEMENTAL FE) 325 MG: 325 (65 FE) TAB at 16:16

## 2024-03-12 RX ADMIN — SODIUM CHLORIDE, PRESERVATIVE FREE 10 ML: 5 INJECTION INTRAVENOUS at 09:25

## 2024-03-12 RX ADMIN — CEFTAZIDIME, AVIBACTAM 2.5 G: 2; .5 POWDER, FOR SOLUTION INTRAVENOUS at 04:26

## 2024-03-12 RX ADMIN — ACETAMINOPHEN 1000 MG: 500 TABLET ORAL at 18:30

## 2024-03-12 RX ADMIN — FERROUS SULFATE TAB 325 MG (65 MG ELEMENTAL FE) 325 MG: 325 (65 FE) TAB at 09:24

## 2024-03-12 RX ADMIN — ACETAMINOPHEN 1000 MG: 500 TABLET ORAL at 03:00

## 2024-03-12 RX ADMIN — Medication 1 CAPSULE: at 09:24

## 2024-03-12 RX ADMIN — ACETAMINOPHEN 1000 MG: 500 TABLET ORAL at 08:03

## 2024-03-12 RX ADMIN — BACLOFEN 20 MG: 10 TABLET ORAL at 21:30

## 2024-03-12 RX ADMIN — SODIUM CHLORIDE, PRESERVATIVE FREE 10 ML: 5 INJECTION INTRAVENOUS at 21:37

## 2024-03-12 ASSESSMENT — ENCOUNTER SYMPTOMS
CONSTIPATION: 0
SHORTNESS OF BREATH: 0
COUGH: 0
ABDOMINAL PAIN: 0
NAUSEA: 0
BACK PAIN: 1
DIARRHEA: 0

## 2024-03-12 ASSESSMENT — PAIN SCALES - GENERAL
PAINLEVEL_OUTOF10: 7
PAINLEVEL_OUTOF10: 4
PAINLEVEL_OUTOF10: 4
PAINLEVEL_OUTOF10: 7
PAINLEVEL_OUTOF10: 6
PAINLEVEL_OUTOF10: 6
PAINLEVEL_OUTOF10: 7
PAINLEVEL_OUTOF10: 8
PAINLEVEL_OUTOF10: 7

## 2024-03-12 ASSESSMENT — PAIN DESCRIPTION - LOCATION
LOCATION: LEG
LOCATION: HIP;LEG

## 2024-03-12 ASSESSMENT — PAIN DESCRIPTION - ORIENTATION
ORIENTATION: LEFT

## 2024-03-12 ASSESSMENT — PAIN DESCRIPTION - DESCRIPTORS
DESCRIPTORS: ACHING

## 2024-03-12 ASSESSMENT — PAIN - FUNCTIONAL ASSESSMENT
PAIN_FUNCTIONAL_ASSESSMENT: PREVENTS OR INTERFERES SOME ACTIVE ACTIVITIES AND ADLS

## 2024-03-12 NOTE — PLAN OF CARE
Problem: Skin/Tissue Integrity  Goal: Absence of new skin breakdown  Description: 1.  Monitor for areas of redness and/or skin breakdown  2.  Assess vascular access sites hourly  3.  Every 4-6 hours minimum:  Change oxygen saturation probe site  4.  Every 4-6 hours:  If on nasal continuous positive airway pressure, respiratory therapy assess nares and determine need for appliance change or resting period.  Outcome: Progressing     Problem: Safety - Adult  Goal: Free from fall injury  Outcome: Progressing     Problem: ABCDS Injury Assessment  Goal: Absence of physical injury  Outcome: Progressing     Problem: Pain  Goal: Verbalizes/displays adequate comfort level or baseline comfort level  Outcome: Progressing     Problem: Physical Therapy - Adult  Goal: By Discharge: Performs mobility at highest level of function for planned discharge setting.  See evaluation for individualized goals.  Description: FUNCTIONAL STATUS PRIOR TO ADMISSION: Patient was modified independent using a rollator and small base quad cane for functional mobility. and The patient  required minimal assistance for basic and instrumental ADLs.    HOME SUPPORT PRIOR TO ADMISSION: The patient lived with family and required minimal assistance for transfers and ADLs.    Physical Therapy Goals  Initiated 3/5/2024  Pt stated goal: to get stronger  Pt will be I with LE HEP in 7 days.  Pt will perform bed mobility with Modified [place] in 7 days.  Pt will perform transfers with Contact Guard Assist in 7 days.   Pt will amb 5-10 feet with LRAD safely with Moderate Assist in 7 days.  Pt will verbalize and demonstrate compliance with TTWB/PWB 15# precautions in 7 days.   Pt will demonstrate improvement in standing balance from fair/poor to good/fair in 7 days.     3/12/2024 0920 by Charu Bryan, LULU  Outcome: Progressing

## 2024-03-12 NOTE — PROGRESS NOTES
Comprehensive Nutrition Assessment    Type and Reason for Visit:  Reassess (Goal)    Nutrition Recommendations/Plan:   Continue diet and ONS.  Continue to monitor and document PO and ONS intakes in I/Os.     Malnutrition Assessment:  Malnutrition Status:  Mild malnutrition (03/05/24 1247)    Context:  Acute Illness       Nutrition Assessment:    Admitted for pyogenic arthritis of L. Hip. Screened for LOS. Varied intakes noted w/ average of ~50% of documented meals. Poor intake of B tray d/t pain(managed w/ PCA), no Lunch tray provided at visit- RD communicated w/ FNS Ambassador, tray provided and ONS added. Pt endorsed disliking some food options; however, family/friends provide snacks/foods from OSH at visits. No ONS hx but amenable to drinking while IP. Modify diet given increased protein needs s/p surgery per ERAS protocol. RD to add ONS. (3/12) PO intakes improved w/ 51-75% of meals. Unable to verify ONS acceptance at this time. Continue diet. Dc pending auth approval. Labs: H/H 8.4/30.8, Cl 114, Cr 0.37, Ca 8.2. Meds: Vit C, MVI, FeSul, vancocin, synthroid, lactobacillus, lovenox, toradol, dilaudid, avycaz, tylenol.    Nutrition Related Findings:    NFPE deferred, no obvious wasting observed. No N/V/D/C nor chewing/swallowing difficulties reported. Denied dysphagia hx. Last BM 3/11. Nonpitting LLE edema. Wound Type: Surgical Incision, Wound Vac (Wound vac- L. Hip and Lower back.)       Current Nutrition Intake & Therapies:    Average Meal Intake: 51-75%  Average Supplements Intake: Unable to assess  ADULT DIET; Regular; 60 to 80 gm; High Fiber  ADULT ORAL NUTRITION SUPPLEMENT; Breakfast; Plant Based Oral Supplement    Anthropometric Measures:  Height: 157.5 cm (5' 2\")  Ideal Body Weight (IBW): 110 lbs (50 kg)       Current Body Weight: 77.9 kg (171 lb 11.2 oz) (3/5, RD obtained.), 156.1 % IBW. Weight Source: Bed Scale  Current BMI (kg/m2): 31.4  Usual Body Weight: 70.3 kg (155 lb)  % Weight Change (Calculated):  10.8  Weight Adjustment For: No Adjustment                 BMI Categories: Obese Class 1 (BMI 30.0-34.9)    Estimated Daily Nutrient Needs:  Energy Requirements Based On: Kcal/kg  Weight Used for Energy Requirements: Current  Energy (kcal/day): 9366-1612 kcal/d (25-30 kcal/kg, ERAS postop)  Weight Used for Protein Requirements: Current  Protein (g/day): 117 gm/d (1.5 gm/kg, ERAS postop)  Method Used for Fluid Requirements: 1 ml/kcal  Fluid (ml/day): 1948 mL/d    Nutrition Diagnosis:   Inadequate protein-energy intake related to pain as evidenced by intake 51-75%  Increased nutrient needs related to increase demand for energy/nutrients as evidenced by wounds    Nutrition Interventions:   Food and/or Nutrient Delivery: Continue Current Diet, Continue Oral Nutrition Supplement  Nutrition Education/Counseling: No recommendation at this time  Coordination of Nutrition Care: Continue to monitor while inpatient  Plan of Care discussed with: RN    Goals:  Previous Goal Met: Progressing toward Goal(s)  Goals: Meet at least 75% of estimated needs, PO intake 75% or greater, within 7 days       Nutrition Monitoring and Evaluation:   Behavioral-Environmental Outcomes: None Identified  Food/Nutrient Intake Outcomes: Food and Nutrient Intake, Supplement Intake  Physical Signs/Symptoms Outcomes: Biochemical Data, Meal Time Behavior, Weight, Skin    Discharge Planning:    Continue current diet     Yojana Roman RD  Contact: ext. [phone].

## 2024-03-12 NOTE — WOUND CARE
Negative Pressure    NAME:  Paula Renteria  YOB: 1970  MEDICAL RECORD NUMBER:  [id number]  DATE:  2/28/2024    Applied Negative Pressure to non-healing surgical incision to lumbar.  [x] Applied skin barrier prep to janusz-wound.   [x] Cut strips of plastic drape to picture frame wound so that janusz-wound is     covered with the drape.   [x] If bridging dressing to less prominent site, cover any intact skin that will come in contact with the Negative Pressure Therapy sponge, gauze or channel drain with plastic drape.  The sponge should never touch intact skin.   [x] Cut sponge, gauze or channel drain to size which will fit into the wound/ulcer bed without being forced.   [x] Be sure the sponge is large enough to hold the entire round plastic flange which is attached to the tubing.  Never allow flange to be larger than the sponge or it will produce suction damaging intact skin.  Total number of individual pieces of foam used within the wound bed: 1 x white foam and 1 x black foam  [x] If bridging the dressing away from the primary site, be sure the bridge leads to a piece of sponge large enough to hold the entire flange without allowing any of the flange to overlap onto intact skin.   [x] Covered sponge, gauze or channel drain with plastic drape.   [x] Cut a hole in this plastic drape directly over the sponge the same size as the plastic drain tubing.   [x] Removed plastic liner from flange and apply it directly over the hole you cut.   [x] Removed the plastic cover from the flange.   [x] Attached the tubing to the wound/ulcer Negative Pressure Therapy and turn it on to be sure a vacuum is created and that there are no leaks.   [x] If air leaks occur, use plastic drape to patch them.   [] Secured Negative Pressure Therapy dressing with ace wrap loosely if located on an extremity. Maintain tubing outside of ace wrap. Tubing must not exert pressure on intact skin.    Applied per   Guidelines    Patient tolerated moderately well.   Removed dressing and irrigated with NS. An increase in granulation tissue is felt over previously exposed bone.  Packed white foam and then black foam.  Bridged track pad to left back.  Applied 6x6 border foams over NPWT dressing.  Next dressing change while in-patient will be for Friday 3/15/24 by WCN.  For discharge, remove NPWT dressing and apply a wet / dry.       If wound vac is not working properly: 1) Check to see if track pad tubing is clogged.  If so, remove track pad, apply film over exposed foam, cut dime size hole in film, and apply a new track pad.  2) If film is leaking, find area with leak and reinforce with film.   3) If leak cannot be fixed, remove dressing, apply wet/dry dressing, and inform the WCN.

## 2024-03-12 NOTE — PLAN OF CARE
PHYSICAL THERAPY TREATMENT     Patient: Paula Renteria (53 y.o. female)  Date: 3/12/2024  Diagnosis: Pyogenic arthritis of left hip, due to unspecified organism (HCC) [M00.9]  Pain of left hip [M25.552] Pyogenic arthritis of left hip, due to unspecified organism (HCC)  Procedure(s) (LRB):  EXCISIONAL DEBRIDEMENT OF LEFT HIP WOUND (Left) 4 Days Post-Op  Precautions: Fall Risk, Contact Precautions, Weight Bearing, Bed Alarm     Left Lower Extremity Weight Bearing: Toe Touch Weight Bearing Partial Weight Bearing Percentage Or Pounds: 15 LBS              Recommendations for nursing mobility: Out of bed to chair for meals, Encourage HEP in prep for ADLs/mobility; see handout for details, Frequent repositioning to prevent skin breakdown, LE elevation for management of edema, Use of BSC for toileting , and Assist x1    In place during session: Peripheral IV, Accordian Drain, and Wound vac    Chart, physical therapy assessment, plan of care and goals were reviewed.  ASSESSMENT  Patient continues with skilled PT services and is progressing towards goals. Pt sitting in recliner upon PT arrival, agreeable to session. Pt A&O x 4. (See below for objective details and assist levels).     Overall pt tolerated session fair today with seated therex, transfers and sitting tolerance. Pt demo's significant improvement with functional mobility to date requiring mostly CGA-min A for safety. Tolerated seated therex in recliner with VC for proper form and control. Pt ER on LLE, educated on proper positioning and therex to prevent decrease in ROM. Performed stand pivot from recliner<>bed with CGA and increased time. Pt educated on extended LLE to reduce pressure and pain. Demo's forward flexed posture in standing despite cueing. Pt noted to demo R L lean in sitting secondary to pain. Provided with ice and educated on frequency. Pt left seated in recliner with all needs in reach.  Will continue to benefit from skilled PT services, and  will continue to progress as tolerated. Potential barriers for safe discharge: pts current support system is unable to meet their requirements for physical assistance, pt is a high fall risk, pt is not safe to be alone, and concern for pt safely navigating or managing the home environment. Current PT DC recommendation Skilled Nursing Facility once medically appropriate.     Start of Session End of Session   SPO2 (%) 97 98   Heart Rate (BPM) 76 88     GOALS:    Problem: Physical Therapy - Adult  Goal: By Discharge: Performs mobility at highest level of function for planned discharge setting.  See evaluation for individualized goals.  Description: FUNCTIONAL STATUS PRIOR TO ADMISSION: Patient was modified independent using a rollator and small base quad cane for functional mobility. and The patient  required minimal assistance for basic and instrumental ADLs.    HOME SUPPORT PRIOR TO ADMISSION: The patient lived with family and required minimal assistance for transfers and ADLs.    Physical Therapy Goals  Initiated 3/5/2024  Pt stated goal: to get stronger  Pt will be I with LE HEP in 7 days.  Pt will perform bed mobility with Modified Fowler in 7 days.  Pt will perform transfers with Contact Guard Assist in 7 days.   Pt will amb 5-10 feet with LRAD safely with Moderate Assist in 7 days.  Pt will verbalize and demonstrate compliance with TTWB/PWB 15# precautions in 7 days.   Pt will demonstrate improvement in standing balance from fair/poor to good/fair in 7 days.     Outcome: Progressing          PLAN :  Patient continues to benefit from skilled intervention to address functional impairments. Continue treatment per established plan of care to address goals.    Recommendation for discharge: (in order for the patient to meet his/her long term goals)  Skilled Nursing Facility    IF patient discharges home will need the following DME:continuing to assess with progress     SUBJECTIVE:   Patient stated “It's going to

## 2024-03-12 NOTE — PROGRESS NOTES
Vancomycin Dosing Consult  Paula Renteria is a 53 y.o. female with GPC bacteremia. Pharmacy was consulted by Dr. Garrison to dose and monitor vancomycin. Today is day 8.    Antibiotic regimen: Vancomycin + Avycaz    Temp (24hrs), Av.6 °F (36.4 °C), Min:97.5 °F (36.4 °C), Max:97.7 °F (36.5 °C)    Recent Labs     03/10/24  0838 24  1056 24  0748   WBC 6.6 6.6 5.9   CREATININE 0.44* 0.42* 0.37*   BUN 14      Est CrCl: > 100 mL/min  Concomitant nephrotoxic drugs: Contrast agents    Cultures:   24: Blood cultures x 2: CoNS in 1 of 4 (< 24 hrs)  3/1 Blood: NGTD, final  3/4 Tissue: NG  3/4 Wound: NGTD  3/8 Tissue: NGTD     MRSA Swab: Not ordered, patient already received first dose of vancomycin    Target range: AUC/ISABELLA 400-600    Recent level history:  Date/Time Dose & Interval Measured Level (mcg/mL) Associated AUC/ISABELLA   3/1 @ 1413 1750mg x 1, then 1500mg q12h 18.4 727   3/4 @ 0632 1000 mg q12h 9.5 436   3/7 @ 1434 1000 mg q8h 11.4 462   3/10 @ 0838 1000 mg q8h 26.8 702   3/12 @1357 1000 mg q12h 8.9 352        Assessment/Plan:   Documented allergy to vancomycin with hives. Per patient, she has tolerated vancomycin if pre-medicated with Benadryl.   Renal function normal and stable  Level is below target. Increase Vancomycin 1250 mg q12h with predicted AUC of 439  Next level scheduled for 3/14 @ 1300  Antimicrobial stop date TBD

## 2024-03-12 NOTE — CARE COORDINATION
CM spoke with patient about checking other areas for placement. [facility] has accepted and informed patient. Patient gave ok for auth to be started. Requested for auth to start.

## 2024-03-12 NOTE — PLAN OF CARE

## 2024-03-12 NOTE — PROGRESS NOTES
Infectious Disease Progress Note           Subjective:   Left thigh pain is better, denies new complaints, afebrile, WBC WNLs, no acute events since last seen   Objective:   Physical Exam:     /60   Pulse 74   Temp 97.7 °F (36.5 °C) (Oral)   Resp 19   Ht 1.575 m (5' 2\")   Wt 70.3 kg (155 lb)   SpO2 97%   BMI 28.35 kg/m²    O2 Device: None (Room air)    Temp (24hrs), Av.6 °F (36.4 °C), Min:97.5 °F (36.4 °C), Max:97.7 °F (36.5 °C)    701 - 1900  In: -   Out: 250 [Drains:250]   03/10 1901 - 700  In: -   Out: 710 [Urine:600; Drains:110]    General: NAD, AAO x 4  HEENT: GIORGI, Moist mucosa   Lungs: decreased at the bases, no wheeze/rhonchi   Heart: S1S2+, RRR, no murmur  Abdo: Soft, NT, ND, +BS   : No higginbotham cath   Exts left anterior hip dressing in place   Skin: Wound Vac on lower back     Data Review:       Recent Days:    Recent Labs     03/10/24  0838 03/10/24  1119 24  1056 24  0748   WBC 6.6  --  6.6 5.9   HGB 6.9* 8.0* 7.7* 8.4*   HCT 23.4* 27.6* 26.8* 30.8*     --  431* 368       Recent Labs     03/10/24  0838 24  1056 24  0748   BUN 14 12 12   CREATININE 0.44* 0.42* 0.37*         Lab Results   Component Value Date/Time    CRP 3.07 2024 10:56 AM     Microbiology     Results       Procedure Component Value Units Date/Time    Culture, Tissue [[id number]] Collected: 24 1528    Order Status: Completed Specimen: Tissue from Joint, Hip Updated: 03/10/24 1018     Special Requests No Special Requests        Gram stain Rare WBCs seen         No organisms seen        Culture No growth thus far       Clostridium Difficile Toxin/Antigen [[id number]] Collected: 24 0030    Order Status: Canceled Specimen: Stool     Culture, Tissue [[id number]] Collected: 24 1319    Order Status: Completed Specimen: Tissue Updated: 24 0656     Special Requests No Special Requests        Gram stain Occasional WBCs seen          No organisms seen        Culture       No growth thus far, holding 14 days.          Culture, Wound [[id number]] Collected: 03/04/24 1309    Order Status: Completed Specimen: Swab from Joint, Hip Updated: 03/06/24 0648     Special Requests No Special Requests        Gram stain Occasional WBCs seen         No organisms seen        Culture       No growth thus far, holding 14 days.          Culture, Wound [[id number]] Collected: 03/04/24 1309    Order Status: Completed Specimen: Swab from Joint, Hip Updated: 03/06/24 0648     Special Requests No Special Requests        Gram stain Occasional WBCs seen         No organisms seen        Culture       No growth thus far, holding 14 days.          Culture, Tissue [[id number]] Collected: 03/04/24 1309    Order Status: Completed Specimen: Tissue from Joint, Hip Updated: 03/06/24 0652     Special Requests No Special Requests        Gram stain 2+ WBCs seen         No organisms seen        Culture       No growth thus far, holding 14 days.          Culture, Tissue [[id number]] Collected: 03/04/24 1309    Order Status: Completed Specimen: Bone from Joint, Hip Updated: 03/06/24 0649     Special Requests No Special Requests        Gram stain 1+ WBCs seen         No organisms seen        Culture       No growth thus far, holding 14 days.          Culture, Tissue [[id number]] Collected: 03/04/24 1309    Order Status: Completed Specimen: Tissue from Joint, Hip Updated: 03/06/24 0654     Special Requests No Special Requests        Gram stain 1+ WBCs seen         No organisms seen        Culture       No growth thus far, holding 14 days.          Culture, Tissue [[id number]] Collected: 03/04/24 1309    Order Status: Completed Specimen: Tissue Updated: 03/06/24 0655     Special Requests No Special Requests        Gram stain 1+ WBCs seen         No organisms seen        Culture       No growth thus far, holding 14 days.          Culture, Tissue [[id number]] Collected: 03/04/24 1246     Order Status: Canceled Specimen: Tissue     Culture, Tissue [[id number]] Collected: 03/04/24 1209    Order Status: Completed Specimen: Tissue from Joint, Hip Updated: 03/06/24 0652     Special Requests No Special Requests        Gram stain 1+ WBCs seen         No organisms seen        Culture       No growth thus far, holding 14 days.          Culture, Blood 1 [[id number]] Collected: 03/01/24 1413    Order Status: Completed Specimen: Blood Updated: 03/07/24 1508     Special Requests --        No Special Requests  Left  Hand       Culture No growth 6 days       Culture, Blood, PCR ID Panel [[id number]] Collected: 02/28/24 1450    Order Status: Canceled Specimen: Blood     Culture, Blood, PCR ID Panel [[id number]]  (Abnormal) Collected: 02/28/24 1450    Order Status: Completed Specimen: Blood Updated: 02/29/24 1613     Accession Number Blood Culture Bottle        Enterococcus faecalis by PCR Not detected     Enterococcus faecium by PCR Not detected     Listeria monocytogenes by PCR Not detected     STAPHYLOCOCCUS Detected        Staphylococcus Aureus Not detected     Staphylococcus epidermidis by PCR Not detected     Staphylococcus lugdunensis by PCR Not detected     STREPTOCOCCUS Not detected     Streptococcus agalactiae (Group B) Not detected     Strep pneumoniae Not detected     Strep pyogenes,(Grp. A) Not detected     Acinetobacter calcoac baumannii complex by PCR Not detected     Bacteroides fragilis by PCR Not detected     Enterobacteriaceae by PCR Not detected     Enterobacter cloacae complex by PCR Not detected     Escherichia Coli Not detected     Klebsiella aerogenes by PCR Not detected     Klebsiella oxytoca by PCR Not detected     Klebsiella pneumoniae group by PCR Not detected     Proteus by PCR Not detected     Salmonella species by PCR Not detected     Serratia marcescens by PCR Not detected     Haemophilus Influenzae by PCR Not detected     Neisseria meningitidis by PCR Not detected     Pseudomonas

## 2024-03-13 VITALS
DIASTOLIC BLOOD PRESSURE: 70 MMHG | OXYGEN SATURATION: 100 % | SYSTOLIC BLOOD PRESSURE: 117 MMHG | BODY MASS INDEX: 28.52 KG/M2 | WEIGHT: 155 LBS | HEART RATE: 64 BPM | RESPIRATION RATE: 17 BRPM | HEIGHT: 62 IN | TEMPERATURE: 98 F

## 2024-03-13 PROBLEM — D50.9 MICROCYTIC ANEMIA: Status: ACTIVE | Noted: 2024-03-13

## 2024-03-13 LAB
ANION GAP SERPL CALC-SCNC: 3 MMOL/L (ref 5–15)
BACTERIA SPEC CULT: NORMAL
BUN SERPL-MCNC: 9 MG/DL (ref 6–20)
BUN/CREAT SERPL: 23 (ref 12–20)
CA-I BLD-MCNC: 8.5 MG/DL (ref 8.5–10.1)
CHLORIDE SERPL-SCNC: 112 MMOL/L (ref 97–108)
CO2 SERPL-SCNC: 26 MMOL/L (ref 21–32)
CREAT SERPL-MCNC: 0.4 MG/DL (ref 0.55–1.02)
ERYTHROCYTE [DISTWIDTH] IN BLOOD BY AUTOMATED COUNT: 21 % (ref 11.5–14.5)
GLUCOSE SERPL-MCNC: 88 MG/DL (ref 65–100)
GRAM STN SPEC: NORMAL
GRAM STN SPEC: NORMAL
HCT VFR BLD AUTO: 25.4 % (ref 35–47)
HGB BLD-MCNC: 7.3 G/DL (ref 11.5–16)
Lab: NORMAL
MCH RBC QN AUTO: 23.5 PG (ref 26–34)
MCHC RBC AUTO-ENTMCNC: 28.7 G/DL (ref 30–36.5)
MCV RBC AUTO: 81.9 FL (ref 80–99)
NRBC # BLD: 0 K/UL (ref 0–0.01)
NRBC BLD-RTO: 0 PER 100 WBC
PLATELET # BLD AUTO: 439 K/UL (ref 150–400)
PMV BLD AUTO: 9.6 FL (ref 8.9–12.9)
POTASSIUM SERPL-SCNC: 4.1 MMOL/L (ref 3.5–5.1)
RBC # BLD AUTO: 3.1 M/UL (ref 3.8–5.2)
SODIUM SERPL-SCNC: 141 MMOL/L (ref 136–145)
WBC # BLD AUTO: 6.3 K/UL (ref 3.6–11)

## 2024-03-13 PROCEDURE — 6370000000 HC RX 637 (ALT 250 FOR IP): Performed by: ORTHOPAEDIC SURGERY

## 2024-03-13 PROCEDURE — 6360000002 HC RX W HCPCS: Performed by: INTERNAL MEDICINE

## 2024-03-13 PROCEDURE — 97535 SELF CARE MNGMENT TRAINING: CPT

## 2024-03-13 PROCEDURE — 2500000003 HC RX 250 WO HCPCS: Performed by: HOSPITALIST

## 2024-03-13 PROCEDURE — 2580000003 HC RX 258: Performed by: ORTHOPAEDIC SURGERY

## 2024-03-13 PROCEDURE — 36415 COLL VENOUS BLD VENIPUNCTURE: CPT

## 2024-03-13 PROCEDURE — 80048 BASIC METABOLIC PNL TOTAL CA: CPT

## 2024-03-13 PROCEDURE — 2580000003 HC RX 258: Performed by: INTERNAL MEDICINE

## 2024-03-13 PROCEDURE — 6360000002 HC RX W HCPCS: Performed by: ORTHOPAEDIC SURGERY

## 2024-03-13 PROCEDURE — 85027 COMPLETE CBC AUTOMATED: CPT

## 2024-03-13 PROCEDURE — 99232 SBSQ HOSP IP/OBS MODERATE 35: CPT | Performed by: INTERNAL MEDICINE

## 2024-03-13 RX ORDER — OXCARBAZEPINE 300 MG/1
300 TABLET, FILM COATED ORAL
Qty: 60 TABLET | Refills: 3 | Status: SHIPPED | OUTPATIENT
Start: 2024-03-14

## 2024-03-13 RX ORDER — OXCARBAZEPINE 300 MG/1
300 TABLET, FILM COATED ORAL
Qty: 60 TABLET | Refills: 3 | Status: SHIPPED | OUTPATIENT
Start: 2024-03-13

## 2024-03-13 RX ORDER — OXYCODONE HYDROCHLORIDE 5 MG/1
5 TABLET ORAL EVERY 6 HOURS PRN
Qty: 12 TABLET | Refills: 0 | Status: SHIPPED | OUTPATIENT
Start: 2024-03-13 | End: 2024-03-16

## 2024-03-13 RX ADMIN — OXCARBAZEPINE 300 MG: 300 TABLET, FILM COATED ORAL at 08:53

## 2024-03-13 RX ADMIN — Medication 1 CAPSULE: at 08:53

## 2024-03-13 RX ADMIN — OXCARBAZEPINE 300 MG: 300 TABLET, FILM COATED ORAL at 12:15

## 2024-03-13 RX ADMIN — HYDROMORPHONE HYDROCHLORIDE 1 MG: 1 INJECTION, SOLUTION INTRAMUSCULAR; INTRAVENOUS; SUBCUTANEOUS at 12:35

## 2024-03-13 RX ADMIN — DULOXETINE HYDROCHLORIDE 60 MG: 30 CAPSULE, DELAYED RELEASE ORAL at 08:53

## 2024-03-13 RX ADMIN — FERROUS SULFATE TAB 325 MG (65 MG ELEMENTAL FE) 325 MG: 325 (65 FE) TAB at 15:14

## 2024-03-13 RX ADMIN — CARVEDILOL 6.25 MG: 3.12 TABLET, FILM COATED ORAL at 08:53

## 2024-03-13 RX ADMIN — THERA TABS 1 TABLET: TAB at 08:53

## 2024-03-13 RX ADMIN — ACETAMINOPHEN 1000 MG: 500 TABLET ORAL at 05:15

## 2024-03-13 RX ADMIN — CEFTAZIDIME, AVIBACTAM 2.5 G: 2; .5 POWDER, FOR SOLUTION INTRAVENOUS at 12:35

## 2024-03-13 RX ADMIN — CARVEDILOL 6.25 MG: 3.12 TABLET, FILM COATED ORAL at 15:14

## 2024-03-13 RX ADMIN — OXCARBAZEPINE 300 MG: 300 TABLET, FILM COATED ORAL at 08:54

## 2024-03-13 RX ADMIN — SODIUM CHLORIDE 1250 MG: 9 INJECTION, SOLUTION INTRAVENOUS at 15:12

## 2024-03-13 RX ADMIN — SODIUM CHLORIDE, PRESERVATIVE FREE 10 ML: 5 INJECTION INTRAVENOUS at 08:52

## 2024-03-13 RX ADMIN — OXCARBAZEPINE 300 MG: 300 TABLET, FILM COATED ORAL at 15:14

## 2024-03-13 RX ADMIN — SODIUM CHLORIDE 1250 MG: 9 INJECTION, SOLUTION INTRAVENOUS at 02:12

## 2024-03-13 RX ADMIN — FERROUS SULFATE TAB 325 MG (65 MG ELEMENTAL FE) 325 MG: 325 (65 FE) TAB at 08:54

## 2024-03-13 RX ADMIN — ACETAMINOPHEN 1000 MG: 500 TABLET ORAL at 12:15

## 2024-03-13 RX ADMIN — CEFTAZIDIME, AVIBACTAM 2.5 G: 2; .5 POWDER, FOR SOLUTION INTRAVENOUS at 05:15

## 2024-03-13 RX ADMIN — OXYCODONE HYDROCHLORIDE AND ACETAMINOPHEN 1000 MG: 500 TABLET ORAL at 08:53

## 2024-03-13 RX ADMIN — BACLOFEN 20 MG: 10 TABLET ORAL at 08:53

## 2024-03-13 RX ADMIN — HYDROMORPHONE HYDROCHLORIDE 1 MG: 1 INJECTION, SOLUTION INTRAMUSCULAR; INTRAVENOUS; SUBCUTANEOUS at 16:32

## 2024-03-13 RX ADMIN — LEVOTHYROXINE SODIUM 100 MCG: 0.07 TABLET ORAL at 06:59

## 2024-03-13 RX ADMIN — ACETAMINOPHEN 1000 MG: 500 TABLET ORAL at 16:32

## 2024-03-13 ASSESSMENT — PAIN DESCRIPTION - LOCATION
LOCATION: LEG

## 2024-03-13 ASSESSMENT — PAIN SCALES - GENERAL
PAINLEVEL_OUTOF10: 7
PAINLEVEL_OUTOF10: 1
PAINLEVEL_OUTOF10: 7

## 2024-03-13 ASSESSMENT — PAIN DESCRIPTION - DESCRIPTORS
DESCRIPTORS: ACHING

## 2024-03-13 ASSESSMENT — PAIN DESCRIPTION - ORIENTATION
ORIENTATION: LEFT

## 2024-03-13 NOTE — PROGRESS NOTES
OCCUPATIONAL THERAPY TREATMENT  Patient: Paula Renteria (53 y.o. female)  Date: 3/13/2024  Primary Diagnosis: Pyogenic arthritis of left hip, due to unspecified organism (HCC) [M00.9]  Pain of left hip [M25.552]  Procedure(s) (LRB):  EXCISIONAL DEBRIDEMENT OF LEFT HIP WOUND (Left) 5 Days Post-Op   Precautions: Fall Risk, Contact Precautions, Weight Bearing, Bed Alarm   Left Lower Extremity Weight Bearing: Toe Touch Weight Bearing            Recommendations for nursing mobility: Encourage HEP in prep for ADLs/mobility; see handout for details, Frequent repositioning to prevent skin breakdown, Use of bed/chair alarm for safety, Use of BSC for toileting , and Assist x2    In place during session: Peripheral IV, Accordian Drain, and Wound vac    Chart, occupational therapy assessment, plan of care, and goals were reviewed.  ASSESSMENT  Patient continues with skilled OT services and is progressing towards goals. Pt presented in recliner upon OT arrival, agreeable to session. Pt A&O x 4. Pt completed UB and LB bathing, UB and LB dressing see below for details.  Pt educated on dressing L LE first and doffing last.  Pt verbalized understanding but unable to reach to don on L LE.  Pt able to don R LE.  Pt stood with Min A and was Max A to manage clothing up to waist. Pt left in recliner with call bell and all needs met.(See below for objective details and assist levels).     Overall pt tolerated session Fair today with frequent rest breaks.  Potential barriers for safe discharge: pts current support system is unable to meet their requirements for physical assistance, pt is a high fall risk, pt is not safe to be alone, concern for pt safely navigating or managing the home environment, and pt has new weight bearing restrictions limiting activity or patient is unable to maintain. Current OT recommendations for discharge Skilled Nursing Facility. Will continue to benefit from skilled OT services, and will continue to progress as

## 2024-03-13 NOTE — CARE COORDINATION
Patient has auth for [facility]. Can go to room 41A. Informed patient. Transport being setup through medicaid.    Nurse to call report at [phone]    Trip number: [phone]      Transition of Care Plan:    RUR: 17%  Prior Level of Functioning: home with home health and PCa's  Disposition: SNF  If SNF or IPR: Date FOC offered: 03/07/2024  Date FOC received: 03/07/2024  Accepting facility: [facility]  Date authorization started with reference number: 03/12/2024  Date authorization received and expires: na  Follow up appointments:   DME needed:   Transportation at discharge: medicaid  IM/IMM Medicare/ letter given: 03/13/2024  Is patient a  and connected with [facility]? na  If yes, was  transfer form completed and [facility] notified? na  Caregiver Contact: na  Discharge Caregiver contacted prior to discharge? na  Care Conference needed? na  Barriers to discharge: na

## 2024-03-13 NOTE — PROGRESS NOTES
4 Eyes Skin Assessment     NAME:  Paula Renteria  YOB: 1970  MEDICAL RECORD NUMBER:  [id number]    The patient is being assessed for  Other weekly assessment    I agree that at least one RN has performed a thorough Head to Toe Skin Assessment on the patient. ALL assessment sites listed below have been assessed.      Areas assessed by both nurses:    Head, Face, Ears, Shoulders, Back, Chest, Arms, Elbows, Hands, Sacrum. Buttock, Coccyx, Ischium, Legs. Feet and Heels, and Under Medical Devices         Does the Patient have a Wound? Yes wound(s) were present on assessment. LDA wound assessment was Initiated and completed by RN       Ranjit Prevention initiated by RN: Yes  Wound Care Orders initiated by RN: Yes    Pressure Injury (Stage 3,4, Unstageable, DTI, NWPT, and Complex wounds) if present, place Wound referral order by RN under : Yes    New Ostomies, if present place, Ostomy referral order under : No     Nurse 1 eSignature: Electronically signed by Renita Velasquez RN on 3/13/24 at 12:02 PM EDT    **SHARE this note so that the co-signing nurse can place an eSignature**    Nurse 2 eSignature: Electronically signed by George Flores RN on 3/13/24 at 3:04 PM EDT

## 2024-03-13 NOTE — PLAN OF CARE
Problem: Skin/Tissue Integrity  Goal: Absence of new skin breakdown  Description: 1.  Monitor for areas of redness and/or skin breakdown  2.  Assess vascular access sites hourly  3.  Every 4-6 hours minimum:  Change oxygen saturation probe site  4.  Every 4-6 hours:  If on nasal continuous positive airway pressure, respiratory therapy assess nares and determine need for appliance change or resting period.  Outcome: Progressing     Problem: ABCDS Injury Assessment  Goal: Absence of physical injury  Outcome: Progressing     Problem: Pain  Goal: Verbalizes/displays adequate comfort level or baseline comfort level  Outcome: Progressing

## 2024-03-13 NOTE — DISCHARGE INSTR - COC
Continuity of Care Form    Patient Name: Paula Renteria   :  1970  MRN:  [id number]    Admit date:  2024  Discharge date:  ***    Code Status Order: Full Code   Advance Directives:   Advance Care Flowsheet Documentation       Date/Time Healthcare Directive Type of Healthcare Directive Copy in Chart Healthcare Agent Appointed Healthcare Agent's Name Healthcare Agent's Phone Number    24 1250 No, patient does not have an advance directive for healthcare treatment -- -- -- -- --    24 0724 No, patient does not have an advance directive for healthcare treatment -- -- -- -- --            Admitting Physician:  Maddi Doe MD  PCP: Lion Liu MD    Discharging Nurse: ***  Discharging Hospital Unit/Room#: 522/01  Discharging Unit Phone Number: ***    Emergency Contact:   Extended Emergency Contact Information  Primary Emergency Contact: Ghada Martinez  Address: [address] [place] [address]  Home Phone: [phone]  Mobile Phone: [phone]  Relation: Other Relative  [place] [address] [facility]  Home Phone: [phone]  Relation: Parent    Past Surgical History:  Past Surgical History:   Procedure Laterality Date    BACK SURGERY      CHOLECYSTECTOMY      COLON SURGERY      HIP SURGERY Left 3/4/2024    DEBRIDEMENT LEFT HIP, GIRDLESTONE RESECTION LEFT HIP, ANTIBIOTIC CEMENT SPACER PLACEMENT LEFT HIP, ADDUCTOR TENOTOMY LEFT HIP performed by Haris Ramirez MD at [facility] MAIN OR    JOINT REPLACEMENT      LEG SURGERY Left 3/8/2024    EXCISIONAL DEBRIDEMENT OF LEFT HIP WOUND performed by Haris Ramirez MD at [facility] MAIN OR    REVISION TOTAL KNEE ARTHROPLASTY Right        Immunization History:   Immunization History   Administered Date(s) Administered    Tetanus Toxoid, absorbed 2014       Active Problems:  Patient Active Problem List   Diagnosis Code    Open wound of lower back S31.000A

## 2024-03-13 NOTE — PROGRESS NOTES
Physician Progress Note      PATIENT:               PATRICIA AMOS  CSN #:                  [id number]  :                       1970  ADMIT DATE:       2024 1:47 PM  DISCH DATE:  RESPONDING  PROVIDER #:        Haris Ramirez MD          QUERY TEXT:    Patient admitted with left hip septic arthritis. Per Op note dated 3/8/24,   documentation of debridement using curettes, rongeur and pulse irrigation. To   accurately reflect the procedure performed please document if debridement was   excisional or nonexcisional and the deepest depth of tissue removed as down to   and including:    The medical record reflects the following:  Risk Factors:  -53 F presented on 24 with left hip septic arthritis    Clinical Indicators:  -per 3/8/24 procedure note, \"The joint was exposed with appropriate   retraction, and thorough debridement was performed using Smith curettes,   rongeur and pulse irrigation with 6 L of normal saline.  Tissues were obtained   and sent for tissue cultures\".    Treatment:  -OR procedures, ID and Ortho consult, IV ABT, wound care    Thank you,  RENU Anglin, RN, CCDS, CRCR  [email or web address] or contact via Perfect Serve  Options provided:  -- Nonexcisional debridement of bone  -- Excisional debridement of bone  -- Other - I will add my own diagnosis  -- Disagree - Not applicable / Not valid  -- Disagree - Clinically unable to determine / Unknown  -- Refer to Clinical Documentation Reviewer    PROVIDER RESPONSE TEXT:    Excisional debridement of bone of left hip joint was performed during   procedure on 3/8/24.    Query created by: Dalila Boudreaux on 3/11/2024 9:17 AM      Electronically signed by:  Haris Ramirez MD 3/13/2024 10:19 AM

## 2024-03-13 NOTE — DISCHARGE SUMMARY
Discharge Summary    Name: Paula Renteria  [id number]  YOB: 1970 (Age: 53 y.o.)   Date of Admission: 2/28/2024  Date of Discharge: 3/13/2024  Attending Physician: Ruddy Driscoll MD    Discharge Diagnosis:   Principal Problem:    Pyogenic arthritis of left hip, due to unspecified organism (HCC)  Active Problems:    Osteomyelitis of low back (HCC)    Pain of left hip    Chronic osteomyelitis of hip (HCC)    Direct infection of unspecified hip in infectious and parasitic diseases classified elsewhere (HCC)    Microcytic anemia  Resolved Problems:    * No resolved hospital problems. *       Consultations:  IP CONSULT TO ORTHOPEDIC SURGERY  IP CONSULT TO INFECTIOUS DISEASES  IP WOUND CARE NURSE CONSULT TO EVAL  IP CONSULT TO PHARMACY  IP WOUND CARE NURSE CONSULT TO EVAL  IP CONSULT TO CASE MANAGEMENT      Brief Admission History/Reason for Admission Per Maddi Doe MD:   Septic arthritis/chronic osteomyelitis involving left hip    Brief Hospital Course by Main Problems:   Patient is a 53-year-old female with past medical history of several lumbar fusion surgeries, most recent fusion in 2022 that required several washout surgeries and she has had an open wound since who was admitted on 2/28/2024 for septic arthritis of the left hip.  CT revealed septic arthritis left hip with collapse of the left femoral head.  Diffuse osteosclerosis of the left hemipelvis can be seen with chronic osteomyelitis.  Cultures collected on 2/12/2024 revealed moderate Pseudomonas MDRO and moderate bacteroides thetaiotaomicron beta lactamase positive.  Infectious disease evaluated patient and initiated patient on Avycaz.  Anticipating 6 to 8 weeks of IV cefepime and vancomycin at discharge.  PICC line in place.  Ortho evaluated patient and perioperative risk revealed 0.5% risk of MI or cardiac arrest.  [employer] of surgeons risk calculator shows an average risk of serious  tablet  Commonly known as: LIORESAL               Where to Get Your Medications        These medications were sent to [facility] - [place], [place] - [address] - [phone] - F [phone]  [address] [facility] [address]      Phone: [phone]   carvedilol 6.25 MG tablet  famotidine 20 MG tablet  ferrous sulfate 325 (65 Fe) MG tablet  OXcarbazepine 300 MG tablet  OXcarbazepine 300 MG tablet             DISPOSITION:    Home with Family:       Home with HH/PT/OT/RN:    SNF/LTC: X   ABDI:    OTHER:            Code status:   Recommended diet: regular diet  Recommended activity: activity as tolerated  Wound care: See surgical/procedure care instructions      Follow up with:   PCP : Lion Liu MD Abbasi, Gohar M, MD  [address] [place] [address] [phone]    Follow up      Carlos Newsome MD  [address] [address] [phone]    Schedule an appointment as soon as possible for a visit in 2 week(s)  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 Septic arthritis left hip.    Promise Garrison MD  [address] [place] [address] [phone]    Schedule an appointment as soon as possible for a visit in 3 week(s)      [facility] EMERGENCY DEP  [address] [place] [address] [phone]  Follow up  As needed, If symptoms worsen          Total time in minutes spent coordinating this discharge (includes going over instructions, follow-up, prescriptions, and preparing report for sign off to her PCP) :  35 minutes

## 2024-03-13 NOTE — PROGRESS NOTES
Infectious Disease Progress Note           Subjective:   Remains afebrile w a normal WBC on routine labs, denies new complaints, no acute events since last seen   Objective:   Physical Exam:     /70   Pulse 64   Temp 98 °F (36.7 °C) (Oral)   Resp 18   Ht 1.575 m (5' 2\")   Wt 70.3 kg (155 lb)   SpO2 100%   BMI 28.35 kg/m²    O2 Device: None (Room air)    Temp (24hrs), Av.6 °F (36.4 °C), Min:97.3 °F (36.3 °C), Max:98 °F (36.7 °C)    701 - 1900  In: 600 [P.O.:600]  Out: -    1901 -  07  In: 2226.7 [P.O.:1800]  Out: 785 [Urine:400; Drains:385]    General: NAD, AAO x 4  HEENT: GIORGI, Moist mucosa   Lungs: decreased at the bases, no wheeze/rhonchi   Heart: S1S2+, RRR, no murmur  Abdo: Soft, NT, ND, +BS   : No higginbotham cath   Exts left anterior hip dressing in place   Skin: Wound Vac on lower back     Data Review:       Recent Days:    Recent Labs     24  1056 24  0748 24  0816   WBC 6.6 5.9 6.3   HGB 7.7* 8.4* 7.3*   HCT 26.8* 30.8* 25.4*   * 368 439*       Recent Labs     24  1056 24  0748 24  0816   BUN 12 12 9   CREATININE 0.42* 0.37* 0.40*         Lab Results   Component Value Date/Time    CRP 3.07 2024 10:56 AM     Microbiology     Results       Procedure Component Value Units Date/Time    Culture, Tissue [[id number]] Collected: 24 1528    Order Status: Completed Specimen: Tissue from Joint, Hip Updated: 24 0858     Special Requests No Special Requests        Gram stain Rare WBCs seen         No organisms seen        Culture No growth 4 days       Clostridium Difficile Toxin/Antigen [[id number]] Collected: 24 0030    Order Status: Canceled Specimen: Stool     Culture, Tissue [[id number]] Collected: 24 1319    Order Status: Completed Specimen: Tissue Updated: 24 0656     Special Requests No Special Requests        Gram stain Occasional WBCs seen         No organisms seen             4  Diarrhea, likely antibiotics associated, continue Imodium, test for C.diff if worsening     5. Left medial thigh hematoma, 2x 4 cm. Intermittent pain, continue conservative mgt     Promise Garrison MD    3/13/2024

## 2024-03-15 ENCOUNTER — TELEPHONE (OUTPATIENT)
Age: 54
End: 2024-03-15

## 2024-03-15 LAB
BACTERIA SPEC CULT: NORMAL
GRAM STN SPEC: NORMAL
GRAM STN SPEC: NORMAL
Lab: NORMAL

## 2024-03-15 NOTE — TELEPHONE ENCOUNTER
Patient is no longer at the Nursing Home but still needs IV antibiotics.  She wants to know how to get those sent to her home.  Can you arrange that for her?

## 2024-03-15 NOTE — TELEPHONE ENCOUNTER
Pt doesn't have a home health agency to change wound bandage, but needs orders for [facility] since she is a prior pt & for therapy services

## 2024-03-18 ENCOUNTER — TELEPHONE (OUTPATIENT)
Age: 54
End: 2024-03-18

## 2024-03-18 NOTE — TELEPHONE ENCOUNTER
Sohan from [facility] called because they received orders to start IV antibiotics but needs to know when to start. His call back is [phone].

## 2024-03-19 LAB
BACTERIA SPEC CULT: ABNORMAL
BACTERIA SPEC CULT: NORMAL
GRAM STN SPEC: ABNORMAL
GRAM STN SPEC: ABNORMAL
GRAM STN SPEC: NORMAL
Lab: ABNORMAL
Lab: NORMAL

## 2024-03-20 PROBLEM — M79.605 ACUTE LEG PAIN, LEFT: Status: ACTIVE | Noted: 2024-03-06

## 2024-03-25 ENCOUNTER — HOSPITAL ENCOUNTER (OUTPATIENT)
Facility: HOSPITAL | Age: 54
Discharge: HOME | End: 2024-03-25
Attending: SPECIALIST
Payer: MEDICAID

## 2024-03-25 VITALS
DIASTOLIC BLOOD PRESSURE: 78 MMHG | RESPIRATION RATE: 18 BRPM | TEMPERATURE: 98.9 F | SYSTOLIC BLOOD PRESSURE: 128 MMHG | HEART RATE: 100 BPM

## 2024-03-25 DIAGNOSIS — S31.000A OPEN WOUND OF LOWER BACK: ICD-10-CM

## 2024-03-25 DIAGNOSIS — L98.423 NON-PRESSURE CHRONIC ULCER OF BACK WITH NECROSIS OF MUSCLE (HCC): Primary | ICD-10-CM

## 2024-03-25 DIAGNOSIS — T81.89XA NONHEALING SURGICAL WOUND, INITIAL ENCOUNTER: ICD-10-CM

## 2024-03-25 PROCEDURE — 11043 DBRDMT MUSC&/FSCA 1ST 20/<: CPT

## 2024-03-25 RX ORDER — IBUPROFEN 200 MG
TABLET ORAL ONCE
OUTPATIENT
Start: 2024-03-25 | End: 2024-03-25

## 2024-03-25 RX ORDER — GINSENG 100 MG
CAPSULE ORAL ONCE
OUTPATIENT
Start: 2024-03-25 | End: 2024-03-25

## 2024-03-25 RX ORDER — GENTAMICIN SULFATE 1 MG/G
OINTMENT TOPICAL ONCE
OUTPATIENT
Start: 2024-03-25 | End: 2024-03-25

## 2024-03-25 RX ORDER — BACITRACIN ZINC AND POLYMYXIN B SULFATE 500; 1000 [USP'U]/G; [USP'U]/G
OINTMENT TOPICAL ONCE
OUTPATIENT
Start: 2024-03-25 | End: 2024-03-25

## 2024-03-25 RX ORDER — CLOBETASOL PROPIONATE 0.5 MG/G
OINTMENT TOPICAL ONCE
OUTPATIENT
Start: 2024-03-25 | End: 2024-03-25

## 2024-03-25 RX ORDER — SODIUM CHLOR/HYPOCHLOROUS ACID 0.033 %
SOLUTION, IRRIGATION IRRIGATION ONCE
OUTPATIENT
Start: 2024-03-25 | End: 2024-03-25

## 2024-03-25 RX ORDER — TRIAMCINOLONE ACETONIDE 1 MG/G
OINTMENT TOPICAL ONCE
OUTPATIENT
Start: 2024-03-25 | End: 2024-03-25

## 2024-03-25 RX ORDER — BETAMETHASONE DIPROPIONATE 0.5 MG/G
CREAM TOPICAL ONCE
OUTPATIENT
Start: 2024-03-25 | End: 2024-03-25

## 2024-03-25 ASSESSMENT — PAIN SCALES - GENERAL: PAINLEVEL_OUTOF10: 4

## 2024-03-25 ASSESSMENT — PAIN DESCRIPTION - LOCATION: LOCATION: BACK

## 2024-03-25 NOTE — PROGRESS NOTES
Apply topically as needed for Pain Apply topically as needed.      albuterol sulfate HFA (PROVENTIL;VENTOLIN;PROAIR) 108 (90 Base) MCG/ACT inhaler INHALE TWO PUFFS BY MOUTH every 4 hours AS NEEDED      XANAX 0.5 MG tablet Take 1 tablet by mouth nightly as needed.      docusate (COLACE, DULCOLAX) 100 MG CAPS Take 100 mg by mouth daily      DULoxetine (CYMBALTA) 60 MG extended release capsule Take 1 capsule by mouth 2 times daily      oxyCODONE (ROXICODONE) 5 MG immediate release tablet Take 1 tablet by mouth every 4 hours as needed for Pain.      baclofen (LIORESAL) 10 MG tablet Take 5 tablets by mouth 2 times daily (Patient not taking: Reported on 2/15/2024)      levothyroxine (SYNTHROID) 100 MCG tablet Take 1 tablet by mouth Daily       No current facility-administered medications on file prior to encounter.       Immunization History:   Immunization History   Administered Date(s) Administered    Tetanus Toxoid, absorbed 01/01/2014        Complete      Review of Systems:     Constitutional:  no fever,  no chills,  no sweats, No weakness, No fatigue, No decreased activity.  Eye: No recent visual problem, No icterus, No discharge, No double vision.  Ear/Nose/Mouth/Throat: No decreased hearing, No ear pain, No nasal congestion, No sore throat.  Respiratory: No shortness of breath, No cough, No sputum production, No hemoptysis, No wheezing, No cyanosis.  Cardiovascular: No chest pain, No palpitations, No bradycardia, No tachycardia, No peripheral edema, No syncope.  Gastrointestinal: No nausea,  No vomiting, No diarrhea, No constipation, No heartburn,  No abdominal pain.  Genitourinary: No dysuria, No hematuria, No change in urine stream, No urethral discharge, No lesions.  Hematology/Lymphatics: No bruising tendency, No bleeding tendency, No petechiae, No swollen lymph glands.  Endocrine: No excessive thirst, No polyuria, No cold intolerance, No heat intolerance, No excessive hunger.  Immunologic: Not

## 2024-03-25 NOTE — DISCHARGE INSTRUCTIONS
Wound Clinic Physician Orders and Discharge Instructions  [facility]  [address], [address]  [place] [address]  Telephone: [phone]     FAX [phone]    NAME:  Paula Renteria  YOB: 1970  MEDICAL RECORD NUMBER:  [id number]  DATE:  3/25/2024      Return Appointment:  [] Dressing Supply Provider:   [x] Home Healthcare: [facility] (Hip managed by Ortho)   [x] Return Appointment: 2 Week(s)  [] Nurse Visit:     [] Discharge from [facility]: [] Healed        [] Consult    Follow-up Information:  [] Ordered Tests:   [x] Referrals: Ortho for hip  [] Other:       Wound Cleansing:   Do not scrub or use excessive force.  Cleanse wound prior to applying a clean dressing with:  [x] Normal Saline   [] Keep Wound Dry in Shower     [] Wound Cleanser   [] Cleanse wound with Mild Soap & Water    [] Other:       Topical Treatments:  Do not apply lotions, creams, or ointments to wound bed unless directed.   [] Apply moisturizing lotion to skin surrounding the wound prior to dressing change.  [] Apply antifungal ointment to skin surrounding the wound prior to dressing change.  [] Apply thin film of moisture barrier ointment to skin immediately around wound.  [] Apply Betadine to skin immediately around wound   [] Other:      Dressings:           Wound Location Back    [x] Apply Primary Dressing:       [] MediHoney Gel [] MediHoney Alginate  [] Calcium Alginate with Silver   [] Calcium Alginate without silver   [] Collagen with silver [] Collagen without Silver    [] Santyl with moist saline gauze     [x] Hydrofera Blue packing in clinic (wound vac at home): (cut to size and moistened with normal saline)  [] Hydrofera Blue Ready (cut to size)      [] Normal Saline wet to dry  [] Betadine wet to dry    [] Hydrogel  [] Mepitel     [] Bactroban/Mupirocin   [] Iodoform Packing Strip [] Plain Packing Strip   [] Skin Sub:   [] Other:      [x] Cover and Secure with:     [x] Gauze []

## 2024-03-25 NOTE — WOUND CARE
Wound Clinic Physician Orders and Discharge Instructions  [facility]  [address]  [place] [address]  Telephone: [phone]     FAX [phone]    NAME:  Paula Renteria  YOB: 1970  MEDICAL RECORD NUMBER:  [id number]  DATE:  3/25/2024      Return Appointment:  [] Dressing Supply Provider:   [x] Home Healthcare: Initiate HH for skilled nursing wound vac care to back. (Hip managed by Ortho Dr. Ramirez, PICC line managed by Dr. Garrison/[facility]). OK to start patient 3/31/24 after surgical procedure  [x] Return Appointment: 2 Week(s)  [] Nurse Visit:     [] Discharge from [facility]: [] Healed        [] Consult    Follow-up Information:  [] Ordered Tests:   [x] Referrals: Ortho for hip  [] Other:       Wound Cleansing:   Do not scrub or use excessive force.  Cleanse wound prior to applying a clean dressing with:  [x] Normal Saline   [] Keep Wound Dry in Shower     [] Wound Cleanser   [] Cleanse wound with Mild Soap & Water    [] Other:       Topical Treatments:  Do not apply lotions, creams, or ointments to wound bed unless directed.   [] Apply moisturizing lotion to skin surrounding the wound prior to dressing change.  [] Apply antifungal ointment to skin surrounding the wound prior to dressing change.  [] Apply thin film of moisture barrier ointment to skin immediately around wound.  [] Apply Betadine to skin immediately around wound   [] Other:      Dressings:           Wound Location Back    [x] Apply Primary Dressing:       [] MediHoney Gel [] MediHoney Alginate  [] Calcium Alginate with Silver   [] Calcium Alginate without silver   [] Collagen with silver [] Collagen without Silver    [] Santyl with moist saline gauze     [x] Hydrofera Blue packing in clinic (wound vac at home): (cut to size and moistened with normal saline)  [] Hydrofera Blue Ready (cut to size)      [] Normal Saline wet to dry  [] Betadine wet to dry    [] Hydrogel  [] Mepitel

## 2024-03-26 NOTE — WOUND CARE
Spoke with patient to clarify some questions for [facility]. Patient stated she does have a PICC line that is being maintained weekly by [facility] and she is seeing Dr. Garrison. Dr. Ramirez is following her for her hip wound/incision. She is seeing him weekly. Recommended she also speak with Akshat to find an in service network as Theodora is seeing her as a chandler case. She stated she does have a wound vac with supplies at home for her back wound. Message left with Kelly at [facility] to call back to discuss updates.    Spoke with Kelly for [facility], she stated she would check with her manager again to inform her that they would only need to provide vac care, but as of now they will not be accepting the patient back for HH services.      Writer faxed a HH referral to [facility], they stated they do not service the patients area.

## 2024-03-28 NOTE — WOUND CARE
Referral for home health faxed to:    3/27/24: [facility] - no response as of 3/28/24  3/27/24: [facility]- did not accept patent  3/28/24: [facility] - can accept patient for services. Writer called patient to notify of this and she stated that she thinks Dr. Araiza set her up with [facility] because a nurse came out today to start her for services but Theodora  was there as well and they were confused. Spoke with [facility] to explain that Theodora was only involved with patient as a chandler case and for them to please start services with patient. [facility] agreed to begin seeing patient after her surgical procedure tomorrow with Dr. Araiza. [facility] notified that services for HH not needed.

## 2024-04-08 ENCOUNTER — TELEPHONE (OUTPATIENT)
Age: 54
End: 2024-04-08

## 2024-04-08 NOTE — TELEPHONE ENCOUNTER
Winsome (RN) from [facility] called because they just started her care and need orders for picc .

## 2024-04-15 ENCOUNTER — HOSPITAL ENCOUNTER (OUTPATIENT)
Facility: HOSPITAL | Age: 54
Discharge: HOME | End: 2024-04-15
Attending: SPECIALIST
Payer: MEDICAID

## 2024-04-15 VITALS
RESPIRATION RATE: 18 BRPM | HEART RATE: 92 BPM | SYSTOLIC BLOOD PRESSURE: 139 MMHG | TEMPERATURE: 98.6 F | DIASTOLIC BLOOD PRESSURE: 75 MMHG

## 2024-04-15 DIAGNOSIS — S31.000A OPEN WOUND OF LOWER BACK: ICD-10-CM

## 2024-04-15 DIAGNOSIS — L98.423 NON-PRESSURE CHRONIC ULCER OF BACK WITH NECROSIS OF MUSCLE (HCC): Primary | ICD-10-CM

## 2024-04-15 DIAGNOSIS — T81.89XA NONHEALING SURGICAL WOUND, INITIAL ENCOUNTER: ICD-10-CM

## 2024-04-15 PROCEDURE — 99213 OFFICE O/P EST LOW 20 MIN: CPT

## 2024-04-15 RX ORDER — TRIAMCINOLONE ACETONIDE 1 MG/G
OINTMENT TOPICAL ONCE
OUTPATIENT
Start: 2024-04-15 | End: 2024-04-15

## 2024-04-15 RX ORDER — GINSENG 100 MG
CAPSULE ORAL ONCE
OUTPATIENT
Start: 2024-04-15 | End: 2024-04-15

## 2024-04-15 RX ORDER — SODIUM CHLOR/HYPOCHLOROUS ACID 0.033 %
SOLUTION, IRRIGATION IRRIGATION ONCE
OUTPATIENT
Start: 2024-04-15 | End: 2024-04-15

## 2024-04-15 RX ORDER — GENTAMICIN SULFATE 1 MG/G
OINTMENT TOPICAL ONCE
OUTPATIENT
Start: 2024-04-15 | End: 2024-04-15

## 2024-04-15 RX ORDER — BACITRACIN ZINC AND POLYMYXIN B SULFATE 500; 1000 [USP'U]/G; [USP'U]/G
OINTMENT TOPICAL ONCE
OUTPATIENT
Start: 2024-04-15 | End: 2024-04-15

## 2024-04-15 RX ORDER — BETAMETHASONE DIPROPIONATE 0.5 MG/G
CREAM TOPICAL ONCE
OUTPATIENT
Start: 2024-04-15 | End: 2024-04-15

## 2024-04-15 RX ORDER — CLOBETASOL PROPIONATE 0.5 MG/G
OINTMENT TOPICAL ONCE
OUTPATIENT
Start: 2024-04-15 | End: 2024-04-15

## 2024-04-15 RX ORDER — IBUPROFEN 200 MG
TABLET ORAL ONCE
OUTPATIENT
Start: 2024-04-15 | End: 2024-04-15

## 2024-04-15 NOTE — DISCHARGE INSTRUCTIONS
Wound Clinic Physician Orders and Discharge Instructions  [facility]  [address], [address]  [place] [address]  Telephone: [phone]     FAX [phone]    NAME:  Paula Renteria  YOB: 1970  MEDICAL RECORD NUMBER:  [id number]  DATE:  4/15/2024      Return Appointment:  [] Dressing Supply Provider:   [x] Home Healthcare: Care [facility] (The wound vac patient has on her hip was ordered for her back and needs to be applied to her back wound. If Dr. Ramirez wants a wound vac for her hip, it needs to be ordered by his office)    [x] Return Appointment: 1 Week(s)  [] Nurse Visit:     [] Discharge from [facility]: [] Healed        [] Consult    Follow-up Information:  [] Ordered Tests:   [x] Referrals: Ortho for hip, ID   [] Other:       Wound Cleansing:   Do not scrub or use excessive force.  Cleanse wound prior to applying a clean dressing with:  [x] Normal Saline   [] Keep Wound Dry in Shower     [] Wound Cleanser   [] Cleanse wound with Mild Soap & Water    [] Other:       Topical Treatments:  Do not apply lotions, creams, or ointments to wound bed unless directed.   [] Apply moisturizing lotion to skin surrounding the wound prior to dressing change.  [] Apply antifungal ointment to skin surrounding the wound prior to dressing change.  [] Apply thin film of moisture barrier ointment to skin immediately around wound.  [] Apply Betadine to skin immediately around wound   [] Other:      Dressings:           Wound Location Back    [x] Apply Primary Dressing:       [] MediHoney Gel [] MediHoney Alginate  [] Calcium Alginate with Silver   [] Calcium Alginate without silver   [] Collagen with silver [] Collagen without Silver    [] Santyl with moist saline gauze     [x] Hydrofera Blue packing in clinic (wound vac at home): (cut to size and moistened with normal saline)  [] Hydrofera Blue Ready (cut to size)      [] Normal Saline wet to dry  [] Betadine wet to dry    []

## 2024-04-15 NOTE — WOUND CARE
Wound Clinic Physician Orders and Discharge Instructions  [facility]  [address], [address]  [place] [address]  Telephone: [phone]     FAX [phone]    NAME:  Paula Renteria  YOB: 1970  MEDICAL RECORD NUMBER:  [id number]  DATE:  4/15/2024      Return Appointment:  [] Dressing Supply Provider:   [x] Home Healthcare: Care [facility] (The wound vac patient has on her hip was ordered for her back and needs to be applied to her back wound. If Dr. Ramirez wants a wound vac for her hip, it needs to be ordered by his office)    [x] Return Appointment: 1 Week(s)  [] Nurse Visit:     [] Discharge from [facility]: [] Healed        [] Consult    Follow-up Information:  [] Ordered Tests:   [x] Referrals: Ortho for hip, ID   [] Other:       Wound Cleansing:   Do not scrub or use excessive force.  Cleanse wound prior to applying a clean dressing with:  [x] Normal Saline   [] Keep Wound Dry in Shower     [] Wound Cleanser   [] Cleanse wound with Mild Soap & Water    [] Other:       Topical Treatments:  Do not apply lotions, creams, or ointments to wound bed unless directed.   [] Apply moisturizing lotion to skin surrounding the wound prior to dressing change.  [] Apply antifungal ointment to skin surrounding the wound prior to dressing change.  [] Apply thin film of moisture barrier ointment to skin immediately around wound.  [] Apply Betadine to skin immediately around wound   [] Other:      Dressings:           Wound Location Back    [x] Apply Primary Dressing:       [] MediHoney Gel [] MediHoney Alginate  [] Calcium Alginate with Silver   [] Calcium Alginate without silver   [] Collagen with silver [] Collagen without Silver    [] Santyl with moist saline gauze     [x] Hydrofera Blue packing in clinic (wound vac at home): (cut to size and moistened with normal saline)  [] Hydrofera Blue Ready (cut to size)      [] Normal Saline wet to dry  [] Betadine wet to dry    []

## 2024-04-15 NOTE — PROGRESS NOTES
edges;Epibole (rolled edges) 04/15/24 1043   Wound Thickness Description not for Pressure Injury Full thickness 04/15/24 1043   Number of days: 83            Laboratory Values: No results found for this or any previous visit (from the past 24 hour(s)).    Micro Cultures:   Results       ** No results found for the last 336 hours. 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         Discharge Instructions              Wound Clinic Physician Orders and Discharge Instructions  [facility]  [facility] [address], [address] [place] [address]  Telephone: [phone]     FAX [phone]    NAME:  Paula Renteria  YOB: 1970  MEDICAL RECORD NUMBER:  [id number]  DATE:  4/15/2024      Return Appointment:  [] Dressing Supply Provider:   [x] Home Healthcare: Care [facility] (The wound vac patient has on her hip was ordered for her back and needs to be applied to her back wound. If

## 2024-04-16 ENCOUNTER — OFFICE VISIT (OUTPATIENT)
Age: 54
End: 2024-04-16
Payer: MEDICARE

## 2024-04-16 VITALS
SYSTOLIC BLOOD PRESSURE: 124 MMHG | BODY MASS INDEX: 28.35 KG/M2 | TEMPERATURE: 98 F | HEIGHT: 62 IN | RESPIRATION RATE: 18 BRPM | DIASTOLIC BLOOD PRESSURE: 77 MMHG | HEART RATE: 78 BPM | OXYGEN SATURATION: 95 %

## 2024-04-16 DIAGNOSIS — M86.9 HIP OSTEOMYELITIS, LEFT (HCC): ICD-10-CM

## 2024-04-16 DIAGNOSIS — G89.29 CHRONIC LOW BACK PAIN, UNSPECIFIED BACK PAIN LATERALITY, UNSPECIFIED WHETHER SCIATICA PRESENT: ICD-10-CM

## 2024-04-16 DIAGNOSIS — M54.50 CHRONIC LOW BACK PAIN, UNSPECIFIED BACK PAIN LATERALITY, UNSPECIFIED WHETHER SCIATICA PRESENT: ICD-10-CM

## 2024-04-16 DIAGNOSIS — M00.9 PYOGENIC ARTHRITIS OF LEFT HIP, DUE TO UNSPECIFIED ORGANISM (HCC): Primary | ICD-10-CM

## 2024-04-16 DIAGNOSIS — M46.26 OSTEOMYELITIS OF LUMBAR SPINE (HCC): ICD-10-CM

## 2024-04-16 PROCEDURE — 99214 OFFICE O/P EST MOD 30 MIN: CPT | Performed by: INTERNAL MEDICINE

## 2024-04-16 ASSESSMENT — ENCOUNTER SYMPTOMS
RESPIRATORY NEGATIVE: 1
GASTROINTESTINAL NEGATIVE: 1
BACK PAIN: 1

## 2024-04-16 NOTE — PROGRESS NOTES
Chief Complaint   Patient presents with    Follow-Up from Hospital    Wound Infection    Osteomyelitis      BP (!) 143/82 (Site: Left Upper Arm, Position: Sitting, Cuff Size: Medium Adult)   Pulse 78   Temp 98 °F (36.7 °C) (Oral)   Resp 18   Ht 1.575 m (5' 2\")   SpO2 95%   BMI 28.35 kg/m²     BP (!) 143/82 (Site: Left Upper Arm, Position: Sitting, Cuff Size: Medium Adult)   Pulse 78   Temp 98 °F (36.7 °C) (Oral)   Resp 18   Ht 1.575 m (5' 2\")   SpO2 95%   BMI 28.35 kg/m²

## 2024-04-16 NOTE — PROGRESS NOTES
HPI:  53 y.o WF presenting for a routine f/u left hip Septic arthritis/Chronic osteomyelitis. She underwent left hip debridement on 03/04, and on 03/08 by Dr Ramirez. Rare Cutibacterium (Propionibacterium) Acne, was isolated from 1/6 intra-op Cxs. Wound Cx from 03/08/24 was neg. She was discharged on an 8-wk course of Cefepime and vancomycin. 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 aeruginosa from wound Cxs, completed multiple courses of antibiotics targeting Pseudomonas, managed by ID specialist at [facility],  in the past. I was consulted for left hip infection by orthopedics.  She was admitted to [facility] a couple wks ago, during which she underwent left hip debridement x 2.  Yeast was isolated from intraoperative Cx. She was continued on cefepime and Vanc at discharge, micafungin was added pending susceptibility of yeast. Yeast was identified as C.albicans after her d/c home. She missed a few doses of vancomycin due to delayed blood draw, trough was 8.5 on 04/15. Most recent labs on 04/15 showed a CRP 33 up from 12.0 on 03/28 ESR 54 down from 03/28. She reports decreased left hip pain since her last debridement but is unable to ambulate on ext. She has an up coming apt w [facility] ortho for eval of her spine. She denies acute complaints on assessment today.     ROS  Review of Systems   Constitutional: Negative.    HENT: Negative.     Respiratory: Negative.     Cardiovascular: Negative.    Gastrointestinal: Negative.    Musculoskeletal:  Positive for back pain and joint swelling.   Skin: Negative.    Neurological:  Positive for weakness.        Ambulatory difficulty     Past Medical History:   Diagnosis Date    Thyroid disease         Past Surgical History:   Procedure Laterality Date    BACK SURGERY      CHOLECYSTECTOMY      COLON SURGERY      HIP SURGERY Left 3/4/2024    DEBRIDEMENT LEFT HIP, GIRDLESTONE RESECTION LEFT HIP,

## 2024-04-17 NOTE — WOUND CARE
[facility]  [facility] for orders for patient. Informed them that the wound vac patient has currently in her home was ordered for her back wound and should only be applied to her back wound. The home health nurses had applied the wound vac to her hip wound because they stated they did not have any orders for the wound vac to be placed to her back despite writer faxing the previous orders to them 3/28/24. Britney confirmed they had received our orders that were faxed this week from her visit and stated they would apply the wound vac to her back as ordered and contact Dr. Ramirez for a wound vac order to her hip and inform him it would need to be ordered as Dr. Lopez does not want the 2 wounds connected on 1 vac due to her multiple infections. Reminded them that Dr. Ramirez is treating her hip wound, Dr. Garrison manages her IV, and Dr. Lopez is treating her back wound. Understanding verbalized.

## 2024-04-19 ENCOUNTER — TELEPHONE (OUTPATIENT)
Age: 54
End: 2024-04-19

## 2024-04-19 NOTE — TELEPHONE ENCOUNTER
Winsome (Nurse) - [facility] called because Paula Renteria has an order for a picc line but its not easy to push the saline in and no blood return. Wants to know if you can order [employer]te[employer].

## 2024-04-20 NOTE — PROGRESS NOTES
Physician Progress Note      PATIENT:               PATRICIA AMOS  CSN #:                  [id number]  :                       1970  ADMIT DATE:       2024 1:47 PM  DISCH DATE:        3/13/2024 7:45 PM  RESPONDING  PROVIDER #:        Bolivar Ruiz PA-C          QUERY TEXT:    Pt admitted with septic arthritis of the left hip requiring surgical   intervention. Pt noted to have chronic infection of spinal hardware.  24   CT scan notes \". Loosening of the hardware in the L3 L4 L5 and left pelvis\".   If possible, please document in progress notes and discharge summary the   relationship, if any, between chronic hardware infection and loosening of   spinal hardware.    The medical record reflects the following:  Risk Factors:  -53 F admitted on 24 with left hip septic arthritis    Clinical Indicators:  -24 CT scan notes \". Loosening of the hardware in the L3 L4 L5 and left   pelvis\"    -Ortho consult note dated 24 notes, \"chronic infection of the spinal   hardware, as well as likely septic arthritis of the left hip joint; likely   suffering from local or hematogenous spread of infection to her left hip   joint, with chronic pyogenic septic arthritis; She underwent a lumbar spine   fusion almost 12 years ago which was followed by a postoperative wound   infection.  She had a prolonged treatment of this problem, including surgeries   at [facility].  She finally came under the treatment of Dr. Newsome who was with   [facility] at that time.  Her infection reportedly became controlled.  She   apparently had removal of all hardware in .  Due to progressive symptoms   and deformity, she subsequently underwent revision thoracolumbosacral iliac   posterior fusion with instrumentation by Dr. Pulido in .  She again   developed postoperative deep infection with wound drainage that began in .   The patient is currently visiting the [facility] wound   clinic for an  no

## 2024-04-29 ENCOUNTER — HOSPITAL ENCOUNTER (OUTPATIENT)
Facility: HOSPITAL | Age: 54
Discharge: HOME | End: 2024-04-29
Attending: SPECIALIST
Payer: MEDICAID

## 2024-04-29 VITALS
SYSTOLIC BLOOD PRESSURE: 142 MMHG | RESPIRATION RATE: 18 BRPM | DIASTOLIC BLOOD PRESSURE: 74 MMHG | HEART RATE: 72 BPM | TEMPERATURE: 97.9 F

## 2024-04-29 DIAGNOSIS — S31.000A OPEN WOUND OF LOWER BACK: ICD-10-CM

## 2024-04-29 DIAGNOSIS — L98.423 NON-PRESSURE CHRONIC ULCER OF BACK WITH NECROSIS OF MUSCLE (HCC): Primary | ICD-10-CM

## 2024-04-29 DIAGNOSIS — T81.89XD NONHEALING SURGICAL WOUND, SUBSEQUENT ENCOUNTER: ICD-10-CM

## 2024-04-29 DIAGNOSIS — T81.89XA NONHEALING SURGICAL WOUND, INITIAL ENCOUNTER: ICD-10-CM

## 2024-04-29 PROCEDURE — 11043 DBRDMT MUSC&/FSCA 1ST 20/<: CPT

## 2024-04-29 PROCEDURE — 97605 NEG PRS WND THER DME<=50SQCM: CPT

## 2024-04-29 RX ORDER — GINSENG 100 MG
CAPSULE ORAL ONCE
OUTPATIENT
Start: 2024-04-29 | End: 2024-04-29

## 2024-04-29 RX ORDER — SODIUM CHLOR/HYPOCHLOROUS ACID 0.033 %
SOLUTION, IRRIGATION IRRIGATION ONCE
OUTPATIENT
Start: 2024-04-29 | End: 2024-04-29

## 2024-04-29 RX ORDER — TRIAMCINOLONE ACETONIDE 1 MG/G
OINTMENT TOPICAL ONCE
OUTPATIENT
Start: 2024-04-29 | End: 2024-04-29

## 2024-04-29 RX ORDER — BETAMETHASONE DIPROPIONATE 0.5 MG/G
CREAM TOPICAL ONCE
OUTPATIENT
Start: 2024-04-29 | End: 2024-04-29

## 2024-04-29 RX ORDER — GENTAMICIN SULFATE 1 MG/G
OINTMENT TOPICAL ONCE
OUTPATIENT
Start: 2024-04-29 | End: 2024-04-29

## 2024-04-29 RX ORDER — CLOBETASOL PROPIONATE 0.5 MG/G
OINTMENT TOPICAL ONCE
OUTPATIENT
Start: 2024-04-29 | End: 2024-04-29

## 2024-04-29 RX ORDER — IBUPROFEN 200 MG
TABLET ORAL ONCE
OUTPATIENT
Start: 2024-04-29 | End: 2024-04-29

## 2024-04-29 RX ORDER — BACITRACIN ZINC AND POLYMYXIN B SULFATE 500; 1000 [USP'U]/G; [USP'U]/G
OINTMENT TOPICAL ONCE
OUTPATIENT
Start: 2024-04-29 | End: 2024-04-29

## 2024-04-29 ASSESSMENT — PAIN SCALES - GENERAL: PAINLEVEL_OUTOF10: 4

## 2024-04-29 NOTE — DISCHARGE INSTRUCTIONS
Wound Clinic Physician Orders and Discharge Instructions  [facility]  [address], [address]  [place] [address]  Telephone: [phone]     FAX [phone]    NAME:  Paula Renteria  YOB: 1970  MEDICAL RECORD NUMBER:  [id number]  DATE:  4/29/2024      Return Appointment:  [] Dressing Supply Provider:   [x] Home Healthcare: [facility]   [x] Return Appointment: 2 Week(s)  [] Nurse Visit:     [] Discharge from [facility]: [] Healed        [] Consult    Follow-up Information:  [] Ordered Tests:   [x] Referrals: Ortho for hip, ID   [] Other:       Wound Cleansing:   Do not scrub or use excessive force.  Cleanse wound prior to applying a clean dressing with:  [x] Normal Saline   [] Keep Wound Dry in Shower     [] Wound Cleanser   [] Cleanse wound with Mild Soap & Water    [] Other:       Topical Treatments:  Do not apply lotions, creams, or ointments to wound bed unless directed.   [] Apply moisturizing lotion to skin surrounding the wound prior to dressing change.  [] Apply antifungal ointment to skin surrounding the wound prior to dressing change.  [] Apply thin film of moisture barrier ointment to skin immediately around wound.  [] Apply Betadine to skin immediately around wound   [] Other:      Dressings:           Wound Location Back    [x] Apply Primary Dressing:       [] MediHoney Gel [] MediHoney Alginate  [] Calcium Alginate with Silver   [] Calcium Alginate without silver   [] Collagen with silver [] Collagen without Silver    [] Santyl with moist saline gauze     [] Hydrofera Blue: (cut to size and moistened with normal saline)  [] Hydrofera Blue Ready (cut to size)      [x] Normal Saline wet to dry if vac malfunctions [] Betadine wet to dry    [] Hydrogel  [] Mepitel     [] Bactroban/Mupirocin   [] Iodoform Packing Strip [] Plain Packing Strip   [] Skin Sub:   [] Other:      [] Cover and Secure with:     [] Gauze [] Loyda [] Kerlix   [] Foam border [] Super

## 2024-04-29 NOTE — OP NOTE
Procedure Note  Indications: Based on my examination of this patient's wound(s)/ulcer(s) today, debridement is required to promote healing and evaluate the wound base.    Debridement: Excisional Debridement    Using: curette the wound(s)/ulcer(s) was/were debrided down through and including the removal of epidermis, dermis, subcutaneous tissue, and muscle/fascia.  Performed by: Spencer Lopez MD  Consent obtained: Yes  Time out taken: Yes  Pain Control: Anesthetic  Anesthetic: 4% Lidocaine Liquid Topical       Devitalized Tissue Debrided: fibrin, biofilm, and slough    Pre Debridement Measurements:  Are located in the Wound/Ulcer Documentation Flow Sheet    Diabetic/Pressure/Non Pressure Ulcers only:  Ulcer: Other: Nonhealing postsurgical lower back open wound.      Wound/Ulcer #: 1  Post Debridement Measurements:  Wound/Ulcer Descriptions are Pre Debridement except measurements:  Negative Pressure Wound Therapy Back Lower (Active)   $ Standard NPWT <=50 sq cm PER TX $ Yes 04/29/24 1124   Wound Type Surgical 04/29/24 1124   Unit Type KCI 04/29/24 1124   Dressing Type White Foam;Black Foam 04/29/24 1124   Number of pieces used 2 04/29/24 1124   Number of pieces removed 2 04/29/24 1124   Cycle Continuous 04/29/24 1124   Target Pressure (mmHg) 125 04/29/24 1124   Canister changed? Yes 04/29/24 1124   Dressing Status New dressing applied 04/29/24 1124   Dressing Changed Changed/New 04/29/24 1124   Number of days: 0       Wound 01/23/24 Back Lower #1 (Active)   Wound Image   04/29/24 1021   Wound Etiology Non-Healing Surgical 04/29/24 1021   Dressing Status Clean;Dry;Intact 04/29/24 1021   Wound Cleansed Cleansed with saline 04/29/24 1021   Dressing/Treatment Hydrofera blue;Dry dressing 04/15/24 1140   Wound Length (cm) 3 cm 04/29/24 1021   Wound Width (cm) 1 cm 04/29/24 1021   Wound Depth (cm) 2.5 cm 04/29/24 1021   Wound Surface Area (cm^2) 3 cm^2 04/29/24 1021   Change in Wound Size % (l*w) 6.25 04/29/24 1021

## 2024-04-29 NOTE — WOUND CARE
Wound Clinic Physician Orders and Discharge Instructions  [facility]  [address], [address]  [place] [address]  Telephone: [phone]     FAX [phone]    NAME:  Paula Renteria  YOB: 1970  MEDICAL RECORD NUMBER:  [id number]  DATE:  4/29/2024      Return Appointment:  [] Dressing Supply Provider:   [x] Home Healthcare: [facility]   [x] Return Appointment: 2 Week(s)  [] Nurse Visit:     [] Discharge from [facility]: [] Healed        [] Consult    Follow-up Information:  [] Ordered Tests:   [x] Referrals: Ortho for hip, ID   [] Other:       Wound Cleansing:   Do not scrub or use excessive force.  Cleanse wound prior to applying a clean dressing with:  [x] Normal Saline   [] Keep Wound Dry in Shower     [] Wound Cleanser   [] Cleanse wound with Mild Soap & Water    [] Other:       Topical Treatments:  Do not apply lotions, creams, or ointments to wound bed unless directed.   [] Apply moisturizing lotion to skin surrounding the wound prior to dressing change.  [] Apply antifungal ointment to skin surrounding the wound prior to dressing change.  [] Apply thin film of moisture barrier ointment to skin immediately around wound.  [] Apply Betadine to skin immediately around wound   [] Other:      Dressings:           Wound Location Back    [x] Apply Primary Dressing:       [] MediHoney Gel [] MediHoney Alginate  [] Calcium Alginate with Silver   [] Calcium Alginate without silver   [] Collagen with silver [] Collagen without Silver    [] Santyl with moist saline gauze     [] Hydrofera Blue: (cut to size and moistened with normal saline)  [] Hydrofera Blue Ready (cut to size)      [x] Normal Saline wet to dry if vac malfunctions [] Betadine wet to dry    [] Hydrogel  [] Mepitel     [] Bactroban/Mupirocin   [] Iodoform Packing Strip [] Plain Packing Strip   [] Skin Sub:   [] Other:      [] Cover and Secure with:     [] Gauze [] Loyda [] Kerlix   [] Foam border []

## 2024-04-29 NOTE — PROGRESS NOTES
[facility]   Wound Care and Hyperbaric Oxygen Therapy Center   Medical Staff Note     Paula Renteria  MEDICAL RECORD NUMBER:  [id number]  AGE: 53 y.o.   GENDER: female  : 1970  EPISODE DATE:  2024      Chief Complaint:   Chief Complaint   Patient presents with    Wound Check     back       History of Present Illness:    Paula Renteria is a 53 y.o. female who presents today for wound/ulcer evaluation of lower back following lower back surgery by Dr. Pulido.     History of Wound Context: Per original history and physical on this patient. Changes in history since last evaluation: Previous cultures in March were positive for Pseudomonas resistant to multiple antibiotics. She is currently on IV antibiotics. She has been followed by Dr. Miramontes from infectious disease. Patient reports feeling better today with less fatigue than one year ago.     She recently underwent a left hip I&D by Dr. Ramirez from [facility] orthopedics.  She has been using 1 wound VAC with bridging of the wound between the sacrum and the left hip.  However the orthopedic surgeon feels uncomfortable having 1 wound VAC with bridging between 2 wounds.  He recommends 2 separate wound vacs for 2 separate wounds to avoid any cross-contamination of the wounds.     Wound: Lower back nonhealing surgical open wound.    Past Medical History:       Diagnosis Date    Thyroid disease        Past Surgical History:   Past Surgical History:   Procedure Laterality Date    BACK SURGERY      CHOLECYSTECTOMY      COLON SURGERY      HIP SURGERY Left 3/4/2024    DEBRIDEMENT LEFT HIP, GIRDLESTONE RESECTION LEFT HIP, ANTIBIOTIC CEMENT SPACER PLACEMENT LEFT HIP, ADDUCTOR TENOTOMY LEFT HIP performed by Haris Ramirez MD at [facility] MAIN OR    JOINT REPLACEMENT      LEG SURGERY Left 3/8/2024    EXCISIONAL DEBRIDEMENT OF LEFT HIP WOUND performed by Haris Ramirez MD at [facility] MAIN OR    REVISION TOTAL KNEE ARTHROPLASTY Right

## 2024-04-29 NOTE — WOUND CARE
Negative Pressure Wound Therapy    NAME:  Paula Renteria  YOB: 1970  MEDICAL RECORD NUMBER:  [id number]  DATE:  4/29/2024    Applied Negative Pressure to back wound(s)/ulcer(s).  [x] Applied skin barrier prep to janusz-wound.   [x] Cut strips of plastic drape to picture frame wound so that janusz-wound is     covered with the drape.   [x] If bridging dressing to less prominent site, cover any intact skin that will come in contact with the Negative Pressure Therapy sponge, gauze or channel drain with plastic drape. The sponge should never touch intact skin.   [x] Cut sponge, gauze or channel drain to size which will fit into the wound/ulcer bed without being forced.   [x] Be sure the sponge is large enough to hold the entire round plastic flange which is attached to the tubing. Never allow flange to be larger than the sponge or it will produce suction damaging intact skin.  Total number of individual pieces of foam used within the wound bed: 2    [x] If bridging the dressing away from the primary site, be sure the bridge leads to a piece of sponge large enough to hold the entire flange without allowing any of the flange to overlap onto intact skin.   [x] Covered sponge, gauze or channel drain with plastic drape.   [x] Cut a hole in this plastic drape directly over the sponge the same size as the plastic drain tubing.   [x] Removed plastic liner from flange and apply it directly over the hole you cut.   [x] Removed the plastic cover from the flange.   [x] Attached the tubing to the wound/ulcer Negative Pressure Therapy and turn it on to be sure a vacuum is created and that there are no leaks.   [x] If air leaks occur, use plastic drape to patch them.   [x] Secured Negative Pressure Therapy dressing with ace wrap loosely if located on an extremity. Maintain tubing outside of ace wrap. Tubing must not exert pressure on intact skin.    Applied per  Guidelines      Electronically signed by Crystal Hope

## 2024-05-07 ENCOUNTER — OFFICE VISIT (OUTPATIENT)
Age: 54
End: 2024-05-07
Payer: MEDICARE

## 2024-05-07 VITALS
WEIGHT: 155 LBS | OXYGEN SATURATION: 93 % | DIASTOLIC BLOOD PRESSURE: 73 MMHG | HEIGHT: 62 IN | RESPIRATION RATE: 16 BRPM | TEMPERATURE: 98.3 F | SYSTOLIC BLOOD PRESSURE: 124 MMHG | HEART RATE: 79 BPM | BODY MASS INDEX: 28.52 KG/M2

## 2024-05-07 DIAGNOSIS — M86.9 HIP OSTEOMYELITIS, LEFT (HCC): Primary | ICD-10-CM

## 2024-05-07 DIAGNOSIS — M86.68 CHRONIC OSTEOMYELITIS OF LUMBAR SPINE (HCC): ICD-10-CM

## 2024-05-07 DIAGNOSIS — B37.2 YEAST INFECTION OF THE SKIN: ICD-10-CM

## 2024-05-07 DIAGNOSIS — M00.9 PYOGENIC ARTHRITIS OF LEFT HIP, DUE TO UNSPECIFIED ORGANISM (HCC): ICD-10-CM

## 2024-05-07 PROCEDURE — 99214 OFFICE O/P EST MOD 30 MIN: CPT | Performed by: INTERNAL MEDICINE

## 2024-05-07 RX ORDER — FLUCONAZOLE 100 MG/1
100 TABLET ORAL DAILY
Qty: 14 TABLET | Refills: 0 | Status: SHIPPED | OUTPATIENT
Start: 2024-05-07 | End: 2024-05-21

## 2024-05-07 ASSESSMENT — PATIENT HEALTH QUESTIONNAIRE - PHQ9
SUM OF ALL RESPONSES TO PHQ QUESTIONS 1-9: 0
SUM OF ALL RESPONSES TO PHQ QUESTIONS 1-9: 0
2. FEELING DOWN, DEPRESSED OR HOPELESS: NOT AT ALL
SUM OF ALL RESPONSES TO PHQ QUESTIONS 1-9: 0
SUM OF ALL RESPONSES TO PHQ QUESTIONS 1-9: 0
1. LITTLE INTEREST OR PLEASURE IN DOING THINGS: NOT AT ALL
SUM OF ALL RESPONSES TO PHQ9 QUESTIONS 1 & 2: 0

## 2024-05-07 ASSESSMENT — ENCOUNTER SYMPTOMS
GASTROINTESTINAL NEGATIVE: 1
ROS SKIN COMMENTS: RASH IN GROIN AREA
RESPIRATORY NEGATIVE: 1

## 2024-05-07 NOTE — PROGRESS NOTES
Chief Complaint   Patient presents with    Follow-up     /73   Pulse 79   Temp 98.3 °F (36.8 °C)   Resp 16   Ht 1.575 m (5' 2\")   Wt 70.3 kg (155 lb)   SpO2 93%   BMI 28.35 kg/m²   1. Have you been to the ER, urgent care clinic since your last visit?  Hospitalized since your last visit?No    2. Have you seen or consulted any other health care providers outside of the [facility] since your last visit?  Include any pap smears or colon screening. No

## 2024-05-07 NOTE — PROGRESS NOTES
HPI: 53 y.o presenting for a routine f/u of left hip Septic arthritis/Chronic osteomyelitis. She is s/p debridement of left hip and 3/2024 and 4/2024 w placement of antibiotics spacer by Dr. Ramirez. She is on a 6-wk course of empiric Vanc and cefepime, reports tolerating both. She has wound vacs to her left lateral hip and lower back.  I have not received blood work from home health since 04/15/24, will reach out to them for updated labs. She did f/u w spine surgeon at [facility], and CT spine has been ordered with plans for surgery later on. She presents today due to concerns of yeast infection involving her groin with associated pruritus.  She denies antibiotic associated diarrhea.  She reports improved left hip pain since surgery     ROS  Review of Systems   Constitutional: Negative.    Respiratory: Negative.     Cardiovascular: Negative.    Gastrointestinal: Negative.    Endocrine: Negative.    Genitourinary: Negative.    Musculoskeletal: Negative.    Skin:         Rash in groin area   Neurological: Negative.    Hematological: Negative.    Psychiatric/Behavioral: Negative.         Past Medical History:   Diagnosis Date    Thyroid disease         Past Surgical History:   Procedure Laterality Date    BACK SURGERY      CHOLECYSTECTOMY      COLON SURGERY      HIP SURGERY Left 3/4/2024    DEBRIDEMENT LEFT HIP, GIRDLESTONE RESECTION LEFT HIP, ANTIBIOTIC CEMENT SPACER PLACEMENT LEFT HIP, ADDUCTOR TENOTOMY LEFT HIP performed by Haris Ramirez MD at [facility] MAIN OR    JOINT REPLACEMENT      LEG SURGERY Left 3/8/2024    EXCISIONAL DEBRIDEMENT OF LEFT HIP WOUND performed by Haris Ramirez MD at [facility] MAIN OR    REVISION TOTAL KNEE ARTHROPLASTY Right         Social History     Tobacco Use    Smoking status: Every Day     Current packs/day: 0.50     Average packs/day: 0.5 packs/day for 40.0 years (20.0 ttl pk-yrs)     Types: Cigarettes    Smokeless tobacco: Never   Vaping Use    Vaping Use: Never used   Substance Use

## 2024-05-13 ENCOUNTER — HOSPITAL ENCOUNTER (OUTPATIENT)
Facility: HOSPITAL | Age: 54
Discharge: HOME | End: 2024-05-13
Attending: SPECIALIST
Payer: MEDICARE

## 2024-05-13 VITALS
TEMPERATURE: 98.2 F | SYSTOLIC BLOOD PRESSURE: 115 MMHG | HEART RATE: 96 BPM | RESPIRATION RATE: 18 BRPM | DIASTOLIC BLOOD PRESSURE: 71 MMHG

## 2024-05-13 DIAGNOSIS — S31.000A OPEN WOUND OF LOWER BACK: ICD-10-CM

## 2024-05-13 DIAGNOSIS — L98.423 NON-PRESSURE CHRONIC ULCER OF BACK WITH NECROSIS OF MUSCLE (HCC): Primary | ICD-10-CM

## 2024-05-13 DIAGNOSIS — T81.89XA NONHEALING SURGICAL WOUND, INITIAL ENCOUNTER: ICD-10-CM

## 2024-05-13 PROCEDURE — 11043 DBRDMT MUSC&/FSCA 1ST 20/<: CPT

## 2024-05-13 PROCEDURE — 97605 NEG PRS WND THER DME<=50SQCM: CPT

## 2024-05-13 RX ORDER — TRIAMCINOLONE ACETONIDE 1 MG/G
OINTMENT TOPICAL ONCE
OUTPATIENT
Start: 2024-05-13 | End: 2024-05-13

## 2024-05-13 RX ORDER — SODIUM CHLOR/HYPOCHLOROUS ACID 0.033 %
SOLUTION, IRRIGATION IRRIGATION ONCE
OUTPATIENT
Start: 2024-05-13 | End: 2024-05-13

## 2024-05-13 RX ORDER — GENTAMICIN SULFATE 1 MG/G
OINTMENT TOPICAL ONCE
OUTPATIENT
Start: 2024-05-13 | End: 2024-05-13

## 2024-05-13 RX ORDER — BACITRACIN ZINC AND POLYMYXIN B SULFATE 500; 1000 [USP'U]/G; [USP'U]/G
OINTMENT TOPICAL ONCE
OUTPATIENT
Start: 2024-05-13 | End: 2024-05-13

## 2024-05-13 RX ORDER — IBUPROFEN 200 MG
TABLET ORAL ONCE
OUTPATIENT
Start: 2024-05-13 | End: 2024-05-13

## 2024-05-13 RX ORDER — GINSENG 100 MG
CAPSULE ORAL ONCE
OUTPATIENT
Start: 2024-05-13 | End: 2024-05-13

## 2024-05-13 RX ORDER — CLOBETASOL PROPIONATE 0.5 MG/G
OINTMENT TOPICAL ONCE
OUTPATIENT
Start: 2024-05-13 | End: 2024-05-13

## 2024-05-13 RX ORDER — BETAMETHASONE DIPROPIONATE 0.5 MG/G
CREAM TOPICAL ONCE
OUTPATIENT
Start: 2024-05-13 | End: 2024-05-13

## 2024-05-13 NOTE — OP NOTE
Procedure Note  Indications: Based on my examination of this patient's wound(s)/ulcer(s) today, debridement is required to promote healing and evaluate the wound base.    Debridement: Excisional Debridement    Using: curette the wound(s)/ulcer(s) was/were debrided down through and including the removal of epidermis, dermis, subcutaneous tissue, and muscle/fascia.  Performed by: Spencer Lopez MD  Consent obtained: Yes  Time out taken: Yes  Pain Control:         Devitalized Tissue Debrided: fibrin, biofilm, and slough    Pre Debridement Measurements:  Are located in the Wound/Ulcer Documentation Flow Sheet    Diabetic/Pressure/Non Pressure Ulcers only:  Ulcer: Other: Lower back nonhealing surgical wound.    Wound/Ulcer #: 1  Post Debridement Measurements:  Wound/Ulcer Descriptions are Pre Debridement except measurements:  Negative Pressure Wound Therapy Back Lower (Active)   $ Standard NPWT <=50 sq cm PER TX $ Yes 05/13/24 1132   Wound Type Surgical 05/13/24 1132   Unit Type KCI 05/13/24 1132   Dressing Type White Foam;Black Foam 05/13/24 1132   Number of pieces used 2 05/13/24 1132   Number of pieces removed 2 04/29/24 1124   Cycle Continuous 05/13/24 1132   Target Pressure (mmHg) 125 05/13/24 1132   Canister changed? Yes 05/13/24 1132   Dressing Status New dressing applied 05/13/24 1132   Dressing Changed Changed/New 05/13/24 1132   Number of days: 14       Wound 01/23/24 Back Lower #1 (Active)   Wound Image   05/13/24 1057   Wound Etiology Non-Healing Surgical 05/13/24 1057   Dressing Status Clean;Dry;Intact 05/13/24 1057   Wound Cleansed Cleansed with saline 05/13/24 1057   Dressing/Treatment Negative pressure wound therapy 05/13/24 1132   Wound Length (cm) 2.1 cm 05/13/24 1057   Wound Width (cm) 1 cm 05/13/24 1057   Wound Depth (cm) 2 cm 05/13/24 1057   Wound Surface Area (cm^2) 2.1 cm^2 05/13/24 1057   Change in Wound Size % (l*w) 34.38 05/13/24 1057   Wound Volume (cm^3) 4.2 cm^3 05/13/24 1057   Wound

## 2024-05-13 NOTE — DISCHARGE INSTRUCTIONS
Wound Clinic Physician Orders and Discharge Instructions  [facility]  [address], [address]  [place] [address]  Telephone: [phone]     FAX [phone]    NAME:  Paula Renteria  YOB: 1970  MEDICAL RECORD NUMBER:  [id number]  DATE:  5/13/2024      Return Appointment:  [] Dressing Supply Provider:   [x] Home Healthcare: [facility]   [x] Return Appointment: 3 Week(s)  [] Nurse Visit:     [] Discharge from [facility]: [] Healed        [] Consult    Follow-up Information:  [] Ordered Tests:   [x] Referrals: Ortho for hip, ID   [] Other:     Wound Cleansing:   Do not scrub or use excessive force.  Cleanse wound prior to applying a clean dressing with:  [x] Normal Saline   [] Keep Wound Dry in Shower     [] Wound Cleanser   [] Cleanse wound with Mild Soap & Water    [] Other:       Topical Treatments:  Do not apply lotions, creams, or ointments to wound bed unless directed.   [] Apply moisturizing lotion to skin surrounding the wound prior to dressing change.  [] Apply antifungal ointment to skin surrounding the wound prior to dressing change.  [] Apply thin film of moisture barrier ointment to skin immediately around wound.  [] Apply Betadine to skin immediately around wound   [] Other:      Dressings:           Wound Location Back    [x] Apply Primary Dressing:       [] MediHoney Gel [] MediHoney Alginate  [] Calcium Alginate with Silver   [] Calcium Alginate without silver   [] Collagen with silver [] Collagen without Silver    [] Santyl with moist saline gauze     [] Hydrofera Blue: (cut to size and moistened with normal saline)  [] Hydrofera Blue Ready (cut to size)      [x] Normal Saline wet to dry if vac malfunctions [] Betadine wet to dry    [] Hydrogel  [] Mepitel     [] Bactroban/Mupirocin   [] Iodoform Packing Strip [] Plain Packing Strip   [] Skin Sub:   [] Other:      [] Cover and Secure with:     [] Gauze [] Loyda [] Kerlix   [] Foam border [] Super

## 2024-05-13 NOTE — WOUND CARE
Absorbant [] ABD     [] Ace Wrap [] Other:    Limit contact of tape with skin.    [] Change dressing: [] Daily    [] Every Other Day   [] Twice per week   [] Three times per week   [] Once a week [] Do Not Change Dressing   [] Other:     Negative Pressure:           Wound Location: Back   [x] Pressure @ 125 mm/Hg  [x]Continuous []Intermittent   [x] Black Foam  [x] White Foam or Hydrofera blue to undermining [x] Bridge for comfort if needed  [x] Change dressing 3 times per week     [x] Other: skin prep to janusz-wound       Dietary:  [x] Diet as tolerated: [] Calorie Diabetic Diet: [] No Added Salt:  [x] Increase Protein: [] Other:     Activity:  [x] Activity as tolerated:    [] Patient has no activity restrictions      [] Strict Bedrest   [] Remain off Work      [] May return to full duty work                                     [] Return to work with restrictions     Physician:  [] Dr. Giselle Bridges  [] Dr. Du Calderon  [x] Dr. Spencer Lopez      Nurse Case Manger: Krista      Wound Care Center Information: Should you experience any significant changes in your wound(s) or have questions about your wound care, please contact the Wound Center at [phone]. Our hours are Monday - Friday 8am - 4:30pm, closed all major holidays. If you need help with your wound outside these hours and cannot wait until we are again available, contact your PCP or go to the hospital emergency room.     PLEASE NOTE: IF YOU ARE UNABLE TO OBTAIN WOUND SUPPLIES, CONTINUE TO USE THE SUPPLIES YOU HAVE AVAILABLE UNTIL YOU ARE ABLE TO REACH US. IT IS MOST IMPORTANT TO KEEP THE WOUND COVERED AT ALL TIMES.

## 2024-05-13 NOTE — PROGRESS NOTES
[facility]   Wound Care and Hyperbaric Oxygen Therapy Center   Medical Staff Note     Paula Renteria  MEDICAL RECORD NUMBER:  [id number]  AGE: 53 y.o.   GENDER: female  : 1970  EPISODE DATE:  2024      Chief Complaint:   Chief Complaint   Patient presents with    Wound Check     Lower back         History of Present Illness:  Paula Renteria is a 53 y.o. female who presents today for wound/ulcer evaluation of lower back following lower back surgery by Dr. Pulido.     History of Wound Context: Per original history and physical on this patient. Changes in history since last evaluation: Previous cultures in March were positive for Pseudomonas resistant to multiple antibiotics. She is currently on IV antibiotics. She has been followed by Dr. Miramontes from infectious disease. Patient reports feeling better today with less fatigue than one year ago.    She currently has to wound VAC 1 for the left hip and the other 1 for the sacral wound area.     No new issues.  She is currently still on IV antibiotics.     Wound: Lower back nonhealing surgical open wound.        Past Medical History:       Diagnosis Date    Thyroid disease        Past Surgical History:   Past Surgical History:   Procedure Laterality Date    BACK SURGERY      CHOLECYSTECTOMY      COLON SURGERY      HIP SURGERY Left 3/4/2024    DEBRIDEMENT LEFT HIP, GIRDLESTONE RESECTION LEFT HIP, ANTIBIOTIC CEMENT SPACER PLACEMENT LEFT HIP, ADDUCTOR TENOTOMY LEFT HIP performed by Haris Ramirez MD at [facility] MAIN OR    JOINT REPLACEMENT      LEG SURGERY Left 3/8/2024    EXCISIONAL DEBRIDEMENT OF LEFT HIP WOUND performed by Haris Ramirez MD at [facility] MAIN OR    REVISION TOTAL KNEE ARTHROPLASTY Right        Allergy:  Allergies   Allergen Reactions    Latex Hives and Other (See Comments)     blisters    Colloidal Oatmeal Anaphylaxis    Penicillins Rash, Hives and Swelling     blisters    Gabapentin Other (See Comments)     Lost control of

## 2024-05-21 ENCOUNTER — TELEMEDICINE (OUTPATIENT)
Age: 54
End: 2024-05-21
Payer: MEDICARE

## 2024-05-21 DIAGNOSIS — B37.2 CUTANEOUS CANDIDIASIS: ICD-10-CM

## 2024-05-21 DIAGNOSIS — M86.9 OSTEOMYELITIS OF LEFT HIP (HCC): Primary | ICD-10-CM

## 2024-05-21 DIAGNOSIS — M86.68 CHRONIC OSTEOMYELITIS OF LUMBAR SPINE (HCC): ICD-10-CM

## 2024-05-21 PROCEDURE — 99214 OFFICE O/P EST MOD 30 MIN: CPT | Performed by: INTERNAL MEDICINE

## 2024-05-21 RX ORDER — FLUCONAZOLE 100 MG/1
100 TABLET ORAL DAILY
Qty: 7 TABLET | Refills: 0 | Status: SHIPPED | OUTPATIENT
Start: 2024-05-21 | End: 2024-05-28

## 2024-05-21 RX ORDER — CLOTRIMAZOLE 1 %
CREAM (GRAM) TOPICAL
Qty: 30 G | Refills: 1 | Status: SHIPPED | OUTPATIENT
Start: 2024-05-21 | End: 2024-05-28

## 2024-05-21 NOTE — PROGRESS NOTES
HPI: 53 y.o WM seen virtually for routine f/u left hip osteomyelitis/septic arthritis. 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 She recently underwent debridement of her left hip w placement of antibiotic spacer by Dr. Ramirez, completed 6 weeks of empiric Vanc and cefepime.  C albicans was isolated from Rare C albicans was isolated from wound Cx on 4/26/24. Staph aureus, beta strep or other organisms were not detected. She completed a 2-wek course of micafungin postoperatively. She reported cutaneous candidiasis during her last encounter with me a couple wks ago, a 2-wk course of fluconazole was ordered which she completed.  She reported symptomatic improvement today however notes a persistent erythematous rash and w associate pruritus in her groin and breast areas. CT of her spine was recently done by [facility] on 05/16 she is awaiting results and plan. She denies hip  pain and mentioned her incisional wound is healing w/o complications. She reported doing well during today's encounter, wants PICC line maintained until she hears from [facility].     ROS: Erythematous rash involving groin and breasts w associated pruritus       Past Medical History:   Diagnosis Date    Thyroid disease         Past Surgical History:   Procedure Laterality Date    BACK SURGERY      CHOLECYSTECTOMY      COLON SURGERY      HIP SURGERY Left 3/4/2024    DEBRIDEMENT LEFT HIP, GIRDLESTONE RESECTION LEFT HIP, ANTIBIOTIC CEMENT SPACER PLACEMENT LEFT HIP, ADDUCTOR TENOTOMY LEFT HIP performed by Haris Ramirez MD at [facility] MAIN OR    JOINT REPLACEMENT      LEG SURGERY Left 3/8/2024    EXCISIONAL DEBRIDEMENT OF LEFT HIP WOUND performed by Haris Ramirez MD at [facility] MAIN OR    REVISION TOTAL KNEE ARTHROPLASTY Right         Social History     Tobacco Use    Smoking status: Every Day     Current packs/day: 0.50     Average packs/day: 0.5 packs/day for 40.0 years (20.0 ttl pk-yrs)

## 2024-06-03 ENCOUNTER — HOSPITAL ENCOUNTER (OUTPATIENT)
Facility: HOSPITAL | Age: 54
Discharge: HOME | End: 2024-06-03
Attending: SPECIALIST
Payer: MEDICARE

## 2024-06-03 VITALS
HEART RATE: 88 BPM | DIASTOLIC BLOOD PRESSURE: 65 MMHG | RESPIRATION RATE: 18 BRPM | SYSTOLIC BLOOD PRESSURE: 120 MMHG | TEMPERATURE: 97.9 F

## 2024-06-03 DIAGNOSIS — T81.89XA NONHEALING SURGICAL WOUND, INITIAL ENCOUNTER: ICD-10-CM

## 2024-06-03 DIAGNOSIS — S31.000A OPEN WOUND OF LOWER BACK: ICD-10-CM

## 2024-06-03 DIAGNOSIS — L98.423 NON-PRESSURE CHRONIC ULCER OF BACK WITH NECROSIS OF MUSCLE (HCC): Primary | ICD-10-CM

## 2024-06-03 PROCEDURE — 97605 NEG PRS WND THER DME<=50SQCM: CPT

## 2024-06-03 RX ORDER — GENTAMICIN SULFATE 1 MG/G
OINTMENT TOPICAL ONCE
OUTPATIENT
Start: 2024-06-03 | End: 2024-06-03

## 2024-06-03 RX ORDER — BETAMETHASONE DIPROPIONATE 0.5 MG/G
CREAM TOPICAL ONCE
OUTPATIENT
Start: 2024-06-03 | End: 2024-06-03

## 2024-06-03 RX ORDER — GINSENG 100 MG
CAPSULE ORAL ONCE
OUTPATIENT
Start: 2024-06-03 | End: 2024-06-03

## 2024-06-03 RX ORDER — CLOBETASOL PROPIONATE 0.5 MG/G
OINTMENT TOPICAL ONCE
OUTPATIENT
Start: 2024-06-03 | End: 2024-06-03

## 2024-06-03 RX ORDER — TRIAMCINOLONE ACETONIDE 1 MG/G
OINTMENT TOPICAL ONCE
OUTPATIENT
Start: 2024-06-03 | End: 2024-06-03

## 2024-06-03 RX ORDER — IBUPROFEN 200 MG
TABLET ORAL ONCE
OUTPATIENT
Start: 2024-06-03 | End: 2024-06-03

## 2024-06-03 RX ORDER — BACITRACIN ZINC AND POLYMYXIN B SULFATE 500; 1000 [USP'U]/G; [USP'U]/G
OINTMENT TOPICAL ONCE
OUTPATIENT
Start: 2024-06-03 | End: 2024-06-03

## 2024-06-03 RX ORDER — SODIUM CHLOR/HYPOCHLOROUS ACID 0.033 %
SOLUTION, IRRIGATION IRRIGATION ONCE
OUTPATIENT
Start: 2024-06-03 | End: 2024-06-03

## 2024-06-03 ASSESSMENT — PAIN DESCRIPTION - LOCATION: LOCATION: BACK

## 2024-06-03 ASSESSMENT — PAIN DESCRIPTION - ORIENTATION: ORIENTATION: LOWER

## 2024-06-03 ASSESSMENT — PAIN DESCRIPTION - DESCRIPTORS: DESCRIPTORS: ACHING

## 2024-06-03 ASSESSMENT — PAIN SCALES - GENERAL: PAINLEVEL_OUTOF10: 3

## 2024-06-03 NOTE — PROGRESS NOTES
[facility]   [facility] Care [facility]   Medical Staff Note     Paula Renteria  MEDICAL RECORD NUMBER:  [id number]  AGE: 53 y.o.   GENDER: female  : 1970  EPISODE DATE:  6/3/2024      Chief Complaint:   Chief Complaint   Patient presents with    Wound Check     Back wound      History of Present Illness:  Paula Renteria is a 53 y.o. female who presents today for wound/ulcer evaluation of lower back following lower back surgery by Dr. Pulido.     History of Wound Context: Per original history and physical on this patient. Changes in history since last evaluation: Previous cultures in March were positive for Pseudomonas resistant to multiple antibiotics. She is currently on IV antibiotics. She has been followed by Dr. Miramontes from infectious disease. Patient reports feeling better today with less fatigue than one year ago.     She currently has to wound VAC 1 for the left hip and the other 1 for the sacral wound area.     No new issues.  She is currently still on IV antibiotics.     Wound: Lower back nonhealing surgical open wound.    Past Medical History:       Diagnosis Date    Thyroid disease        Past Surgical History:   Past Surgical History:   Procedure Laterality Date    BACK SURGERY      CHOLECYSTECTOMY      COLON SURGERY      HIP SURGERY Left 3/4/2024    DEBRIDEMENT LEFT HIP, GIRDLESTONE RESECTION LEFT HIP, ANTIBIOTIC CEMENT SPACER PLACEMENT LEFT HIP, ADDUCTOR TENOTOMY LEFT HIP performed by Haris Ramirez MD at [facility] MAIN OR    JOINT REPLACEMENT      LEG SURGERY Left 3/8/2024    EXCISIONAL DEBRIDEMENT OF LEFT HIP WOUND performed by Haris Ramirez MD at [facility] MAIN OR    REVISION TOTAL KNEE ARTHROPLASTY Right        Allergy:  Allergies   Allergen Reactions    Latex Hives and Other (See Comments)     blisters    Colloidal Oatmeal Anaphylaxis    Penicillins Rash, Hives and Swelling     blisters    Gabapentin Other (See Comments)     Lost control of self. 
Return Appointment: 4 Week(s)  [] Nurse Visit:     [] Discharge from [facility]: [] Healed        [] Consult    Follow-up Information:  [] Ordered Tests:   [x] Referrals: Ortho for hip, ID   [] Other:     Wound Cleansing:   Do not scrub or use excessive force.  Cleanse wound prior to applying a clean dressing with:  [x] Normal Saline   [] Keep Wound Dry in Shower     [] Wound Cleanser   [] Cleanse wound with Mild Soap & Water    [] Other:       Topical Treatments:  Do not apply lotions, creams, or ointments to wound bed unless directed.   [] Apply moisturizing lotion to skin surrounding the wound prior to dressing change.  [] Apply antifungal ointment to skin surrounding the wound prior to dressing change.  [] Apply thin film of moisture barrier ointment to skin immediately around wound.  [] Apply Betadine to skin immediately around wound   [] Other:      Dressings:           Wound Location Back    [x] Apply Primary Dressing:       [] MediHoney Gel [] MediHoney Alginate  [] Calcium Alginate with Silver   [] Calcium Alginate without silver   [] Collagen with silver [] Collagen without Silver    [] Santyl with moist saline gauze     [] Hydrofera Blue: (cut to size and moistened with normal saline)  [] Hydrofera Blue Ready (cut to size)      [x] Normal Saline wet to dry if vac malfunctions [] Betadine wet to dry    [] Hydrogel  [] Mepitel     [] Bactroban/Mupirocin   [] Iodoform Packing Strip [] Plain Packing Strip   [] Skin Sub:   [] Other:      [] Cover and Secure with:     [] Gauze [] Loyda [] Kerlix   [] Foam border [] Super Absorbant [] ABD     [] Ace Wrap [] Other:    Limit contact of tape with skin.    [] Change dressing: [] Daily    [] Every Other Day   [] Twice per week   [] Three times per week   [] Once a week [] Do Not Change Dressing   [] Other:     Negative Pressure:           Wound Location: Back   [x] Pressure @ 125 mm/Hg  [x]Continuous []Intermittent   [x] Black Foam  [x] White Foam or Hydrofera blue to

## 2024-06-03 NOTE — WOUND CARE
Negative Pressure Wound Therapy    NAME:  Paula Renteria  YOB: 1970  MEDICAL RECORD NUMBER:  [id number]  DATE:  6/3/2024    Applied Negative Pressure to back wound(s)/ulcer(s).  [x] Applied skin barrier prep to janusz-wound.   [x] Cut strips of plastic drape to picture frame wound so that janusz-wound is     covered with the drape.   [x] If bridging dressing to less prominent site, cover any intact skin that will come in contact with the Negative Pressure Therapy sponge, gauze or channel drain with plastic drape. The sponge should never touch intact skin.   [x] Cut sponge, gauze or channel drain to size which will fit into the wound/ulcer bed without being forced.   [x] Be sure the sponge is large enough to hold the entire round plastic flange which is attached to the tubing. Never allow flange to be larger than the sponge or it will produce suction damaging intact skin.  Total number of individual pieces of foam used within the wound bed: 2    [x] If bridging the dressing away from the primary site, be sure the bridge leads to a piece of sponge large enough to hold the entire flange without allowing any of the flange to overlap onto intact skin.   [x] Covered sponge, gauze or channel drain with plastic drape.   [x] Cut a hole in this plastic drape directly over the sponge the same size as the plastic drain tubing.   [x] Removed plastic liner from flange and apply it directly over the hole you cut.   [x] Removed the plastic cover from the flange.   [x] Attached the tubing to the wound/ulcer Negative Pressure Therapy and turn it on to be sure a vacuum is created and that there are no leaks.   [x] If air leaks occur, use plastic drape to patch them.   [x] Secured Negative Pressure Therapy dressing with ace wrap loosely if located on an extremity. Maintain tubing outside of ace wrap. Tubing must not exert pressure on intact skin.    Applied per  Guidelines      Electronically signed by Yakelin 
Absorbant [] ABD     [] Ace Wrap [] Other:    Limit contact of tape with skin.    [] Change dressing: [] Daily    [] Every Other Day   [] Twice per week   [] Three times per week   [] Once a week [] Do Not Change Dressing   [] Other:     Negative Pressure:           Wound Location: Back   [x] Pressure @ 125 mm/Hg  [x]Continuous []Intermittent   [x] Black Foam  [x] White Foam or Hydrofera blue to undermining [x] Bridge for comfort if needed  [x] Change dressing 3 times per week     [x] Other: skin prep to janusz-wound       Dietary:  [x] Diet as tolerated: [] Calorie Diabetic Diet: [] No Added Salt:  [x] Increase Protein: [] Other:     Activity:  [x] Activity as tolerated:    [] Patient has no activity restrictions      [] Strict Bedrest   [] Remain off Work      [] May return to full duty work                                     [] Return to work with restrictions     Physician:  [] Dr. Giselle Bridges  [] Dr. Du Calderon  [x] Dr. Spencer Lopez      Nurse Case Manger: Krista      Wound Care Center Information: Should you experience any significant changes in your wound(s) or have questions about your wound care, please contact the Wound Center at [phone]. Our hours are Monday - Friday 8am - 4:30pm, closed all major holidays. If you need help with your wound outside these hours and cannot wait until we are again available, contact your PCP or go to the hospital emergency room.     PLEASE NOTE: IF YOU ARE UNABLE TO OBTAIN WOUND SUPPLIES, CONTINUE TO USE THE SUPPLIES YOU HAVE AVAILABLE UNTIL YOU ARE ABLE TO REACH US. IT IS MOST IMPORTANT TO KEEP THE WOUND COVERED AT ALL TIMES.

## 2024-06-03 NOTE — DISCHARGE INSTRUCTIONS
Wound Clinic Physician Orders and Discharge Instructions  [facility]  [address], [address]  [place] [address]  Telephone: [phone]     FAX [phone]    NAME:  Paula Renteria  YOB: 1970  MEDICAL RECORD NUMBER:  [id number]  DATE:  6/3/2024      Return Appointment:  [] Dressing Supply Provider:   [x] Home Healthcare: [facility]   [x] Return Appointment: 4 Week(s)  [] Nurse Visit:     [] Discharge from [facility]: [] Healed        [] Consult    Follow-up Information:  [] Ordered Tests:   [x] Referrals: Ortho for hip, ID   [] Other:     Wound Cleansing:   Do not scrub or use excessive force.  Cleanse wound prior to applying a clean dressing with:  [x] Normal Saline   [] Keep Wound Dry in Shower     [] Wound Cleanser   [] Cleanse wound with Mild Soap & Water    [] Other:       Topical Treatments:  Do not apply lotions, creams, or ointments to wound bed unless directed.   [] Apply moisturizing lotion to skin surrounding the wound prior to dressing change.  [] Apply antifungal ointment to skin surrounding the wound prior to dressing change.  [] Apply thin film of moisture barrier ointment to skin immediately around wound.  [] Apply Betadine to skin immediately around wound   [] Other:      Dressings:           Wound Location Back    [x] Apply Primary Dressing:       [] MediHoney Gel [] MediHoney Alginate  [] Calcium Alginate with Silver   [] Calcium Alginate without silver   [] Collagen with silver [] Collagen without Silver    [] Santyl with moist saline gauze     [] Hydrofera Blue: (cut to size and moistened with normal saline)  [] Hydrofera Blue Ready (cut to size)      [x] Normal Saline wet to dry if vac malfunctions [] Betadine wet to dry    [] Hydrogel  [] Mepitel     [] Bactroban/Mupirocin   [] Iodoform Packing Strip [] Plain Packing Strip   [] Skin Sub:   [] Other:      [] Cover and Secure with:     [] Gauze [] Loyda [] Kerlix   [] Foam border [] Super

## 2024-09-21 NOTE — ANESTHESIA POSTPROCEDURE EVALUATION
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    Renetta Phase I: Renetta Score: 9    Renetta Phase II:      Anesthesia Post Evaluation    Patient location during evaluation: PACU  Patient participation: complete - patient cannot participate  Level of consciousness: awake  Pain score: 0  Airway patency: patent  Nausea & Vomiting: no nausea and no vomiting  Cardiovascular status: hemodynamically stable  Respiratory status: acceptable  Hydration status: stable  Multimodal analgesia pain management approach        No notable events documented.

## 2024-10-16 RX ORDER — FAMOTIDINE 20 MG/1
TABLET, FILM COATED ORAL
Qty: 60 TABLET | Refills: 3 | OUTPATIENT
Start: 2024-10-16

## (undated) DEVICE — BANDAGE WND 3INX5YD CO FLX FOAM

## (undated) DEVICE — SUTURE VCRL SZ 2-0 L27IN ABSRB VLT L36MM CT-1 1/2 CIR J339H

## (undated) DEVICE — SOLUTION IV 1000ML 5% D 0.9% SOD CHL PH 4 INJ USP VIAFLX

## (undated) DEVICE — GAUZE,SPONGE,4"X4",16PLY,STRL,LF,10/TRAY: Brand: MEDLINE

## (undated) DEVICE — SHEET, DRAPE, SPLIT, STERILE: Brand: MEDLINE

## (undated) DEVICE — STRYKER PERFORMANCE SERIES SAGITTAL BLADE: Brand: STRYKER PERFORMANCE SERIES

## (undated) DEVICE — HIGH CAPACITY 12" INTRAMEDULLARY TIP: Brand: PULSAVAC®

## (undated) DEVICE — HANDPIECE SET WITH HIGH FLOW TIP AND SUCTION TUBE: Brand: INTERPULSE

## (undated) DEVICE — SUTURE ABSORBABLE MONOFILAMENT 1-0 CT1 36 IN VIO PDS + PDP347H

## (undated) DEVICE — COVER,TABLE,HEAVY DUTY,79"X110",STRL: Brand: MEDLINE

## (undated) DEVICE — SUTURE ETHLN SZ 2 L30IN NONABSORBABLE BLK L75MM LR 3/8 CIR 490T

## (undated) DEVICE — BASIC SINGLE BASIN-LF: Brand: MEDLINE INDUSTRIES, INC.

## (undated) DEVICE — DRAPE,REIN 53X77,STERILE: Brand: MEDLINE

## (undated) DEVICE — TUBING, SUCTION, 1/4" X 12', STRAIGHT: Brand: MEDLINE

## (undated) DEVICE — SUTURE VCRL + SZ 2-0 L27IN ABSRB UD CP-1 1/2 CIR REV CUT VCP266H

## (undated) DEVICE — SOUTHSIDE TURNOVER: Brand: MEDLINE INDUSTRIES, INC.

## (undated) DEVICE — YANKAUER,BULB TIP,W/O VENT,RIGID,STERILE: Brand: MEDLINE

## (undated) DEVICE — 3M™ IOBAN™ 2 ANTIMICROBIAL INCISE DRAPE 6650EZ: Brand: IOBAN™ 2

## (undated) DEVICE — SPONGE GZ 4X4 IN 16-PLY DETECTABLE W/ DMT MSTR TAG

## (undated) DEVICE — PADDING CAST W4INXL4YD SYN NAT PROTOUCH

## (undated) DEVICE — NEEDLE SPNL 22GA BLK HUB S STL REG WALL FIT STYL W/ QNCKE

## (undated) DEVICE — MINOR EXTREMITY PACK: Brand: MEDLINE INDUSTRIES, INC.

## (undated) DEVICE — FILTER IV 5UM L1.75IN FLX STRW FOR FLD ASPIR FR GLS AMP

## (undated) DEVICE — GLOVE ORTHO 8   MSG9480

## (undated) DEVICE — KIT EVAC 400CC DIA3/16IN H PAT 12.5IN 3 SPR RND SHP PVC DRN

## (undated) DEVICE — FAN SPRAY KIT: Brand: PULSAVAC®

## (undated) DEVICE — SUTURE VCRL + SZ 1 L27IN ABSRB UD OS 6 L36MM 1 2 CIR REV CUT VCP535H

## (undated) DEVICE — CANISTER NEG PRSS 500ML WND THER W/ TBNG NO PRSS RANG W/

## (undated) DEVICE — APPLICATOR MEDICATED 26 CC SOLUTION SCRB TEAL CHLORAPREP

## (undated) DEVICE — INTENDED FOR TISSUE SEPARATION, AND OTHER PROCEDURES THAT REQUIRE A SHARP SURGICAL BLADE TO PUNCTURE OR CUT.: Brand: BARD-PARKER ®  SAFETY SCALPED

## (undated) DEVICE — KIT BNE CEM PREP FEM QUIK-USE 10 PER PK

## (undated) DEVICE — DRAPE EQUIP C ARM 74X42 IN MOB XR W/ TIE RUBBER BND LF

## (undated) DEVICE — DRAPE,ORTHOMAX ,HIP,W/POUCHES: Brand: MEDLINE

## (undated) DEVICE — DRAPE,SPLIT ,77X120: Brand: MEDLINE

## (undated) DEVICE — SOLUTION IRRIG 500ML 0.9% SOD CHLO USP POUR PLAS BTL

## (undated) DEVICE — DRAPE,HAND,STERILE: Brand: MEDLINE

## (undated) DEVICE — GLOVE ORANGE PI 7   MSG9070

## (undated) DEVICE — MAT ANTISLIP W16INXL10YD BLU BULK RL DYCEM

## (undated) DEVICE — 3M™ IOBAN™ 2 ANTIMICROBIAL INCISE DRAPE 6648EZ: Brand: IOBAN™ 2

## (undated) DEVICE — ELECTRODE PT RET AD L9FT HI MOIST COND ADH HYDRGEL CORDED

## (undated) DEVICE — WET SKIN PREP TRAY: Brand: MEDLINE INDUSTRIES, INC.

## (undated) DEVICE — SOLUTION IRRIG 3000ML 0.9% SOD CHL USP UROMATIC PLAS CONT

## (undated) DEVICE — GLOVE SURG SZ 8 L12IN FNGR THK79MIL GRN LTX FREE

## (undated) DEVICE — LOOP VES L406MM DIA1MM MAXI BLU SIL RADPQ DISP

## (undated) DEVICE — PREVENA INCISION MANAGEMENT SYSTEM- PEEL & PLACE DRESSING: Brand: PREVENA™ PEEL & PLACE™

## (undated) DEVICE — 3M™ STERI-DRAPE™ INCISE DRAPE 1050 (60CM X 45CM): Brand: STERI-DRAPE™

## (undated) DEVICE — SUTURE VCRL SZ 0 L36IN ABSRB VLT L36MM CT-1 1/2 CIR J346H

## (undated) DEVICE — SUTURE ETHLN SZ 3-0 L18IN NONABSORBABLE BLK FS-1 L24MM 3/8 663H

## (undated) DEVICE — STAPLER SKIN H3.9MM WIRE DIA0.58MM CRWN 6.9MM 35 STPL ROT

## (undated) DEVICE — GARMENT,MEDLINE,DVT,INT,CALF,MED, GEN2: Brand: MEDLINE

## (undated) DEVICE — BIPOLAR SEALER 23-301-1 AQM MBS: Brand: AQUAMANTYS™

## (undated) DEVICE — SYRINGE, LUER LOCK, 10ML: Brand: MEDLINE

## (undated) DEVICE — PICO 7 10CM X 40CM: Brand: PICO™ 7

## (undated) DEVICE — SPONGE DRN W4XL4IN RAYON/POLYESTER 6 PLY NONWOVEN PRECUT 2 PER PK

## (undated) DEVICE — HOOD: Brand: FLYTE, SURGICOOL

## (undated) DEVICE — TOWEL SURG W17XL27IN STD BLU COT NONFENESTRATED PREWASHED

## (undated) DEVICE — MAJOR ORTHO PROCEDURE PACK: Brand: MEDLINE INDUSTRIES, INC.

## (undated) DEVICE — 3M™ STERI-DRAPE™ U-DRAPE 1015: Brand: STERI-DRAPE™